# Patient Record
Sex: FEMALE | Race: WHITE | NOT HISPANIC OR LATINO | Employment: OTHER | URBAN - METROPOLITAN AREA
[De-identification: names, ages, dates, MRNs, and addresses within clinical notes are randomized per-mention and may not be internally consistent; named-entity substitution may affect disease eponyms.]

---

## 2017-08-05 ENCOUNTER — HOSPITAL ENCOUNTER (EMERGENCY)
Facility: HOSPITAL | Age: 57
Discharge: HOME | End: 2017-08-06
Attending: EMERGENCY MEDICINE | Admitting: EMERGENCY MEDICINE

## 2017-08-05 VITALS
HEART RATE: 90 BPM | HEIGHT: 64 IN | BODY MASS INDEX: 37.56 KG/M2 | WEIGHT: 220 LBS | SYSTOLIC BLOOD PRESSURE: 128 MMHG | RESPIRATION RATE: 18 BRPM | DIASTOLIC BLOOD PRESSURE: 73 MMHG | OXYGEN SATURATION: 96 % | TEMPERATURE: 97.3 F
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
## 2017-08-05 DIAGNOSIS — M25.561 RIGHT KNEE PAIN: Primary | ICD-10-CM

## 2017-08-05 LAB — ETHANOL EXG-MCNC: 0.12 MG/DL

## 2017-08-05 PROCEDURE — 82075 ASSAY OF BREATH ETHANOL: CPT | Performed by: EMERGENCY MEDICINE

## 2017-08-06 PROCEDURE — 99284 EMERGENCY DEPT VISIT MOD MDM: CPT

## 2018-06-19 LAB
ABSOL LYMPHOCYTES (HISTORICAL): 1.9 K/UL (ref 0.5–4)
BASOPHILS # BLD AUTO: 0.1 K/UL (ref 0–0.1)
BASOPHILS # BLD AUTO: 1 % (ref 0–1)
DEPRECATED RDW RBC AUTO: 15.8 %
EOSINOPHIL # BLD AUTO: 0.2 K/UL (ref 0–0.4)
EOSINOPHIL NFR BLD AUTO: 3 % (ref 0–6)
FOLATE SERPL-MCNC: >20 NG/ML
HCT VFR BLD AUTO: 31.2 % (ref 36–46)
HGB BLD-MCNC: 10 G/DL (ref 12–16)
IRON SERPL-MCNC: 36 UG/DL (ref 37–170)
LYMPHOCYTES NFR BLD AUTO: 25 % (ref 25–45)
MCH RBC QN AUTO: 25.5 PG (ref 26–34)
MCHC RBC AUTO-ENTMCNC: 32 % (ref 31–36)
MCV RBC AUTO: 80 FL (ref 80–100)
MONOCYTES # BLD AUTO: 0.7 K/UL (ref 0.2–0.9)
MONOCYTES NFR BLD AUTO: 10 % (ref 1–10)
NEUTROPHILS ABS COUNT (HISTORICAL): 4.8 K/UL (ref 1.8–7.8)
NEUTS SEG NFR BLD AUTO: 61 % (ref 45–65)
PERCENT SATURATION (HISTORICAL): 8 % (ref 11–46)
PLATELET # BLD AUTO: 342 K/MCL (ref 150–450)
RBC # BLD AUTO: 3.92 M/MCL (ref 4–5.2)
TIBC SERPL-MCNC: 473 UG/DL (ref 265–497)
VIT B12 SERPL-MCNC: 566 PG/ML (ref 239–931)
WBC # BLD AUTO: 7.7 K/MCL (ref 4.5–11)

## 2018-07-12 ENCOUNTER — TELEPHONE (OUTPATIENT)
Dept: FAMILY MEDICINE CLINIC | Facility: CLINIC | Age: 58
End: 2018-07-12

## 2018-07-12 ENCOUNTER — CLINICAL SUPPORT (OUTPATIENT)
Dept: FAMILY MEDICINE CLINIC | Facility: CLINIC | Age: 58
End: 2018-07-12
Payer: MEDICARE

## 2018-07-12 DIAGNOSIS — E53.8 VITAMIN B12 DEFICIENCY: Primary | ICD-10-CM

## 2018-07-12 RX ORDER — CYANOCOBALAMIN 1000 UG/ML
1000 INJECTION INTRAMUSCULAR; SUBCUTANEOUS
Status: SHIPPED | OUTPATIENT
Start: 2018-07-12 | End: 2019-07-07

## 2018-07-12 RX ORDER — LEVOCETIRIZINE DIHYDROCHLORIDE 5 MG/1
TABLET, FILM COATED ORAL
COMMUNITY
Start: 2017-11-16 | End: 2018-09-20 | Stop reason: SDUPTHER

## 2018-07-12 RX ADMIN — CYANOCOBALAMIN 1000 MCG: 1000 INJECTION INTRAMUSCULAR; SUBCUTANEOUS at 14:06

## 2018-09-20 ENCOUNTER — OFFICE VISIT (OUTPATIENT)
Dept: FAMILY MEDICINE CLINIC | Facility: CLINIC | Age: 58
End: 2018-09-20
Payer: MEDICARE

## 2018-09-20 VITALS
HEIGHT: 64 IN | OXYGEN SATURATION: 96 % | WEIGHT: 220 LBS | SYSTOLIC BLOOD PRESSURE: 130 MMHG | TEMPERATURE: 97.1 F | DIASTOLIC BLOOD PRESSURE: 88 MMHG | HEART RATE: 84 BPM | BODY MASS INDEX: 37.56 KG/M2 | RESPIRATION RATE: 16 BRPM

## 2018-09-20 DIAGNOSIS — Z23 NEED FOR INFLUENZA VACCINATION: ICD-10-CM

## 2018-09-20 DIAGNOSIS — D50.8 OTHER IRON DEFICIENCY ANEMIA: Primary | ICD-10-CM

## 2018-09-20 DIAGNOSIS — B35.4 TINEA CORPORIS: ICD-10-CM

## 2018-09-20 DIAGNOSIS — Z88.9 H/O SEASONAL ALLERGIES: ICD-10-CM

## 2018-09-20 DIAGNOSIS — K21.9 GERD WITHOUT ESOPHAGITIS: ICD-10-CM

## 2018-09-20 PROCEDURE — 99214 OFFICE O/P EST MOD 30 MIN: CPT

## 2018-09-20 PROCEDURE — 90686 IIV4 VACC NO PRSV 0.5 ML IM: CPT

## 2018-09-20 PROCEDURE — G0008 ADMIN INFLUENZA VIRUS VAC: HCPCS

## 2018-09-20 PROCEDURE — 96372 THER/PROPH/DIAG INJ SC/IM: CPT

## 2018-09-20 RX ORDER — LEVOCETIRIZINE DIHYDROCHLORIDE 5 MG/1
5 TABLET, FILM COATED ORAL EVERY EVENING
Qty: 30 TABLET | Refills: 3 | Status: SHIPPED | OUTPATIENT
Start: 2018-09-20 | End: 2018-12-28 | Stop reason: SDUPTHER

## 2018-09-20 RX ORDER — NYSTATIN 100000 [USP'U]/G
POWDER TOPICAL 2 TIMES DAILY
Qty: 45 G | Refills: 1 | Status: SHIPPED | OUTPATIENT
Start: 2018-09-20 | End: 2018-12-06 | Stop reason: SDUPTHER

## 2018-09-20 RX ORDER — OMEPRAZOLE 20 MG/1
20 TABLET, DELAYED RELEASE ORAL DAILY
Qty: 30 TABLET | Refills: 3 | Status: SHIPPED | OUTPATIENT
Start: 2018-09-20 | End: 2018-10-02 | Stop reason: SDUPTHER

## 2018-09-20 RX ORDER — CYANOCOBALAMIN 1000 UG/ML
1000 INJECTION INTRAMUSCULAR; SUBCUTANEOUS
Status: DISCONTINUED | OUTPATIENT
Start: 2018-09-20 | End: 2021-04-15

## 2018-09-20 RX ADMIN — CYANOCOBALAMIN 1000 MCG: 1000 INJECTION INTRAMUSCULAR; SUBCUTANEOUS at 15:27

## 2018-09-20 NOTE — PROGRESS NOTES
Assessment/Plan:    No problem-specific Assessment & Plan notes found for this encounter  Diagnoses and all orders for this visit:    Other iron deficiency anemia  Comments:  She stopped taking Fe tabs due to interaction with other meds  Ordered CBC with diff, and TIBC  Orders:  -     CBC and differential; Future  -     TIBC; Future    Tinea corporis  Comments:  Rash noted below the right breast and under the panus  Ordered nystatin powder  Orders:  -     nystatin (MYCOSTATIN) powder; Apply topically 2 (two) times a day    H/O seasonal allergies  Comments:  Refilled Xyzal  Orders:  -     levocetirizine (XYZAL) 5 MG tablet; Take 1 tablet (5 mg total) by mouth every evening    GERD without esophagitis  Comments:  Controlled, Refilled omeprazole  Orders:  -     omeprazole (PRILOSEC OTC) 20 MG tablet; Take 1 tablet (20 mg total) by mouth daily          Subjective:      Patient ID: Steve Etienne is a 61 y o  female  HPI  Nacho Rivera is a 62 yo female coming in for a check up presenting with an intertrigous rash x 1 month  She says that the rash is itchy and feels prickly  It is below her breasts and panus  She reports applying her deodorant on it which made the rash under her left breast disappear  The rash under the panus has not improved  She also reports that she has stopped taking the iron pills because when she took it with some of her psych meds, she felt very loopy and sluggish  Her speech became slurred  The following portions of the patient's history were reviewed and updated as appropriate:   She  has a past medical history of Bipolar affective disorder, depressed ([address] ); Heart murmur; Osteoarthritis; Psychiatric disorder; and Vitamin B12 deficiency  She There are no active problems to display for this patient      Current Outpatient Prescriptions   Medication Sig Dispense Refill    acetaminophen (TYLENOL) 650 mg CR tablet 1 as directed for pain      Azelastine-Fluticasone 137-50 MCG/ACT SUSP Every 12 hours      Buprenorphine (BUTRANS) 5 MCG/HR PTWK apply 1 patch by transdermal route  every 7 days      buPROPion (WELLBUTRIN XL) 300 mg 24 hr tablet every 24 hours      DULoxetine (CYMBALTA) 60 mg delayed release capsule every 12 (twelve) hours      DULoxetine HCl (CYMBALTA PO) Take by mouth      fluocinolone acetonide (DERMOTIC) 0 01 % otic oil apply by topical route thin layer to damp scalp massage well and cover  Leave on for 4 hours or overnight then wash off      GABAPENTIN PO Take by mouth      ibuprofen (MOTRIN) 200 mg tablet Reported on 7/10/2017       levocetirizine (XYZAL) 5 MG tablet Take 1 tablet (5 mg total) by mouth every evening 30 tablet 3    omeprazole (PRILOSEC OTC) 20 MG tablet Take 1 tablet (20 mg total) by mouth daily 30 tablet 3    QUEtiapine Fumarate (SEROQUEL PO) Take by mouth      nystatin (MYCOSTATIN) powder Apply topically 2 (two) times a day 45 g 1     Current Facility-Administered Medications   Medication Dose Route Frequency Provider Last Rate Last Dose    cyanocobalamin injection 1,000 mcg  1,000 mcg Intramuscular Q30 Days Ren Espinosa MD   1,000 mcg at 07/12/18 1406     Current Outpatient Prescriptions on File Prior to Visit   Medication Sig    acetaminophen (TYLENOL) 650 mg CR tablet 1 as directed for pain    Azelastine-Fluticasone 137-50 MCG/ACT SUSP Every 12 hours    Buprenorphine (BUTRANS) 5 MCG/HR PTWK apply 1 patch by transdermal route  every 7 days    buPROPion (WELLBUTRIN XL) 300 mg 24 hr tablet every 24 hours    DULoxetine (CYMBALTA) 60 mg delayed release capsule every 12 (twelve) hours    DULoxetine HCl (CYMBALTA PO) Take by mouth    fluocinolone acetonide (DERMOTIC) 0 01 % otic oil apply by topical route thin layer to damp scalp massage well and cover   Leave on for 4 hours or overnight then wash off    GABAPENTIN PO Take by mouth    ibuprofen (MOTRIN) 200 mg tablet Reported on 7/10/2017     QUEtiapine Fumarate (SEROQUEL PO) Take by mouth  [DISCONTINUED] clonazePAM (KlonoPIN) 0 5 mg tablet 3 (three) times a day    [DISCONTINUED] levocetirizine (XYZAL) 5 MG tablet take 1 tablet (5MG)  by oral route  every day in the evening    [DISCONTINUED] omeprazole (PRILOSEC OTC) 20 MG tablet Take 20 mg by mouth    [DISCONTINUED] ferrous sulfate 325 (65 Fe) mg tablet Every 12 hours     Current Facility-Administered Medications on File Prior to Visit   Medication    cyanocobalamin injection 1,000 mcg     She is allergic to erythromycin base; prochlorperazine; and rofecoxib  Lynita Ped Review of Systems   Constitutional: Negative  HENT: Negative  Eyes: Negative  Respiratory: Negative  Cardiovascular: Negative  Gastrointestinal: Negative  Genitourinary: Negative  Psychiatric/Behavioral: Negative  Objective:      /88 (BP Location: Left arm, Patient Position: Sitting, Cuff Size: Large)   Pulse 84   Temp (!) 97 1 °F (36 2 °C) (Tympanic)   Resp 16   Ht 5' 4" (1 626 m)   Wt 99 8 kg (220 lb)   SpO2 96%   BMI 37 76 kg/m²          Physical Exam   Constitutional: She appears well-developed and well-nourished  Cardiovascular: Normal rate, regular rhythm, normal heart sounds and intact distal pulses  Pulmonary/Chest: Effort normal and breath sounds normal    Neurological: She is alert  Skin: Skin is warm and dry  Rash noted  Rash is maculopapular

## 2018-10-02 ENCOUNTER — TRANSCRIBE ORDERS (OUTPATIENT)
Dept: ADMINISTRATIVE | Facility: HOSPITAL | Age: 58
End: 2018-10-02

## 2018-10-02 ENCOUNTER — LAB (OUTPATIENT)
Dept: LAB | Facility: IMAGING CENTER | Age: 58
End: 2018-10-02
Payer: MEDICARE

## 2018-10-02 DIAGNOSIS — D50.8 OTHER IRON DEFICIENCY ANEMIA: ICD-10-CM

## 2018-10-02 DIAGNOSIS — K21.9 GERD WITHOUT ESOPHAGITIS: ICD-10-CM

## 2018-10-02 LAB
BASOPHILS # BLD AUTO: 0.07 THOUSANDS/ΜL (ref 0–0.1)
BASOPHILS NFR BLD AUTO: 1 % (ref 0–1)
EOSINOPHIL # BLD AUTO: 0.06 THOUSAND/ΜL (ref 0–0.61)
EOSINOPHIL NFR BLD AUTO: 1 % (ref 0–6)
ERYTHROCYTE [DISTWIDTH] IN BLOOD BY AUTOMATED COUNT: 18.6 % (ref 11.6–15.1)
HCT VFR BLD AUTO: 37.8 % (ref 34.8–46.1)
HGB BLD-MCNC: 12 G/DL (ref 11.5–15.4)
IMM GRANULOCYTES # BLD AUTO: 0.04 THOUSAND/UL (ref 0–0.2)
IMM GRANULOCYTES NFR BLD AUTO: 1 % (ref 0–2)
LYMPHOCYTES # BLD AUTO: 2.75 THOUSANDS/ΜL (ref 0.6–4.47)
LYMPHOCYTES NFR BLD AUTO: 42 % (ref 14–44)
MCH RBC QN AUTO: 27.2 PG (ref 26.8–34.3)
MCHC RBC AUTO-ENTMCNC: 31.7 G/DL (ref 31.4–37.4)
MCV RBC AUTO: 86 FL (ref 82–98)
MONOCYTES # BLD AUTO: 0.6 THOUSAND/ΜL (ref 0.17–1.22)
MONOCYTES NFR BLD AUTO: 9 % (ref 4–12)
NEUTROPHILS # BLD AUTO: 3.01 THOUSANDS/ΜL (ref 1.85–7.62)
NEUTS SEG NFR BLD AUTO: 46 % (ref 43–75)
NRBC BLD AUTO-RTO: 0 /100 WBCS
PLATELET # BLD AUTO: 345 THOUSANDS/UL (ref 149–390)
PMV BLD AUTO: 9.9 FL (ref 8.9–12.7)
RBC # BLD AUTO: 4.41 MILLION/UL (ref 3.81–5.12)
TIBC SERPL-MCNC: 464 UG/DL (ref 250–450)
WBC # BLD AUTO: 6.53 THOUSAND/UL (ref 4.31–10.16)

## 2018-10-02 PROCEDURE — 85025 COMPLETE CBC W/AUTO DIFF WBC: CPT

## 2018-10-02 PROCEDURE — 83550 IRON BINDING TEST: CPT

## 2018-10-02 PROCEDURE — 36415 COLL VENOUS BLD VENIPUNCTURE: CPT

## 2018-10-02 RX ORDER — OMEPRAZOLE 20 MG/1
20 TABLET, DELAYED RELEASE ORAL 2 TIMES DAILY
Qty: 60 TABLET | Refills: 1 | Status: SHIPPED | OUTPATIENT
Start: 2018-10-02 | End: 2018-12-06 | Stop reason: SDUPTHER

## 2018-10-02 NOTE — TELEPHONE ENCOUNTER
PATIENT WALKED IN, SHE JUST PICKED UP HER SCRIPT FOR (PRILOSEC OTC 20 MG) AND IT SAYS ONCE DAILY, PT WOULD LIKE TO TAKE IT TWICE A DAY LIKE ON HER LAST SCRIPT SHE HAD BEFORE THIS ONE  [facility]

## 2018-10-03 ENCOUNTER — TELEPHONE (OUTPATIENT)
Dept: FAMILY MEDICINE CLINIC | Facility: CLINIC | Age: 58
End: 2018-10-03

## 2018-10-03 NOTE — TELEPHONE ENCOUNTER
----- Message from Rosie Hinds MD sent at 10/3/2018  4:57 PM EDT -----  Blood work is fine, Hb is much better

## 2018-11-05 ENCOUNTER — OFFICE VISIT (OUTPATIENT)
Dept: FAMILY MEDICINE CLINIC | Facility: CLINIC | Age: 58
End: 2018-11-05
Payer: MEDICARE

## 2018-11-05 VITALS
BODY MASS INDEX: 40.12 KG/M2 | HEART RATE: 98 BPM | WEIGHT: 235 LBS | RESPIRATION RATE: 16 BRPM | DIASTOLIC BLOOD PRESSURE: 80 MMHG | SYSTOLIC BLOOD PRESSURE: 122 MMHG | HEIGHT: 64 IN | TEMPERATURE: 97 F | OXYGEN SATURATION: 95 %

## 2018-11-05 DIAGNOSIS — M25.561 CHRONIC PAIN OF BOTH KNEES: ICD-10-CM

## 2018-11-05 DIAGNOSIS — G89.29 CHRONIC PAIN OF BOTH KNEES: ICD-10-CM

## 2018-11-05 DIAGNOSIS — E53.9 VITAMIN B DEFICIENCY: ICD-10-CM

## 2018-11-05 DIAGNOSIS — R68.89 FORGETFULNESS: ICD-10-CM

## 2018-11-05 DIAGNOSIS — M25.562 CHRONIC PAIN OF BOTH KNEES: ICD-10-CM
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
## 2018-11-05 PROCEDURE — 99214 OFFICE O/P EST MOD 30 MIN: CPT | Performed by: FAMILY MEDICINE

## 2018-11-06 RX ORDER — CYANOCOBALAMIN 1000 UG/ML
1000 INJECTION INTRAMUSCULAR; SUBCUTANEOUS
Status: DISCONTINUED | OUTPATIENT
Start: 2018-11-06 | End: 2021-04-15

## 2018-11-06 RX ADMIN — CYANOCOBALAMIN 1000 MCG: 1000 INJECTION INTRAMUSCULAR; SUBCUTANEOUS at 08:53

## 2018-11-06 NOTE — PROGRESS NOTES
Assessment/Plan:    No problem-specific Assessment & Plan notes found for this encounter  Diagnoses and all orders for this visit:    Fall, subsequent encounter  Comments:  Advise to see the ortho for both chronic arthrtis  Orders:  -     Ambulatory referral to Neurology; Future    Chronic pain of both knees  Comments:  see ortho  Vitamin B deficiency  Comments:  B12 inj  Orders:  -     cyanocobalamin injection 1,000 mcg; Inject 1 mL (1,000 mcg total) into a muscle every 30 (thirty) days     Forgetfulness  Comments:  Patient wants to see the Neurologist           Subjective:      Patient ID: Pascale Thurman is a 61 y o  female  HPI     Patient is here for a follow up and wants to discussed her recent fall, she thinks that lately she is feeling very fatigue, tired, forgetting and falling  She has almost 2 falls in the last week  Patient has a previous history of bariatric surgery and since than gain weight , both knee has chronic arthritis, she is not following with the ortho  I think that patient should follow with the ortho but she thinks that she has sign of MS, Her exam is fine but she eager to see the Neurologist for frequent fall and forgetfulness  The following portions of the patient's history were reviewed and updated as appropriate:   She  has a past medical history of Bipolar affective disorder, depressed ([address] ); Heart murmur; Osteoarthritis; Psychiatric disorder; and Vitamin B12 deficiency    Current Outpatient Prescriptions   Medication Sig Dispense Refill    acetaminophen (TYLENOL) 650 mg CR tablet 1 as directed for pain      Azelastine-Fluticasone 137-50 MCG/ACT SUSP Every 12 hours      Buprenorphine (BUTRANS) 5 MCG/HR PTWK apply 1 patch by transdermal route  every 7 days      buPROPion (WELLBUTRIN XL) 300 mg 24 hr tablet every 24 hours      DULoxetine (CYMBALTA) 60 mg delayed release capsule every 12 (twelve) hours      DULoxetine HCl (CYMBALTA PO) Take by mouth      fluocinolone acetonide (DERMOTIC) 0 01 % otic oil apply by topical route thin layer to damp scalp massage well and cover  Leave on for 4 hours or overnight then wash off      GABAPENTIN PO Take by mouth      ibuprofen (MOTRIN) 200 mg tablet Reported on 7/10/2017       levocetirizine (XYZAL) 5 MG tablet Take 1 tablet (5 mg total) by mouth every evening 30 tablet 3    nystatin (MYCOSTATIN) powder Apply topically 2 (two) times a day 45 g 1    omeprazole (PRILOSEC OTC) 20 MG tablet Take 1 tablet (20 mg total) by mouth 2 (two) times a day 60 tablet 1    QUEtiapine Fumarate (SEROQUEL PO) Take by mouth       Current Facility-Administered Medications   Medication Dose Route Frequency Provider Last Rate Last Dose    cyanocobalamin injection 1,000 mcg  1,000 mcg Intramuscular Q30 Days Jose Martinez MD   1,000 mcg at 07/12/18 1406    cyanocobalamin injection 1,000 mcg  1,000 mcg Intramuscular Q30 Days Yusef Cohen MD   1,000 mcg at 09/20/18 1527     She is allergic to erythromycin base; prochlorperazine; and rofecoxib      Review of Systems      Objective:      /80 (BP Location: Left arm, Patient Position: Sitting, Cuff Size: Large)   Pulse 98   Temp (!) 97 °F (36 1 °C) (Tympanic)   Resp 16   Ht 5' 4" (1 626 m)   Wt 107 kg (235 lb)   SpO2 95%   BMI 40 34 kg/m²          Physical Exam

## 2018-12-06 ENCOUNTER — OFFICE VISIT (OUTPATIENT)
Dept: FAMILY MEDICINE CLINIC | Facility: CLINIC | Age: 58
End: 2018-12-06
Payer: MEDICARE

## 2018-12-06 VITALS
SYSTOLIC BLOOD PRESSURE: 138 MMHG | BODY MASS INDEX: 39.09 KG/M2 | OXYGEN SATURATION: 95 % | WEIGHT: 229 LBS | HEIGHT: 64 IN | RESPIRATION RATE: 16 BRPM | DIASTOLIC BLOOD PRESSURE: 88 MMHG | HEART RATE: 90 BPM

## 2018-12-06 DIAGNOSIS — B35.4 TINEA CORPORIS: ICD-10-CM

## 2018-12-06 DIAGNOSIS — E53.8 VITAMIN B12 DEFICIENCY: ICD-10-CM

## 2018-12-06 DIAGNOSIS — T78.40XS ALLERGIC STATE, SEQUELA: Primary | ICD-10-CM

## 2018-12-06 DIAGNOSIS — K21.9 GERD WITHOUT ESOPHAGITIS: ICD-10-CM

## 2018-12-06 PROCEDURE — 99214 OFFICE O/P EST MOD 30 MIN: CPT | Performed by: FAMILY MEDICINE

## 2018-12-06 RX ORDER — CYANOCOBALAMIN 1000 UG/ML
1000 INJECTION INTRAMUSCULAR; SUBCUTANEOUS
Status: DISCONTINUED | OUTPATIENT
Start: 2018-12-06 | End: 2021-04-15

## 2018-12-06 RX ORDER — CETIRIZINE HYDROCHLORIDE 10 MG/1
10 TABLET, CHEWABLE ORAL DAILY
Qty: 90 TABLET | Refills: 1 | Status: SHIPPED | OUTPATIENT
Start: 2018-12-06 | End: 2018-12-28 | Stop reason: ALTCHOICE

## 2018-12-06 RX ORDER — OMEPRAZOLE 20 MG/1
20 TABLET, DELAYED RELEASE ORAL 2 TIMES DAILY
Qty: 60 TABLET | Refills: 0 | Status: SHIPPED | OUTPATIENT
Start: 2018-12-06 | End: 2018-12-28 | Stop reason: SDUPTHER

## 2018-12-06 RX ORDER — NYSTATIN 100000 [USP'U]/G
POWDER TOPICAL 2 TIMES DAILY
Qty: 45 G | Refills: 2 | Status: SHIPPED | OUTPATIENT
Start: 2018-12-06 | End: 2019-08-20

## 2018-12-06 RX ADMIN — CYANOCOBALAMIN 1000 MCG: 1000 INJECTION INTRAMUSCULAR; SUBCUTANEOUS at 16:31

## 2018-12-06 NOTE — PROGRESS NOTES
Assessment/Plan:    No problem-specific Assessment & Plan notes found for this encounter  Diagnoses and all orders for this visit:    Allergic state, sequela  Comments:  refill meds  Orders:  -     cetirizine (ZyrTEC) 10 MG chewable tablet; Chew 1 tablet (10 mg total) daily    GERD without esophagitis  Comments:  Controlled, Refilled omeprazole  Orders:  -     omeprazole (PRILOSEC OTC) 20 MG tablet; Take 1 tablet (20 mg total) by mouth 2 (two) times a day    Tinea corporis  Comments:  Rash noted below the right breast and under the panus  Ordered nystatin powder  Orders:  -     nystatin (MYCOSTATIN) powder; Apply topically 2 (two) times a day    Vitamin B12 deficiency  Comments:  given  Orders:  -     cyanocobalamin injection 1,000 mcg; Inject 1 mL (1,000 mcg total) into a muscle every 30 (thirty) days           Subjective:      Patient ID: Alfred Fournier is a 61 y o  female  HPI     Patient is here for the follow up , , complaining of repeated fall and thinking she need a neurologist appt , as she might have MS, seems that she has falling issues because of the knee arthritis  The following portions of the patient's history were reviewed and updated as appropriate:   She  has a past medical history of Bipolar affective disorder, depressed ([address] ); Heart murmur; Osteoarthritis; Psychiatric disorder; and Vitamin B12 deficiency    Current Outpatient Prescriptions   Medication Sig Dispense Refill    acetaminophen (TYLENOL) 650 mg CR tablet 1 as directed for pain      Azelastine-Fluticasone 137-50 MCG/ACT SUSP Every 12 hours      Buprenorphine (BUTRANS) 5 MCG/HR PTWK apply 1 patch by transdermal route  every 7 days      buPROPion (WELLBUTRIN XL) 300 mg 24 hr tablet every 24 hours      DULoxetine (CYMBALTA) 60 mg delayed release capsule every 12 (twelve) hours      DULoxetine HCl (CYMBALTA PO) Take by mouth      fluocinolone acetonide (DERMOTIC) 0 01 % otic oil apply by topical route thin layer to damp scalp massage well and cover  Leave on for 4 hours or overnight then wash off      GABAPENTIN PO Take by mouth      ibuprofen (MOTRIN) 200 mg tablet Reported on 7/10/2017       levocetirizine (XYZAL) 5 MG tablet Take 1 tablet (5 mg total) by mouth every evening 30 tablet 3    nystatin (MYCOSTATIN) powder Apply topically 2 (two) times a day 45 g 2    omeprazole (PRILOSEC OTC) 20 MG tablet Take 1 tablet (20 mg total) by mouth 2 (two) times a day 60 tablet 0    QUEtiapine Fumarate (SEROQUEL PO) Take by mouth      cetirizine (ZyrTEC) 10 MG chewable tablet Chew 1 tablet (10 mg total) daily 90 tablet 1     Current Facility-Administered Medications   Medication Dose Route Frequency Provider Last Rate Last Dose    cyanocobalamin injection 1,000 mcg  1,000 mcg Intramuscular Q30 Days Clinton Wallis MD   1,000 mcg at 07/12/18 1406    cyanocobalamin injection 1,000 mcg  1,000 mcg Intramuscular Q30 Days Ivonne Daniel MD   1,000 mcg at 09/20/18 1527    cyanocobalamin injection 1,000 mcg  1,000 mcg Intramuscular Q30 Days Ivonne Daniel MD   1,000 mcg at 11/06/18 0853    cyanocobalamin injection 1,000 mcg  1,000 mcg Intramuscular Q30 Days Ivonne Daniel MD   1,000 mcg at 12/06/18 1631     She is allergic to erythromycin base; prochlorperazine; and rofecoxib  Review of Systems   Constitutional: Negative  HENT: Negative  Eyes: Negative  Respiratory: Negative  Cardiovascular: Negative  Gastrointestinal: Negative  Genitourinary: Negative  Psychiatric/Behavioral: Negative  Objective:      /88 (BP Location: Left arm, Patient Position: Sitting, Cuff Size: Large)   Pulse 90   Resp 16   Ht 5' 4" (1 626 m)   Wt 104 kg (229 lb)   SpO2 95%   BMI 39 31 kg/m²          Physical Exam   Constitutional: She is oriented to person, place, and time  She appears well-developed  HENT:   Head: Normocephalic  Mouth/Throat: Oropharynx is clear and moist    Neck: Normal range of motion  Cardiovascular: Normal rate and regular rhythm  Pulmonary/Chest: Effort normal and breath sounds normal    Abdominal: Soft  Bowel sounds are normal    Neurological: She is alert and oriented to person, place, and time  Skin: Skin is warm  Psychiatric: She has a normal mood and affect

## 2018-12-28 ENCOUNTER — OFFICE VISIT (OUTPATIENT)
Dept: FAMILY MEDICINE CLINIC | Facility: CLINIC | Age: 58
End: 2018-12-28
Payer: MEDICARE

## 2018-12-28 VITALS
OXYGEN SATURATION: 98 % | HEIGHT: 64 IN | DIASTOLIC BLOOD PRESSURE: 84 MMHG | TEMPERATURE: 97.1 F | SYSTOLIC BLOOD PRESSURE: 120 MMHG | HEART RATE: 96 BPM | WEIGHT: 228 LBS | RESPIRATION RATE: 16 BRPM | BODY MASS INDEX: 38.93 KG/M2

## 2018-12-28 DIAGNOSIS — Z01.818 PRE-OP EXAMINATION: ICD-10-CM

## 2018-12-28 DIAGNOSIS — M48.02 CERVICAL STENOSIS OF SPINAL CANAL: Primary | ICD-10-CM

## 2018-12-28 DIAGNOSIS — Z88.9 H/O SEASONAL ALLERGIES: ICD-10-CM

## 2018-12-28 DIAGNOSIS — L30.9 DERMATITIS: ICD-10-CM

## 2018-12-28 DIAGNOSIS — K21.9 GERD WITHOUT ESOPHAGITIS: ICD-10-CM

## 2018-12-28 PROBLEM — I60.9 SAH (SUBARACHNOID HEMORRHAGE) (HCC): Status: ACTIVE | Noted: 2017-06-25

## 2018-12-28 PROBLEM — E66.9 OBESITY: Status: ACTIVE | Noted: 2018-12-28

## 2018-12-28 PROCEDURE — 99214 OFFICE O/P EST MOD 30 MIN: CPT | Performed by: FAMILY MEDICINE

## 2018-12-28 RX ORDER — LEVOCETIRIZINE DIHYDROCHLORIDE 5 MG/1
5 TABLET, FILM COATED ORAL EVERY EVENING
Qty: 30 TABLET | Refills: 3 | Status: SHIPPED | OUTPATIENT
Start: 2018-12-28 | End: 2019-05-15 | Stop reason: SDUPTHER

## 2018-12-28 RX ORDER — OMEPRAZOLE 20 MG/1
20 TABLET, DELAYED RELEASE ORAL 2 TIMES DAILY
Qty: 60 TABLET | Refills: 3 | Status: SHIPPED | OUTPATIENT
Start: 2018-12-28 | End: 2019-08-28 | Stop reason: SDUPTHER

## 2018-12-28 RX ORDER — CLOTRIMAZOLE AND BETAMETHASONE DIPROPIONATE 10; .64 MG/G; MG/G
CREAM TOPICAL 2 TIMES DAILY
Qty: 30 G | Refills: 2 | Status: SHIPPED | OUTPATIENT
Start: 2018-12-28 | End: 2019-08-20

## 2018-12-28 NOTE — PROGRESS NOTES
Assessment/Plan:     Diagnoses and all orders for this visit:    Cervical stenosis of spinal canal  Comments:  She is getting her surgery done on January 9, 2019  Pre-op examination  Comments:  She is an acceptable risk for the proposed procedure  Form completed and signed  Dermatitis  Comments:  Was given a prescription for Lotrisone cream   Orders:  -     clotrimazole-betamethasone (LOTRISONE) 1-0 05 % cream; Apply topically 2 (two) times a day    GERD without esophagitis  Comments:  Controlled, Refilled omeprazole  Orders:  -     omeprazole (PRILOSEC OTC) 20 MG tablet; Take 1 tablet (20 mg total) by mouth 2 (two) times a day    H/O seasonal allergies  Comments:  Refilled Xyzal  Orders:  -     levocetirizine (XYZAL) 5 MG tablet; Take 1 tablet (5 mg total) by mouth every evening          There are no Patient Instructions on file for this visit  Return if symptoms worsen or fail to improve  Subjective:      Patient ID: Naseem Hemphill is a 60 y o  female  Chief Complaint   Patient presents with    Pre-op Exam     c4-7 screws and rods [facility] Dr Ruth Ann Menon       She is here today for preop clearance for neck surgery  She had her preop testing done and they all came back within normal limits  She denies any shortness of breath or chest pain  The following portions of the patient's history were reviewed and updated as appropriate: allergies, current medications, past family history, past medical history, past social history, past surgical history and problem list     Review of Systems   Constitutional: Negative for chills and fever  HENT: Negative for trouble swallowing  Eyes: Negative for visual disturbance  Respiratory: Negative for cough and shortness of breath  Cardiovascular: Negative for chest pain, palpitations and leg swelling  Gastrointestinal: Negative for abdominal pain, constipation and diarrhea     Endocrine: Negative for cold intolerance and heat intolerance  Genitourinary: Negative for difficulty urinating and dysuria  Musculoskeletal: Negative for gait problem  Skin: Negative for rash  Neurological: Negative for dizziness, tremors, seizures and headaches  Hematological: Negative for adenopathy  Psychiatric/Behavioral: Negative for behavioral problems           Current Outpatient Prescriptions   Medication Sig Dispense Refill    acetaminophen (TYLENOL) 650 mg CR tablet 1 as directed for pain      Buprenorphine (BUTRANS) 5 MCG/HR PTWK apply 1 patch by transdermal route  every 7 days      BUPROPION HCL ER, XL, PO every 24 hours        DULoxetine (CYMBALTA) 60 mg delayed release capsule every 12 (twelve) hours      GABAPENTIN PO Take by mouth      ibuprofen (MOTRIN) 200 mg tablet Reported on 7/10/2017       levocetirizine (XYZAL) 5 MG tablet Take 1 tablet (5 mg total) by mouth every evening 30 tablet 3    nystatin (MYCOSTATIN) powder Apply topically 2 (two) times a day 45 g 2    omeprazole (PRILOSEC OTC) 20 MG tablet Take 1 tablet (20 mg total) by mouth 2 (two) times a day 60 tablet 3    QUEtiapine Fumarate (SEROQUEL PO) Take by mouth      Azelastine-Fluticasone 137-50 MCG/ACT SUSP Every 12 hours      clotrimazole-betamethasone (LOTRISONE) 1-0 05 % cream Apply topically 2 (two) times a day 30 g 2     Current Facility-Administered Medications   Medication Dose Route Frequency Provider Last Rate Last Dose    cyanocobalamin injection 1,000 mcg  1,000 mcg Intramuscular Q30 Days Clinton Wallis MD   1,000 mcg at 07/12/18 1406    cyanocobalamin injection 1,000 mcg  1,000 mcg Intramuscular Q30 Days Stephen Leon MD   1,000 mcg at 09/20/18 1527    cyanocobalamin injection 1,000 mcg  1,000 mcg Intramuscular Q30 Days Stephen Leon MD   1,000 mcg at 11/06/18 0853    cyanocobalamin injection 1,000 mcg  1,000 mcg Intramuscular Q30 Days Stephen Leon MD   1,000 mcg at 12/06/18 1631       Objective:    /84 (BP Location: Left arm, Patient Position: Sitting, Cuff Size: Large)   Pulse 96   Temp (!) 97 1 °F (36 2 °C) (Tympanic)   Resp 16   Ht 5' 4" (1 626 m)   Wt 103 kg (228 lb)   SpO2 98%   BMI 39 14 kg/m²        Physical Exam   Constitutional: She is oriented to person, place, and time  She appears well-developed and well-nourished  HENT:   Head: Normocephalic and atraumatic  Eyes: Pupils are equal, round, and reactive to light  EOM are normal    Neck: Normal range of motion  Neck supple  Cardiovascular: Normal rate, regular rhythm and normal heart sounds  Pulmonary/Chest: Effort normal and breath sounds normal    Abdominal: Soft  Bowel sounds are normal    Musculoskeletal: Normal range of motion  She exhibits no edema  Lymphadenopathy:     She has no cervical adenopathy  Neurological: She is alert and oriented to person, place, and time  No cranial nerve deficit  Skin: Skin is warm  Psychiatric: She has a normal mood and affect  Nursing note and vitals reviewed               Layla Gardiner MD

## 2019-01-07 ENCOUNTER — CLINICAL SUPPORT (OUTPATIENT)
Dept: FAMILY MEDICINE CLINIC | Facility: CLINIC | Age: 59
End: 2019-01-07
Payer: MEDICARE

## 2019-01-07 DIAGNOSIS — E53.8 VITAMIN B12 DEFICIENCY: Primary | ICD-10-CM

## 2019-01-07 PROCEDURE — 99211 OFF/OP EST MAY X REQ PHY/QHP: CPT

## 2019-01-07 RX ORDER — CYANOCOBALAMIN 1000 UG/ML
1000 INJECTION INTRAMUSCULAR; SUBCUTANEOUS
Status: DISCONTINUED | OUTPATIENT
Start: 2019-01-07 | End: 2021-04-15

## 2019-01-07 RX ADMIN — CYANOCOBALAMIN 1000 MCG: 1000 INJECTION INTRAMUSCULAR; SUBCUTANEOUS at 14:43

## 2019-02-04 ENCOUNTER — OFFICE VISIT (OUTPATIENT)
Dept: FAMILY MEDICINE CLINIC | Facility: CLINIC | Age: 59
End: 2019-02-04
Payer: MEDICARE

## 2019-02-04 DIAGNOSIS — E53.8 VITAMIN B12 DEFICIENCY: Primary | ICD-10-CM

## 2019-02-04 PROCEDURE — 99211 OFF/OP EST MAY X REQ PHY/QHP: CPT

## 2019-02-04 RX ORDER — CYANOCOBALAMIN 1000 UG/ML
1000 INJECTION INTRAMUSCULAR; SUBCUTANEOUS
Status: DISCONTINUED | OUTPATIENT
Start: 2019-02-04 | End: 2021-04-15

## 2019-02-04 RX ADMIN — CYANOCOBALAMIN 1000 MCG: 1000 INJECTION INTRAMUSCULAR; SUBCUTANEOUS at 13:24

## 2019-03-08 ENCOUNTER — CLINICAL SUPPORT (OUTPATIENT)
Dept: FAMILY MEDICINE CLINIC | Facility: CLINIC | Age: 59
End: 2019-03-08
Payer: MEDICARE

## 2019-03-08 DIAGNOSIS — E53.8 VITAMIN B 12 DEFICIENCY: Primary | ICD-10-CM

## 2019-03-08 RX ORDER — CYANOCOBALAMIN 1000 UG/ML
1000 INJECTION INTRAMUSCULAR; SUBCUTANEOUS
Status: DISCONTINUED | OUTPATIENT
Start: 2019-03-08 | End: 2021-04-15

## 2019-03-08 RX ADMIN — CYANOCOBALAMIN 1000 MCG: 1000 INJECTION INTRAMUSCULAR; SUBCUTANEOUS at 15:01

## 2019-03-11 RX ADMIN — CYANOCOBALAMIN 1000 MCG: 1000 INJECTION INTRAMUSCULAR; SUBCUTANEOUS at 16:44

## 2019-04-08 ENCOUNTER — CLINICAL SUPPORT (OUTPATIENT)
Dept: FAMILY MEDICINE CLINIC | Facility: CLINIC | Age: 59
End: 2019-04-08
Payer: MEDICARE

## 2019-04-08 DIAGNOSIS — E53.9 VITAMIN B-COMPLEX DEFICIENCY: Primary | ICD-10-CM

## 2019-04-08 RX ORDER — CYANOCOBALAMIN 1000 UG/ML
1000 INJECTION INTRAMUSCULAR; SUBCUTANEOUS
Status: DISCONTINUED | OUTPATIENT
Start: 2019-04-08 | End: 2021-04-15

## 2019-04-08 RX ADMIN — CYANOCOBALAMIN 1000 MCG: 1000 INJECTION INTRAMUSCULAR; SUBCUTANEOUS at 13:17

## 2019-05-06 ENCOUNTER — TELEPHONE (OUTPATIENT)
Dept: FAMILY MEDICINE CLINIC | Facility: CLINIC | Age: 59
End: 2019-05-06

## 2019-05-06 ENCOUNTER — CONSULT (OUTPATIENT)
Dept: FAMILY MEDICINE CLINIC | Facility: CLINIC | Age: 59
End: 2019-05-06
Payer: MEDICARE

## 2019-05-06 VITALS
HEIGHT: 64 IN | BODY MASS INDEX: 40.77 KG/M2 | DIASTOLIC BLOOD PRESSURE: 80 MMHG | HEART RATE: 88 BPM | OXYGEN SATURATION: 95 % | WEIGHT: 238.8 LBS | TEMPERATURE: 97.8 F | SYSTOLIC BLOOD PRESSURE: 120 MMHG | RESPIRATION RATE: 16 BRPM

## 2019-05-06 DIAGNOSIS — D50.8 IRON DEFICIENCY ANEMIA SECONDARY TO INADEQUATE DIETARY IRON INTAKE: ICD-10-CM

## 2019-05-06 DIAGNOSIS — Z11.59 ENCOUNTER FOR HEPATITIS C VIRUS SCREENING TEST FOR HIGH RISK PATIENT: ICD-10-CM

## 2019-05-06 DIAGNOSIS — G89.29 CHRONIC PAIN OF LEFT KNEE: Primary | ICD-10-CM

## 2019-05-06 DIAGNOSIS — Z91.89 ENCOUNTER FOR HEPATITIS C VIRUS SCREENING TEST FOR HIGH RISK PATIENT: ICD-10-CM

## 2019-05-06 DIAGNOSIS — Z12.31 ENCOUNTER FOR SCREENING MAMMOGRAM FOR MALIGNANT NEOPLASM OF BREAST: ICD-10-CM

## 2019-05-06 DIAGNOSIS — L30.9 DERMATITIS: ICD-10-CM

## 2019-05-06 DIAGNOSIS — Z01.818 PRE-OP EXAMINATION: ICD-10-CM

## 2019-05-06 DIAGNOSIS — Z12.11 ENCOUNTER FOR SCREENING FOR MALIGNANT NEOPLASM OF COLON: ICD-10-CM

## 2019-05-06 DIAGNOSIS — M25.562 CHRONIC PAIN OF LEFT KNEE: Primary | ICD-10-CM

## 2019-05-06 DIAGNOSIS — E66.01 MORBID OBESITY WITH BMI OF 40.0-44.9, ADULT (HCC): ICD-10-CM

## 2019-05-06 PROCEDURE — 99214 OFFICE O/P EST MOD 30 MIN: CPT | Performed by: FAMILY MEDICINE

## 2019-05-06 RX ORDER — TIZANIDINE 4 MG/1
4 TABLET ORAL 3 TIMES DAILY
Refills: 0 | COMMUNITY
Start: 2019-04-19

## 2019-05-06 RX ORDER — CLOTRIMAZOLE AND BETAMETHASONE DIPROPIONATE 10; .64 MG/G; MG/G
CREAM TOPICAL 2 TIMES DAILY
Qty: 30 G | Refills: 0 | Status: SHIPPED | OUTPATIENT
Start: 2019-05-06 | End: 2020-03-17

## 2019-05-15 DIAGNOSIS — Z88.9 H/O SEASONAL ALLERGIES: ICD-10-CM

## 2019-05-15 RX ORDER — LEVOCETIRIZINE DIHYDROCHLORIDE 5 MG/1
5 TABLET, FILM COATED ORAL EVERY EVENING
Qty: 30 TABLET | Refills: 3 | Status: SHIPPED | OUTPATIENT
Start: 2019-05-15 | End: 2019-09-09 | Stop reason: SDUPTHER

## 2019-05-17 ENCOUNTER — CONSULT (OUTPATIENT)
Dept: HEMATOLOGY ONCOLOGY | Facility: CLINIC | Age: 59
End: 2019-05-17
Payer: MEDICARE

## 2019-05-17 VITALS
WEIGHT: 239 LBS | TEMPERATURE: 98.6 F | RESPIRATION RATE: 16 BRPM | BODY MASS INDEX: 40.8 KG/M2 | SYSTOLIC BLOOD PRESSURE: 128 MMHG | HEIGHT: 64 IN | DIASTOLIC BLOOD PRESSURE: 82 MMHG | HEART RATE: 97 BPM

## 2019-05-17 DIAGNOSIS — D50.8 IRON DEFICIENCY ANEMIA SECONDARY TO INADEQUATE DIETARY IRON INTAKE: ICD-10-CM

## 2019-05-17 DIAGNOSIS — Z01.818 PRE-OP EXAMINATION: ICD-10-CM

## 2019-05-17 DIAGNOSIS — K91.2 POSTGASTRECTOMY MALABSORPTION: Primary | ICD-10-CM

## 2019-05-17 DIAGNOSIS — Z90.3 POSTGASTRECTOMY MALABSORPTION: Primary | ICD-10-CM

## 2019-05-17 PROCEDURE — 99204 OFFICE O/P NEW MOD 45 MIN: CPT | Performed by: INTERNAL MEDICINE

## 2019-05-17 RX ORDER — SODIUM CHLORIDE 9 MG/ML
20 INJECTION, SOLUTION INTRAVENOUS ONCE
Status: CANCELLED | OUTPATIENT
Start: 2019-05-28

## 2019-05-17 RX ORDER — BUPRENORPHINE 10 UG/H
PATCH TRANSDERMAL
Refills: 0 | COMMUNITY
Start: 2019-05-11 | End: 2020-01-23

## 2019-05-22 DIAGNOSIS — K91.2 POSTGASTRECTOMY MALABSORPTION: ICD-10-CM

## 2019-05-22 DIAGNOSIS — D50.8 IRON DEFICIENCY ANEMIA SECONDARY TO INADEQUATE DIETARY IRON INTAKE: ICD-10-CM

## 2019-05-22 DIAGNOSIS — Z90.3 POSTGASTRECTOMY MALABSORPTION: ICD-10-CM

## 2019-05-23 ENCOUNTER — HOSPITAL ENCOUNTER (OUTPATIENT)
Dept: INFUSION CENTER | Facility: CLINIC | Age: 59
Discharge: HOME | End: 2019-05-23
Payer: MEDICARE

## 2019-05-23 VITALS
SYSTOLIC BLOOD PRESSURE: 116 MMHG | TEMPERATURE: 98.3 F | DIASTOLIC BLOOD PRESSURE: 83 MMHG | HEART RATE: 97 BPM | RESPIRATION RATE: 18 BRPM

## 2019-05-23 DIAGNOSIS — D50.8 IRON DEFICIENCY ANEMIA SECONDARY TO INADEQUATE DIETARY IRON INTAKE: ICD-10-CM

## 2019-05-23 DIAGNOSIS — K91.2 POSTGASTRECTOMY MALABSORPTION: Primary | ICD-10-CM

## 2019-05-23 DIAGNOSIS — Z90.3 POSTGASTRECTOMY MALABSORPTION: Primary | ICD-10-CM

## 2019-05-23 PROCEDURE — 96365 THER/PROPH/DIAG IV INF INIT: CPT

## 2019-05-23 RX ORDER — SODIUM CHLORIDE 9 MG/ML
20 INJECTION, SOLUTION INTRAVENOUS ONCE
Status: COMPLETED | OUTPATIENT
Start: 2019-05-23 | End: 2019-05-23

## 2019-05-23 RX ORDER — SODIUM CHLORIDE 9 MG/ML
20 INJECTION, SOLUTION INTRAVENOUS ONCE
Status: CANCELLED | OUTPATIENT
Start: 2019-05-23

## 2019-05-23 RX ADMIN — IRON SUCROSE 200 MG: 20 INJECTION, SOLUTION INTRAVENOUS at 14:00

## 2019-05-23 RX ADMIN — SODIUM CHLORIDE 20 ML/HR: 0.9 INJECTION, SOLUTION INTRAVENOUS at 13:46

## 2019-05-23 NOTE — PLAN OF CARE
Problem: Potential for Falls  Goal: Patient will remain free of falls  Description  INTERVENTIONS:  - Assess patient frequently for physical needs  -  Identify cognitive and physical deficits and behaviors that affect risk of falls    -  [place] fall precautions as indicated by assessment   - Educate patient/family on patient safety including physical limitations  - Instruct patient to call for assistance with activity based on assessment  - Modify environment to reduce risk of injury  - Consider OT/PT consult to assist with strengthening/mobility  Outcome: Progressing

## 2019-05-28 ENCOUNTER — HOSPITAL ENCOUNTER (OUTPATIENT)
Dept: INFUSION CENTER | Facility: CLINIC | Age: 59
Discharge: HOME | End: 2019-05-28

## 2019-05-28 NOTE — PROGRESS NOTES
Pt called to change her appt for today  Requested tomorrow and cancel Thursday  Will resume next week then

## 2019-05-29 ENCOUNTER — HOSPITAL ENCOUNTER (OUTPATIENT)
Dept: INFUSION CENTER | Facility: CLINIC | Age: 59
Discharge: HOME | End: 2019-05-29
Payer: MEDICARE

## 2019-05-29 VITALS
HEART RATE: 90 BPM | TEMPERATURE: 98.6 F | RESPIRATION RATE: 16 BRPM | SYSTOLIC BLOOD PRESSURE: 111 MMHG | DIASTOLIC BLOOD PRESSURE: 77 MMHG

## 2019-05-29 DIAGNOSIS — D50.8 IRON DEFICIENCY ANEMIA SECONDARY TO INADEQUATE DIETARY IRON INTAKE: ICD-10-CM

## 2019-05-29 DIAGNOSIS — K91.2 POSTGASTRECTOMY MALABSORPTION: Primary | ICD-10-CM

## 2019-05-29 DIAGNOSIS — Z90.3 POSTGASTRECTOMY MALABSORPTION: Primary | ICD-10-CM

## 2019-05-29 LAB
BASOPHILS # BLD AUTO: 0.04 THOUSANDS/ΜL (ref 0–0.1)
BASOPHILS NFR BLD AUTO: 1 % (ref 0–1)
EOSINOPHIL # BLD AUTO: 0.06 THOUSAND/ΜL (ref 0–0.61)
EOSINOPHIL NFR BLD AUTO: 1 % (ref 0–6)
ERYTHROCYTE [DISTWIDTH] IN BLOOD BY AUTOMATED COUNT: 16.9 % (ref 11.6–15.1)
FERRITIN SERPL-MCNC: 51 NG/ML (ref 8–388)
HCT VFR BLD AUTO: 29.8 % (ref 34.8–46.1)
HGB BLD-MCNC: 9.9 G/DL (ref 11.5–15.4)
LYMPHOCYTES # BLD AUTO: 2.17 THOUSANDS/ΜL (ref 0.6–4.47)
LYMPHOCYTES NFR BLD AUTO: 35 % (ref 14–44)
MCH RBC QN AUTO: 24.8 PG (ref 26.8–34.3)
MCHC RBC AUTO-ENTMCNC: 33.2 G/DL (ref 31.4–37.4)
MCV RBC AUTO: 75 FL (ref 82–98)
MONOCYTES # BLD AUTO: 0.67 THOUSAND/ΜL (ref 0.17–1.22)
MONOCYTES NFR BLD AUTO: 11 % (ref 4–12)
NEUTROPHILS # BLD AUTO: 3.24 THOUSANDS/ΜL (ref 1.85–7.62)
NEUTS SEG NFR BLD AUTO: 52 % (ref 43–75)
PLATELET # BLD AUTO: 350 THOUSANDS/UL (ref 149–390)
PMV BLD AUTO: 8.9 FL (ref 8.9–12.7)
RBC # BLD AUTO: 3.99 MILLION/UL (ref 3.81–5.12)
RETICS # AUTO: ABNORMAL 10*3/UL (ref 14097–95744)
RETICS # CALC: 1.98 % (ref 0.37–1.87)
WBC # BLD AUTO: 6.18 THOUSAND/UL (ref 4.31–10.16)

## 2019-05-29 PROCEDURE — 85025 COMPLETE CBC W/AUTO DIFF WBC: CPT

## 2019-05-29 PROCEDURE — 96365 THER/PROPH/DIAG IV INF INIT: CPT

## 2019-05-29 PROCEDURE — 85045 AUTOMATED RETICULOCYTE COUNT: CPT

## 2019-05-29 PROCEDURE — 82728 ASSAY OF FERRITIN: CPT

## 2019-05-29 RX ORDER — SODIUM CHLORIDE 9 MG/ML
20 INJECTION, SOLUTION INTRAVENOUS ONCE
Status: CANCELLED | OUTPATIENT
Start: 2019-06-01

## 2019-05-29 RX ORDER — SODIUM CHLORIDE 9 MG/ML
20 INJECTION, SOLUTION INTRAVENOUS ONCE
Status: COMPLETED | OUTPATIENT
Start: 2019-05-29 | End: 2019-05-29

## 2019-05-29 RX ADMIN — SODIUM CHLORIDE 20 ML/HR: 0.9 INJECTION, SOLUTION INTRAVENOUS at 14:48

## 2019-05-29 RX ADMIN — IRON SUCROSE 200 MG: 20 INJECTION, SOLUTION INTRAVENOUS at 15:05

## 2019-05-29 NOTE — PROGRESS NOTES
Bedside and Verbal shift change report given to edu desai rn (oncoming nurse) by haydee swanson rn (offgoing nurse). Report included the following information SBAR, Kardex, Procedure Summary, Intake/Output, MAR, Accordion and Recent Results. Pt  Denies new symptoms or concerns at this time  Baseline labs obtained today as pt  Did not have them drawn prior to first Venofer

## 2019-05-29 NOTE — PLAN OF CARE
Problem: PAIN - ADULT  Goal: Verbalizes/displays adequate comfort level or baseline comfort level  Description  Interventions:  - Encourage patient to monitor pain and request assistance  - Assess pain using appropriate pain scale  - Administer analgesics based on type and severity of pain and evaluate response  - Implement non-pharmacological measures as appropriate and evaluate response  - Consider cultural and social influences on pain and pain management  - Notify physician/advanced practitioner if interventions unsuccessful or patient reports new pain  Outcome: Progressing     Problem: INFECTION - ADULT  Goal: Absence or prevention of progression during hospitalization  Description  INTERVENTIONS:  - Assess and monitor for signs and symptoms of infection  - Monitor lab/diagnostic results  - Monitor all insertion sites, i e  indwelling lines, tubes, and drains  - Monitor endotracheal (as able) and nasal secretions for changes in amount and color  - [place] appropriate cooling/warming therapies per order  - Administer medications as ordered  - Instruct and encourage patient and family to use good hand hygiene technique  - Identify and instruct in appropriate isolation precautions for identified infection/condition  Outcome: Progressing  Goal: Absence of fever/infection during neutropenic period  Description  INTERVENTIONS:  - Monitor WBC  - Implement neutropenic guidelines  Outcome: Progressing     Problem: Knowledge Deficit  Goal: Patient/family/caregiver demonstrates understanding of disease process, treatment plan, medications, and discharge instructions  Description  Complete learning assessment and assess knowledge base    Interventions:  - Provide teaching at level of understanding  - Provide teaching via preferred learning methods  Outcome: Progressing

## 2019-05-30 ENCOUNTER — HOSPITAL ENCOUNTER (OUTPATIENT)
Dept: INFUSION CENTER | Facility: CLINIC | Age: 59
End: 2019-05-30

## 2019-06-04 ENCOUNTER — HOSPITAL ENCOUNTER (OUTPATIENT)
Dept: INFUSION CENTER | Facility: CLINIC | Age: 59
Discharge: HOME | End: 2019-06-04

## 2019-06-05 ENCOUNTER — HOSPITAL ENCOUNTER (OUTPATIENT)
Dept: INFUSION CENTER | Facility: CLINIC | Age: 59
Discharge: HOME | End: 2019-06-05
Payer: MEDICARE

## 2019-06-05 VITALS
DIASTOLIC BLOOD PRESSURE: 87 MMHG | RESPIRATION RATE: 18 BRPM | HEART RATE: 83 BPM | TEMPERATURE: 98.7 F | SYSTOLIC BLOOD PRESSURE: 138 MMHG

## 2019-06-05 DIAGNOSIS — D50.8 IRON DEFICIENCY ANEMIA SECONDARY TO INADEQUATE DIETARY IRON INTAKE: ICD-10-CM

## 2019-06-05 DIAGNOSIS — K91.2 POSTGASTRECTOMY MALABSORPTION: ICD-10-CM

## 2019-06-05 DIAGNOSIS — D50.8 IRON DEFICIENCY ANEMIA SECONDARY TO INADEQUATE DIETARY IRON INTAKE: Primary | ICD-10-CM

## 2019-06-05 DIAGNOSIS — Z90.3 POSTGASTRECTOMY MALABSORPTION: ICD-10-CM

## 2019-06-05 PROCEDURE — 96365 THER/PROPH/DIAG IV INF INIT: CPT

## 2019-06-05 RX ORDER — SODIUM CHLORIDE 9 MG/ML
20 INJECTION, SOLUTION INTRAVENOUS ONCE
Status: COMPLETED | OUTPATIENT
Start: 2019-06-05 | End: 2019-06-05

## 2019-06-05 RX ORDER — SODIUM CHLORIDE 9 MG/ML
20 INJECTION, SOLUTION INTRAVENOUS ONCE
Status: CANCELLED | OUTPATIENT
Start: 2019-06-08

## 2019-06-05 RX ADMIN — IRON SUCROSE 200 MG: 20 INJECTION, SOLUTION INTRAVENOUS at 14:00

## 2019-06-05 RX ADMIN — SODIUM CHLORIDE 20 ML/HR: 0.9 INJECTION, SOLUTION INTRAVENOUS at 13:46

## 2019-06-05 NOTE — PLAN OF CARE
Problem: Potential for Falls  Goal: Patient will remain free of falls  Description  INTERVENTIONS:  - Assess patient frequently for physical needs  -  Identify cognitive and physical deficits and behaviors that affect risk of falls    -  [place] fall precautions as indicated by assessment   - Educate patient/family on patient safety including physical limitations  - Instruct patient to call for assistance with activity based on assessment  - Modify environment to reduce risk of injury  - Consider OT/PT consult to assist with strengthening/mobility  Outcome: Progressing

## 2019-06-07 ENCOUNTER — HOSPITAL ENCOUNTER (OUTPATIENT)
Dept: INFUSION CENTER | Facility: CLINIC | Age: 59
Discharge: HOME | End: 2019-06-07
Payer: MEDICARE

## 2019-06-07 VITALS
SYSTOLIC BLOOD PRESSURE: 125 MMHG | TEMPERATURE: 97.8 F | DIASTOLIC BLOOD PRESSURE: 82 MMHG | RESPIRATION RATE: 18 BRPM | HEART RATE: 80 BPM

## 2019-06-07 DIAGNOSIS — K91.2 POSTGASTRECTOMY MALABSORPTION: ICD-10-CM

## 2019-06-07 DIAGNOSIS — Z90.3 POSTGASTRECTOMY MALABSORPTION: ICD-10-CM

## 2019-06-07 DIAGNOSIS — D50.8 IRON DEFICIENCY ANEMIA SECONDARY TO INADEQUATE DIETARY IRON INTAKE: Primary | ICD-10-CM

## 2019-06-07 PROCEDURE — 96365 THER/PROPH/DIAG IV INF INIT: CPT

## 2019-06-07 RX ORDER — SODIUM CHLORIDE 9 MG/ML
20 INJECTION, SOLUTION INTRAVENOUS ONCE
Status: COMPLETED | OUTPATIENT
Start: 2019-06-07 | End: 2019-06-07

## 2019-06-07 RX ORDER — SODIUM CHLORIDE 9 MG/ML
20 INJECTION, SOLUTION INTRAVENOUS ONCE
Status: CANCELLED | OUTPATIENT
Start: 2019-06-11

## 2019-06-07 RX ORDER — SODIUM CHLORIDE 9 MG/ML
20 INJECTION, SOLUTION INTRAVENOUS ONCE
Status: CANCELLED | OUTPATIENT
Start: 2019-06-07

## 2019-06-07 RX ADMIN — IRON SUCROSE 200 MG: 20 INJECTION, SOLUTION INTRAVENOUS at 13:44

## 2019-06-07 RX ADMIN — SODIUM CHLORIDE 20 ML/HR: 0.9 INJECTION, SOLUTION INTRAVENOUS at 13:44

## 2019-06-07 NOTE — PROGRESS NOTES
Patient tolerated Venofer infusion well  Offers no complaints  Pt is aware of all future appointments   Refused AVS

## 2019-06-11 ENCOUNTER — HOSPITAL ENCOUNTER (OUTPATIENT)
Dept: INFUSION CENTER | Facility: CLINIC | Age: 59
Discharge: HOME | End: 2019-06-11
Payer: MEDICARE

## 2019-06-11 VITALS
RESPIRATION RATE: 18 BRPM | TEMPERATURE: 97.2 F | SYSTOLIC BLOOD PRESSURE: 113 MMHG | DIASTOLIC BLOOD PRESSURE: 80 MMHG | HEART RATE: 92 BPM

## 2019-06-11 DIAGNOSIS — D50.8 IRON DEFICIENCY ANEMIA SECONDARY TO INADEQUATE DIETARY IRON INTAKE: ICD-10-CM

## 2019-06-11 DIAGNOSIS — Z90.3 POSTGASTRECTOMY MALABSORPTION: Primary | ICD-10-CM

## 2019-06-11 DIAGNOSIS — K91.2 POSTGASTRECTOMY MALABSORPTION: Primary | ICD-10-CM

## 2019-06-11 PROCEDURE — 96365 THER/PROPH/DIAG IV INF INIT: CPT

## 2019-06-11 RX ORDER — SODIUM CHLORIDE 9 MG/ML
20 INJECTION, SOLUTION INTRAVENOUS ONCE
Status: CANCELLED | OUTPATIENT
Start: 2019-06-13

## 2019-06-11 RX ORDER — SODIUM CHLORIDE 9 MG/ML
20 INJECTION, SOLUTION INTRAVENOUS ONCE
Status: COMPLETED | OUTPATIENT
Start: 2019-06-11 | End: 2019-06-11

## 2019-06-11 RX ADMIN — SODIUM CHLORIDE 20 ML/HR: 9 INJECTION, SOLUTION INTRAVENOUS at 13:50

## 2019-06-11 RX ADMIN — IRON SUCROSE 200 MG: 20 INJECTION, SOLUTION INTRAVENOUS at 13:50

## 2019-06-11 NOTE — PLAN OF CARE
Problem: Potential for Falls  Goal: Patient will remain free of falls  Description  INTERVENTIONS:  - Assess patient frequently for physical needs  -  Identify cognitive and physical deficits and behaviors that affect risk of falls    -  [place] fall precautions as indicated by assessment   - Educate patient/family on patient safety including physical limitations  - Instruct patient to call for assistance with activity based on assessment  - Modify environment to reduce risk of injury  - Consider OT/PT consult to assist with strengthening/mobility  Outcome: Progressing

## 2019-06-13 ENCOUNTER — HOSPITAL ENCOUNTER (OUTPATIENT)
Dept: INFUSION CENTER | Facility: CLINIC | Age: 59
Discharge: HOME | End: 2019-06-13
Payer: MEDICARE

## 2019-06-13 VITALS
SYSTOLIC BLOOD PRESSURE: 122 MMHG | TEMPERATURE: 96 F | DIASTOLIC BLOOD PRESSURE: 82 MMHG | RESPIRATION RATE: 18 BRPM | HEART RATE: 86 BPM

## 2019-06-13 DIAGNOSIS — K91.2 POSTGASTRECTOMY MALABSORPTION: Primary | ICD-10-CM

## 2019-06-13 DIAGNOSIS — Z90.3 POSTGASTRECTOMY MALABSORPTION: Primary | ICD-10-CM

## 2019-06-13 DIAGNOSIS — D50.8 IRON DEFICIENCY ANEMIA SECONDARY TO INADEQUATE DIETARY IRON INTAKE: ICD-10-CM

## 2019-06-13 PROCEDURE — 96365 THER/PROPH/DIAG IV INF INIT: CPT

## 2019-06-13 RX ORDER — SODIUM CHLORIDE 9 MG/ML
20 INJECTION, SOLUTION INTRAVENOUS ONCE
Status: COMPLETED | OUTPATIENT
Start: 2019-06-13 | End: 2019-06-13

## 2019-06-13 RX ORDER — SODIUM CHLORIDE 9 MG/ML
20 INJECTION, SOLUTION INTRAVENOUS ONCE
Status: CANCELLED | OUTPATIENT
Start: 2019-06-14

## 2019-06-13 RX ADMIN — IRON SUCROSE 200 MG: 20 INJECTION, SOLUTION INTRAVENOUS at 13:57

## 2019-06-13 RX ADMIN — SODIUM CHLORIDE 20 ML/HR: 0.9 INJECTION, SOLUTION INTRAVENOUS at 13:57

## 2019-06-13 NOTE — PROGRESS NOTES
Pt tolerated Venofer infusion well  No further Venofer infusions ordered or scheduled  Pt left unit in stable condition  AVS provided

## 2019-06-27 ENCOUNTER — TELEPHONE (OUTPATIENT)
Dept: FAMILY MEDICINE CLINIC | Facility: CLINIC | Age: 59
End: 2019-06-27

## 2019-06-27 NOTE — TELEPHONE ENCOUNTER
Just kept saying HELLO and when I would ask for her, she would just say hello again  Called back and said all she heard was static  She did do it but never brought it to a lab so needs a new one  Will get when she comes in office for her b12 shot

## 2019-07-01 ENCOUNTER — CLINICAL SUPPORT (OUTPATIENT)
Dept: FAMILY MEDICINE CLINIC | Facility: CLINIC | Age: 59
End: 2019-07-01
Payer: MEDICARE

## 2019-07-01 DIAGNOSIS — E53.8 VITAMIN B 12 DEFICIENCY: Primary | ICD-10-CM

## 2019-07-01 PROCEDURE — 96372 THER/PROPH/DIAG INJ SC/IM: CPT

## 2019-07-01 RX ORDER — CYANOCOBALAMIN 1000 UG/ML
1000 INJECTION INTRAMUSCULAR; SUBCUTANEOUS
Status: DISCONTINUED | OUTPATIENT
Start: 2019-07-01 | End: 2021-04-15

## 2019-07-01 RX ADMIN — CYANOCOBALAMIN 1000 MCG: 1000 INJECTION INTRAMUSCULAR; SUBCUTANEOUS at 15:29

## 2019-07-16 ENCOUNTER — APPOINTMENT (OUTPATIENT)
Dept: LAB | Facility: CLINIC | Age: 59
End: 2019-07-16
Payer: MEDICARE

## 2019-07-16 ENCOUNTER — TRANSCRIBE ORDERS (OUTPATIENT)
Dept: LAB | Facility: CLINIC | Age: 59
End: 2019-07-16

## 2019-07-16 DIAGNOSIS — D50.8 IRON DEFICIENCY ANEMIA SECONDARY TO INADEQUATE DIETARY IRON INTAKE: ICD-10-CM

## 2019-07-16 DIAGNOSIS — Z90.3 POSTGASTRECTOMY MALABSORPTION: ICD-10-CM

## 2019-07-16 DIAGNOSIS — K91.2 POSTGASTRECTOMY MALABSORPTION: ICD-10-CM

## 2019-07-16 DIAGNOSIS — D50.8 IRON DEFICIENCY ANEMIA SECONDARY TO INADEQUATE DIETARY IRON INTAKE: Primary | ICD-10-CM

## 2019-07-16 DIAGNOSIS — Z11.59 ENCOUNTER FOR HEPATITIS C VIRUS SCREENING TEST FOR HIGH RISK PATIENT: ICD-10-CM

## 2019-07-16 DIAGNOSIS — Z91.89 ENCOUNTER FOR HEPATITIS C VIRUS SCREENING TEST FOR HIGH RISK PATIENT: ICD-10-CM

## 2019-07-16 LAB
BASOPHILS # BLD AUTO: 0.04 THOUSANDS/ΜL (ref 0–0.1)
BASOPHILS NFR BLD AUTO: 1 % (ref 0–1)
EOSINOPHIL # BLD AUTO: 0.06 THOUSAND/ΜL (ref 0–0.61)
EOSINOPHIL NFR BLD AUTO: 1 % (ref 0–6)
ERYTHROCYTE [DISTWIDTH] IN BLOOD BY AUTOMATED COUNT: 21.4 % (ref 11.6–15.1)
FERRITIN SERPL-MCNC: 154 NG/ML (ref 8–388)
HCT VFR BLD AUTO: 36.1 % (ref 34.8–46.1)
HGB BLD-MCNC: 11.5 G/DL (ref 11.5–15.4)
IMM GRANULOCYTES # BLD AUTO: 0.05 THOUSAND/UL (ref 0–0.2)
IMM GRANULOCYTES NFR BLD AUTO: 1 % (ref 0–2)
IRON SATN MFR SERPL: 22 %
IRON SERPL-MCNC: 75 UG/DL (ref 50–170)
LYMPHOCYTES # BLD AUTO: 2.06 THOUSANDS/ΜL (ref 0.6–4.47)
LYMPHOCYTES NFR BLD AUTO: 32 % (ref 14–44)
MCH RBC QN AUTO: 27.1 PG (ref 26.8–34.3)
MCHC RBC AUTO-ENTMCNC: 31.9 G/DL (ref 31.4–37.4)
MCV RBC AUTO: 85 FL (ref 82–98)
MONOCYTES # BLD AUTO: 0.53 THOUSAND/ΜL (ref 0.17–1.22)
MONOCYTES NFR BLD AUTO: 8 % (ref 4–12)
NEUTROPHILS # BLD AUTO: 3.68 THOUSANDS/ΜL (ref 1.85–7.62)
NEUTS SEG NFR BLD AUTO: 57 % (ref 43–75)
NRBC BLD AUTO-RTO: 0 /100 WBCS
PLATELET # BLD AUTO: 279 THOUSANDS/UL (ref 149–390)
PMV BLD AUTO: 10 FL (ref 8.9–12.7)
RBC # BLD AUTO: 4.25 MILLION/UL (ref 3.81–5.12)
TIBC SERPL-MCNC: 348 UG/DL (ref 250–450)
WBC # BLD AUTO: 6.42 THOUSAND/UL (ref 4.31–10.16)

## 2019-07-16 PROCEDURE — 86803 HEPATITIS C AB TEST: CPT

## 2019-07-16 PROCEDURE — 85025 COMPLETE CBC W/AUTO DIFF WBC: CPT

## 2019-07-16 PROCEDURE — 82728 ASSAY OF FERRITIN: CPT

## 2019-07-16 PROCEDURE — 83550 IRON BINDING TEST: CPT

## 2019-07-16 PROCEDURE — 36415 COLL VENOUS BLD VENIPUNCTURE: CPT

## 2019-07-16 PROCEDURE — 83540 ASSAY OF IRON: CPT

## 2019-07-17 ENCOUNTER — OFFICE VISIT (OUTPATIENT)
Dept: HEMATOLOGY ONCOLOGY | Facility: CLINIC | Age: 59
End: 2019-07-17
Payer: MEDICARE

## 2019-07-17 VITALS
RESPIRATION RATE: 16 BRPM | BODY MASS INDEX: 42.17 KG/M2 | HEART RATE: 85 BPM | TEMPERATURE: 97.2 F | WEIGHT: 247 LBS | DIASTOLIC BLOOD PRESSURE: 70 MMHG | OXYGEN SATURATION: 98 % | HEIGHT: 64 IN | SYSTOLIC BLOOD PRESSURE: 120 MMHG

## 2019-07-17 DIAGNOSIS — K91.2 POSTGASTRECTOMY MALABSORPTION: ICD-10-CM

## 2019-07-17 DIAGNOSIS — Z90.3 POSTGASTRECTOMY MALABSORPTION: ICD-10-CM

## 2019-07-17 DIAGNOSIS — D50.8 IRON DEFICIENCY ANEMIA SECONDARY TO INADEQUATE DIETARY IRON INTAKE: Primary | ICD-10-CM

## 2019-07-17 LAB — HCV AB SER QL: NORMAL

## 2019-07-17 PROCEDURE — 99213 OFFICE O/P EST LOW 20 MIN: CPT | Performed by: PHYSICIAN ASSISTANT

## 2019-07-17 RX ORDER — SODIUM CHLORIDE 9 MG/ML
20 INJECTION, SOLUTION INTRAVENOUS ONCE
Status: CANCELLED | OUTPATIENT
Start: 2019-12-09

## 2019-07-17 NOTE — PROGRESS NOTES
Hematology/Oncology Outpatient Follow-up  Shimon Herman 60 y o  female 1960 [id number]    Date:  7/17/2019      Assessment and Plan:    1  Iron deficiency anemia secondary to inadequate dietary iron intake, 2  Postgastrectomy malabsorption  58 year from from several presenting in deficiency anemia  This is secondary to malabsorption secondary to previous gastric bypass surgery  She required IV iron therapy previously approximately 7 years ago  Hemoglobin has now improved to 11 5, previously 9 9 prior to infusions  Ferritin also improved from 51 to 154  Reviewed with patient that maintenance IV iron therapy is recommended  She will received Venofer 200 mg every 6 months  She did not complete stool testing as requested  Another was provided  She states that she will schedule her mammogram on her own  She states that she will schedule appointment with GI following the surgery  Follow-up in 9 months     - Occult Blood, Fecal Immunochemical  - CBC and differential  - Iron Panel; Future      HPI:  19-year-old female presents for follow-up  She was seen in consultation in May 2019  She was referred due to decreased hemoglobin prior to surgery  Her hemoglobin was 9 9 MCV was 75  She stated that her endoscopy and colonoscopy more than 5 years ago  She had gastric bypass surgery approximately 15-20 years ago  She has been getting B12 injections  She had required IV iron therapy in the past as well approximately 7 years ago  She received Venofer 200 mg weekly x6  Interval history:    ROS: Review of Systems   Constitutional: Positive for fatigue and unexpected weight change (weight gain )  Negative for appetite change, chills and fever  HENT: Negative for mouth sores and nosebleeds  Respiratory: Positive for shortness of breath (with exertion )  Negative for cough  Cardiovascular: Negative for chest pain, palpitations and leg swelling     Gastrointestinal: Negative for abdominal pain, blood in stool, constipation, diarrhea, nausea and vomiting  Genitourinary: Negative for difficulty urinating, dysuria and hematuria  Musculoskeletal: Positive for arthralgias (bilateral knees, back )  Skin: Negative  Neurological: Positive for dizziness (chronic, unchanged) and headaches (chronic, unchanged )  Negative for weakness, light-headedness and numbness  Hematological: Negative  Psychiatric/Behavioral: Negative  Past Medical History:   Diagnosis Date    Bipolar affective disorder, depressed ([facility] [address] )     Heart murmur     Osteoarthritis     Psychiatric disorder     Vitamin B12 deficiency        Past Surgical History:   Procedure Laterality Date    BREAST LUMPECTOMY Right     CERVICAL SPINE SURGERY  2002    CHOLECYSTECTOMY  05/1985    FOOT FRACTURE SURGERY Right 2012    outcome - good    FOOT SURGERY Left     GASTRIC BYPASS  2004    x2- 2nd bypass 10/12    HYSTERECTOMY      KNEE SURGERY Right        Social History     Socioeconomic History    Marital status:      Spouse name: Not on file    Number of children: Not on file    Years of education: Not on file    Highest education level: Not on file   Occupational History    Not on file   Social Needs    Financial resource strain: Not on file    Food insecurity:     Worry: Not on file     Inability: Not on file    Transportation needs:     Medical: Not on file     Non-medical: Not on file   Tobacco Use    Smoking status: Never Smoker    Smokeless tobacco: Never Used    Tobacco comment: passive smoke exposure   Substance and Sexual Activity    Alcohol use:  Yes    Drug use: No    Sexual activity: Not on file   Lifestyle    Physical activity:     Days per week: Not on file     Minutes per session: Not on file    Stress: Not on file   Relationships    Social connections:     Talks on phone: Not on file     Gets together: Not on file     Attends [employer] service: Not on file     Active member of club or organization: Not on file     Attends meetings of clubs or organizations: Not on file     Relationship status: Not on file    Intimate partner violence:     Fear of current or ex partner: Not on file     Emotionally abused: Not on file     Physically abused: Not on file     Forced sexual activity: Not on file   Other Topics Concern    Not on file   Social History Narrative    Not on file       Family History   Problem Relation Age of Onset    Diabetes Father     Heart disease Father     Coronary artery disease Family     Heart attack Family        Allergies   Allergen Reactions    Prochlorperazine Other (See Comments)     seizure,swollen tongue    Rofecoxib Other (See Comments)     internal bleeding    Erythromycin Base Other (See Comments)     seizure         Current Outpatient Medications:     acetaminophen (TYLENOL) 650 mg CR tablet, 1 as directed for pain, Disp: , Rfl:     Azelastine-Fluticasone 137-50 MCG/ACT SUSP, Every 12 hours, Disp: , Rfl:     Buprenorphine (BUTRANS) 5 MCG/HR PTWK, apply 1 patch by transdermal route  every 7 days, Disp: , Rfl:     Buprenorphine 10 MCG/HR PTWK, APPLY 1 PATCH ON THE SKIN WEEKLY, Disp: , Rfl: 0    BUPROPION HCL ER, XL, PO, every 24 hours  , Disp: , Rfl:     clotrimazole-betamethasone (LOTRISONE) 1-0 05 % cream, Apply topically 2 (two) times a day, Disp: 30 g, Rfl: 2    clotrimazole-betamethasone (LOTRISONE) 1-0 05 % cream, Apply topically 2 (two) times a day, Disp: 30 g, Rfl: 0    DULoxetine (CYMBALTA) 60 mg delayed release capsule, every 12 (twelve) hours, Disp: , Rfl:     GABAPENTIN PO, Take by mouth, Disp: , Rfl:     ibuprofen (MOTRIN) 200 mg tablet, Reported on 7/10/2017 , Disp: , Rfl:     levocetirizine (XYZAL) 5 MG tablet, Take 1 tablet (5 mg total) by mouth every evening, Disp: 30 tablet, Rfl: 3    nystatin (MYCOSTATIN) powder, Apply topically 2 (two) times a day, Disp: 45 g, Rfl: 2    omeprazole (PRILOSEC OTC) 20 MG tablet, Take 1 tablet (20 mg total) by mouth 2 (two) times a day, Disp: 60 tablet, Rfl: 3    QUEtiapine Fumarate (SEROQUEL PO), Take by mouth, Disp: , Rfl:     tiZANidine (ZANAFLEX) 4 mg tablet, Take 4 mg by mouth 3 (three) times a day, Disp: , Rfl: 0    Current Facility-Administered Medications:     cyanocobalamin injection 1,000 mcg, 1,000 mcg, Intramuscular, Q30 Days, Waqar Gorman MD, 1,000 mcg at 09/20/18 1527    cyanocobalamin injection 1,000 mcg, 1,000 mcg, Intramuscular, Q30 Days, Waqar Gorman MD, 1,000 mcg at 11/06/18 0853    cyanocobalamin injection 1,000 mcg, 1,000 mcg, Intramuscular, Q30 Days, Waqar Gorman MD, 1,000 mcg at 12/06/18 1631    cyanocobalamin injection 1,000 mcg, 1,000 mcg, Intramuscular, Q30 Days, Jac Shaw MD, 1,000 mcg at 01/07/19 1443    cyanocobalamin injection 1,000 mcg, 1,000 mcg, Intramuscular, Q30 Days, Jac Shaw MD, 1,000 mcg at 03/08/19 1501    cyanocobalamin injection 1,000 mcg, 1,000 mcg, Intramuscular, Q30 Days, WHITNEY Hwang, 1,000 mcg at 03/11/19 1644    cyanocobalamin injection 1,000 mcg, 1,000 mcg, Intramuscular, Q30 Days, Jac Shaw MD, 1,000 mcg at 04/08/19 1317    cyanocobalamin injection 1,000 mcg, 1,000 mcg, Intramuscular, Q30 Days, Jac Shaw MD, 1,000 mcg at 07/01/19 1529      Physical Exam:  /70   Pulse 85   Temp (!) 97 2 °F (36 2 °C) (Tympanic)   Resp 16   Ht 5' 3 5" (1 613 m)   Wt 112 kg (247 lb)   SpO2 98%   BMI 43 07 kg/m²     Physical Exam   Constitutional: She is oriented to person, place, and time  She appears well-developed and well-nourished  No distress  HENT:   Head: Normocephalic and atraumatic  Eyes: Conjunctivae are normal  No scleral icterus  Neck: Normal range of motion  Neck supple  Cardiovascular: Normal rate, regular rhythm and normal heart sounds  No murmur heard  Pulmonary/Chest: Effort normal and breath sounds normal  No respiratory distress  Abdominal: Soft  There is no tenderness     Musculoskeletal: Normal range of motion  She exhibits no edema or tenderness  Lymphadenopathy:     She has no cervical adenopathy  Neurological: She is alert and oriented to person, place, and time  No cranial nerve deficit  Skin: Skin is warm and dry  Psychiatric: She has a normal mood and affect  Vitals reviewed  Labs:  Lab Results   Component Value Date    WBC 6 42 07/16/2019    HGB 11 5 07/16/2019    HCT 36 1 07/16/2019    MCV 85 07/16/2019     07/16/2019         Patient voiced understanding and agreement in the above discussion  Aware to contact our office with questions/symptoms in the interim

## 2019-07-17 NOTE — LETTER
July 17, 2019     Haja Irene MD  [address] [facility] [address]    Patient: Maude Gil   YOB: 1960   Date of Visit: 7/17/2019       Dear Dr Mandy Freed: Thank you for referring Garfield Espinoza to me for evaluation  Below are my notes for this consultation  If you have questions, please do not hesitate to call me  I look forward to following your patient along with you  Sincerely,        Gustavo Morse PA-C        CC: No Recipients  Gustavo Morse PA-C  7/17/2019  2:00 PM  Sign at close encounter  Hematology/Oncology Outpatient Follow-up  Maude Gil 60 y o  female 1960 [id number]    Date:  7/17/2019      Assessment and Plan:    1  Iron deficiency anemia secondary to inadequate dietary iron intake, 2  Postgastrectomy malabsorption  58 year from from several presenting in deficiency anemia  This is secondary to malabsorption secondary to previous gastric bypass surgery  She required IV iron therapy previously approximately 7 years ago  Hemoglobin has now improved to 11 5, previously 9 9 prior to infusions  Ferritin also improved from 51 to 154  Reviewed with patient that maintenance IV iron therapy is recommended  She will received Venofer 200 mg every 6 months  She did not complete stool testing as requested  Another was provided  She states that she will schedule her mammogram on her own  She states that she will schedule appointment with GI following the surgery  Follow-up in 9 months     - Occult Blood, Fecal Immunochemical  - CBC and differential  - Iron Panel; Future      HPI:  62-year-old female presents for follow-up  She was seen in consultation in May 2019  She was referred due to decreased hemoglobin prior to surgery  Her hemoglobin was 9 9 MCV was 75  She stated that her endoscopy and colonoscopy more than 5 years ago  She had gastric bypass surgery approximately 15-20 years ago    She has been getting B12 injections  She had required IV iron therapy in the past as well approximately 7 years ago  She received Venofer 200 mg weekly x6  Interval history:    ROS: Review of Systems   Constitutional: Positive for fatigue and unexpected weight change (weight gain )  Negative for appetite change, chills and fever  HENT: Negative for mouth sores and nosebleeds  Respiratory: Positive for shortness of breath (with exertion )  Negative for cough  Cardiovascular: Negative for chest pain, palpitations and leg swelling  Gastrointestinal: Negative for abdominal pain, blood in stool, constipation, diarrhea, nausea and vomiting  Genitourinary: Negative for difficulty urinating, dysuria and hematuria  Musculoskeletal: Positive for arthralgias (bilateral knees, back )  Skin: Negative  Neurological: Positive for dizziness (chronic, unchanged) and headaches (chronic, unchanged )  Negative for weakness, light-headedness and numbness  Hematological: Negative  Psychiatric/Behavioral: Negative          Past Medical History:   Diagnosis Date    Bipolar affective disorder, depressed ([facility] [address] )     Heart murmur     Osteoarthritis     Psychiatric disorder     Vitamin B12 deficiency        Past Surgical History:   Procedure Laterality Date    BREAST LUMPECTOMY Right     CERVICAL SPINE SURGERY  2002    CHOLECYSTECTOMY  05/1985    FOOT FRACTURE SURGERY Right 2012    outcome - good    FOOT SURGERY Left     GASTRIC BYPASS  2004    x2- 2nd bypass 10/12    HYSTERECTOMY      KNEE SURGERY Right        Social History     Socioeconomic History    Marital status:      Spouse name: Not on file    Number of children: Not on file    Years of education: Not on file    Highest education level: Not on file   Occupational History    Not on file   Social Needs    Financial resource strain: Not on file    Food insecurity:     Worry: Not on file     Inability: Not on file    Transportation needs:     Medical: Not on file     Non-medical: Not on file   Tobacco Use    Smoking status: Never Smoker    Smokeless tobacco: Never Used    Tobacco comment: passive smoke exposure   Substance and Sexual Activity    Alcohol use:  Yes    Drug use: No    Sexual activity: Not on file   Lifestyle    Physical activity:     Days per week: Not on file     Minutes per session: Not on file    Stress: Not on file   Relationships    Social connections:     Talks on phone: Not on file     Gets together: Not on file     Attends Tenriism service: Not on file     Active member of club or organization: Not on file     Attends meetings of clubs or organizations: Not on file     Relationship status: Not on file    Intimate partner violence:     Fear of current or ex partner: Not on file     Emotionally abused: Not on file     Physically abused: Not on file     Forced sexual activity: Not on file   Other Topics Concern    Not on file   Social History Narrative    Not on file       Family History   Problem Relation Age of Onset    Diabetes Father     Heart disease Father     Coronary artery disease Family     Heart attack Family        Allergies   Allergen Reactions    Prochlorperazine Other (See Comments)     seizure,swollen tongue    Rofecoxib Other (See Comments)     internal bleeding    Erythromycin Base Other (See Comments)     seizure         Current Outpatient Medications:     acetaminophen (TYLENOL) 650 mg CR tablet, 1 as directed for pain, Disp: , Rfl:     Azelastine-Fluticasone 137-50 MCG/ACT SUSP, Every 12 hours, Disp: , Rfl:     Buprenorphine (BUTRANS) 5 MCG/HR PTWK, apply 1 patch by transdermal route  every 7 days, Disp: , Rfl:     Buprenorphine 10 MCG/HR PTWK, APPLY 1 PATCH ON THE SKIN WEEKLY, Disp: , Rfl: 0    BUPROPION HCL ER, XL, PO, every 24 hours  , Disp: , Rfl:     clotrimazole-betamethasone (LOTRISONE) 1-0 05 % cream, Apply topically 2 (two) times a day, Disp: 30 g, Rfl: 2    clotrimazole-betamethasone (LOTRISONE) 1-0 05 % cream, Apply topically 2 (two) times a day, Disp: 30 g, Rfl: 0    DULoxetine (CYMBALTA) 60 mg delayed release capsule, every 12 (twelve) hours, Disp: , Rfl:     GABAPENTIN PO, Take by mouth, Disp: , Rfl:     ibuprofen (MOTRIN) 200 mg tablet, Reported on 7/10/2017 , Disp: , Rfl:     levocetirizine (XYZAL) 5 MG tablet, Take 1 tablet (5 mg total) by mouth every evening, Disp: 30 tablet, Rfl: 3    nystatin (MYCOSTATIN) powder, Apply topically 2 (two) times a day, Disp: 45 g, Rfl: 2    omeprazole (PRILOSEC OTC) 20 MG tablet, Take 1 tablet (20 mg total) by mouth 2 (two) times a day, Disp: 60 tablet, Rfl: 3    QUEtiapine Fumarate (SEROQUEL PO), Take by mouth, Disp: , Rfl:     tiZANidine (ZANAFLEX) 4 mg tablet, Take 4 mg by mouth 3 (three) times a day, Disp: , Rfl: 0    Current Facility-Administered Medications:     cyanocobalamin injection 1,000 mcg, 1,000 mcg, Intramuscular, Q30 Days, Radha Conner MD, 1,000 mcg at 09/20/18 1527    cyanocobalamin injection 1,000 mcg, 1,000 mcg, Intramuscular, Q30 Days, Radha Conner MD, 1,000 mcg at 11/06/18 0853    cyanocobalamin injection 1,000 mcg, 1,000 mcg, Intramuscular, Q30 Days, Radha Conner MD, 1,000 mcg at 12/06/18 1631    cyanocobalamin injection 1,000 mcg, 1,000 mcg, Intramuscular, Q30 Days, Clinton Wallis MD, 1,000 mcg at 01/07/19 1443    cyanocobalamin injection 1,000 mcg, 1,000 mcg, Intramuscular, Q30 Days, Clinton Wallis MD, 1,000 mcg at 03/08/19 1501    cyanocobalamin injection 1,000 mcg, 1,000 mcg, Intramuscular, Q30 Days, WHITNEY Hwang, 1,000 mcg at 03/11/19 1644    cyanocobalamin injection 1,000 mcg, 1,000 mcg, Intramuscular, Q30 Days, Clinton Wallis MD, 1,000 mcg at 04/08/19 1317    cyanocobalamin injection 1,000 mcg, 1,000 mcg, Intramuscular, Q30 Days, Clinton Wallis MD, 1,000 mcg at 07/01/19 1529      Physical Exam:  /70   Pulse 85   Temp (!) 97 2 °F (36 2 °C) (Tympanic)   Resp 16   Ht 5' 3 5" (1 613 m)   [place] 112 kg (247 lb)   SpO2 98%   BMI 43 07 kg/m²      Physical Exam   Constitutional: She is oriented to person, place, and time  She appears well-developed and well-nourished  No distress  HENT:   Head: Normocephalic and atraumatic  Eyes: Conjunctivae are normal  No scleral icterus  Neck: Normal range of motion  Neck supple  Cardiovascular: Normal rate, regular rhythm and normal heart sounds  No murmur heard  Pulmonary/Chest: Effort normal and breath sounds normal  No respiratory distress  Abdominal: Soft  There is no tenderness  Musculoskeletal: Normal range of motion  She exhibits no edema or tenderness  Lymphadenopathy:     She has no cervical adenopathy  Neurological: She is alert and oriented to person, place, and time  No cranial nerve deficit  Skin: Skin is warm and dry  Psychiatric: She has a normal mood and affect  Vitals reviewed  Labs:  Lab Results   Component Value Date    WBC 6 42 07/16/2019    HGB 11 5 07/16/2019    HCT 36 1 07/16/2019    MCV 85 07/16/2019     07/16/2019         Patient voiced understanding and agreement in the above discussion  Aware to contact our office with questions/symptoms in the interim

## 2019-08-02 ENCOUNTER — CLINICAL SUPPORT (OUTPATIENT)
Dept: FAMILY MEDICINE CLINIC | Facility: CLINIC | Age: 59
End: 2019-08-02
Payer: MEDICARE

## 2019-08-02 DIAGNOSIS — E53.8 VITAMIN B 12 DEFICIENCY: Primary | ICD-10-CM

## 2019-08-02 PROCEDURE — 96372 THER/PROPH/DIAG INJ SC/IM: CPT

## 2019-08-02 RX ORDER — CYANOCOBALAMIN 1000 UG/ML
1000 INJECTION INTRAMUSCULAR; SUBCUTANEOUS
Status: DISCONTINUED | OUTPATIENT
Start: 2019-08-02 | End: 2021-04-15

## 2019-08-02 RX ADMIN — CYANOCOBALAMIN 1000 MCG: 1000 INJECTION INTRAMUSCULAR; SUBCUTANEOUS at 13:11

## 2019-08-20 ENCOUNTER — OFFICE VISIT (OUTPATIENT)
Dept: FAMILY MEDICINE CLINIC | Facility: CLINIC | Age: 59
End: 2019-08-20
Payer: MEDICARE

## 2019-08-20 VITALS
WEIGHT: 250 LBS | HEIGHT: 64 IN | OXYGEN SATURATION: 96 % | RESPIRATION RATE: 16 BRPM | SYSTOLIC BLOOD PRESSURE: 140 MMHG | HEART RATE: 93 BPM | DIASTOLIC BLOOD PRESSURE: 100 MMHG | BODY MASS INDEX: 42.68 KG/M2 | TEMPERATURE: 98.3 F

## 2019-08-20 DIAGNOSIS — Z12.11 SCREENING FOR COLON CANCER: Primary | ICD-10-CM

## 2019-08-20 DIAGNOSIS — Z12.39 SCREENING FOR BREAST CANCER: ICD-10-CM

## 2019-08-20 DIAGNOSIS — D50.8 IRON DEFICIENCY ANEMIA SECONDARY TO INADEQUATE DIETARY IRON INTAKE: ICD-10-CM

## 2019-08-20 DIAGNOSIS — Z13.9 ENCOUNTER FOR HEALTH-RELATED SCREENING: ICD-10-CM

## 2019-08-20 DIAGNOSIS — B37.2 YEAST INFECTION OF THE SKIN: ICD-10-CM

## 2019-08-20 PROCEDURE — 99214 OFFICE O/P EST MOD 30 MIN: CPT | Performed by: NURSE PRACTITIONER

## 2019-08-20 RX ORDER — NYSTATIN 100000 U/G
CREAM TOPICAL 2 TIMES DAILY
Qty: 30 G | Refills: 0 | Status: SHIPPED | OUTPATIENT
Start: 2019-08-20 | End: 2020-01-23 | Stop reason: SDUPTHER

## 2019-08-20 NOTE — PROGRESS NOTES
Assessment/Plan:     Anemia  Has history of iron deficient anemia  Will do testing to assess status and treat as needed    Encounter for health-related screening  Will order additional testing including TSH, CMP, lipid panel and see back at completion  Yeast infection of the skin  Yeast has noted under both breasts with the right 1 being worse than the left and return if rash does not resolve, will treat with nystatin powder  She is instructed to keep the area clean and dry  Screening for colon cancer  Will order stool for occult blood for screening  Diagnoses and all orders for this visit:    Screening for colon cancer  -     Occult Blood, Fecal Immunochemical; Future    Screening for breast cancer  -     Mammo screening bilateral w cad; Future    Iron deficiency anemia secondary to inadequate dietary iron intake  -     Comprehensive metabolic panel; Future  -     Vitamin B12; Future  -     TIBC; Future  -     Ferritin; Future    Yeast infection of the skin  -     nystatin (MYCOSTATIN) cream; Apply topically 2 (two) times a day    Encounter for health-related screening  -     TSH, 3rd generation with Free T4 reflex; Future  -     Comprehensive metabolic panel; Future  -     Lipid Panel with Direct LDL reflex; Future          Subjective:     Patient ID: Leoncio Robledo is a 60 y o  female  HPI  presents today with a rash under her breasts  It started yesterday with burning and red  She put gauze to help with the moisture but it would stay  The rash worsened since yesterday  Review of Systems   Constitutional: Positive for fatigue  Respiratory: Positive for shortness of breath (exertional)  Cardiovascular: Negative  Genitourinary: Negative  Musculoskeletal: Negative  Skin: Positive for rash  Allergic/Immunologic: Negative  Neurological: Negative  Hematological: Bruises/bleeds easily  Psychiatric/Behavioral: Negative            Objective:     Physical Exam Constitutional: She is oriented to person, place, and time  She appears well-developed and well-nourished  HENT:   Head: Normocephalic and atraumatic  Right Ear: External ear normal    Left Ear: External ear normal    Nose: Nose normal    Eyes: Conjunctivae and EOM are normal    Neck: Normal range of motion  Neck supple  Cardiovascular: Normal rate, regular rhythm and normal heart sounds  Pulmonary/Chest: Effort normal and breath sounds normal    Abdominal: Soft  Bowel sounds are normal    Musculoskeletal: Normal range of motion  Neurological: She is alert and oriented to person, place, and time  She has normal reflexes  Skin: Skin is warm and dry  Rash noted  Psychiatric: She has a normal mood and affect   Her behavior is normal  Judgment and thought content normal

## 2019-08-23 PROBLEM — B37.2 YEAST INFECTION OF THE SKIN: Status: ACTIVE | Noted: 2019-08-23

## 2019-08-23 PROBLEM — Z12.39 SCREENING FOR BREAST CANCER: Status: ACTIVE | Noted: 2019-08-23

## 2019-08-23 PROBLEM — Z12.11 SCREENING FOR COLON CANCER: Status: ACTIVE | Noted: 2019-08-23

## 2019-08-23 NOTE — ASSESSMENT & PLAN NOTE
Yeast has noted under both breasts with the right 1 being worse than the left and return if rash does not resolve, will treat with nystatin powder  She is instructed to keep the area clean and dry

## 2019-08-28 ENCOUNTER — TELEPHONE (OUTPATIENT)
Dept: FAMILY MEDICINE CLINIC | Facility: CLINIC | Age: 59
End: 2019-08-28

## 2019-08-28 DIAGNOSIS — K21.9 GERD WITHOUT ESOPHAGITIS: ICD-10-CM

## 2019-08-28 RX ORDER — OMEPRAZOLE 20 MG/1
20 TABLET, DELAYED RELEASE ORAL 2 TIMES DAILY
Qty: 180 TABLET | Refills: 2 | Status: SHIPPED | OUTPATIENT
Start: 2019-08-28 | End: 2020-01-23

## 2019-08-28 NOTE — TELEPHONE ENCOUNTER
PC from patient, refill Omeprazole 20mg 1 tab twice daily #180 (pt states she requested this at her last visit & she has not had this med for 3 wks) Please call to [employer] [facility]

## 2019-09-03 ENCOUNTER — CLINICAL SUPPORT (OUTPATIENT)
Dept: FAMILY MEDICINE CLINIC | Facility: CLINIC | Age: 59
End: 2019-09-03
Payer: MEDICARE

## 2019-09-03 ENCOUNTER — HOSPITAL ENCOUNTER (OUTPATIENT)
Dept: RADIOLOGY | Facility: IMAGING CENTER | Age: 59
Discharge: HOME | End: 2019-09-03
Payer: MEDICARE

## 2019-09-03 VITALS — BODY MASS INDEX: 40.97 KG/M2 | WEIGHT: 240 LBS | HEIGHT: 64 IN

## 2019-09-03 DIAGNOSIS — Z12.39 SCREENING FOR BREAST CANCER: ICD-10-CM

## 2019-09-03 DIAGNOSIS — E53.9 VITAMIN B-COMPLEX DEFICIENCY: Primary | ICD-10-CM

## 2019-09-03 PROCEDURE — 77067 SCR MAMMO BI INCL CAD: CPT

## 2019-09-03 RX ORDER — OMEPRAZOLE 20 MG/1
20 TABLET, DELAYED RELEASE ORAL 2 TIMES DAILY
Qty: 60 TABLET | Refills: 3 | Status: SHIPPED | OUTPATIENT
Start: 2019-09-03 | End: 2020-03-17 | Stop reason: SDUPTHER

## 2019-09-03 RX ORDER — CYANOCOBALAMIN 1000 UG/ML
1000 INJECTION INTRAMUSCULAR; SUBCUTANEOUS
Status: DISCONTINUED | OUTPATIENT
Start: 2019-09-03 | End: 2021-04-15

## 2019-09-03 RX ADMIN — CYANOCOBALAMIN 1000 MCG: 1000 INJECTION INTRAMUSCULAR; SUBCUTANEOUS at 13:13

## 2019-09-09 DIAGNOSIS — Z88.9 H/O SEASONAL ALLERGIES: ICD-10-CM

## 2019-09-09 RX ORDER — LEVOCETIRIZINE DIHYDROCHLORIDE 5 MG/1
5 TABLET, FILM COATED ORAL EVERY EVENING
Qty: 30 TABLET | Refills: 3 | Status: SHIPPED | OUTPATIENT
Start: 2019-09-09 | End: 2019-09-10 | Stop reason: SDUPTHER

## 2019-09-09 NOTE — TELEPHONE ENCOUNTER
Pt requesting refill on Levocetrizine  sent to provider to approve  Pt also requesting mammogram results  Please advise  Thank you

## 2019-09-10 ENCOUNTER — OFFICE VISIT (OUTPATIENT)
Dept: FAMILY MEDICINE CLINIC | Facility: CLINIC | Age: 59
End: 2019-09-10
Payer: MEDICARE

## 2019-09-10 VITALS
RESPIRATION RATE: 16 BRPM | WEIGHT: 249 LBS | OXYGEN SATURATION: 95 % | TEMPERATURE: 98.3 F | HEART RATE: 76 BPM | DIASTOLIC BLOOD PRESSURE: 80 MMHG | SYSTOLIC BLOOD PRESSURE: 128 MMHG | BODY MASS INDEX: 42.51 KG/M2 | HEIGHT: 64 IN

## 2019-09-10 DIAGNOSIS — F31.61 BIPOLAR DISORDER, CURRENT EPISODE MIXED, MILD (HCC): ICD-10-CM

## 2019-09-10 DIAGNOSIS — N39.3 STRESS INCONTINENCE: ICD-10-CM

## 2019-09-10 DIAGNOSIS — Z88.9 H/O SEASONAL ALLERGIES: ICD-10-CM

## 2019-09-10 DIAGNOSIS — J06.9 ACUTE UPPER RESPIRATORY INFECTION: Primary | ICD-10-CM

## 2019-09-10 PROCEDURE — 99214 OFFICE O/P EST MOD 30 MIN: CPT | Performed by: PHYSICIAN ASSISTANT

## 2019-09-10 RX ORDER — LEVOCETIRIZINE DIHYDROCHLORIDE 5 MG/1
5 TABLET, FILM COATED ORAL EVERY EVENING
Qty: 30 TABLET | Refills: 3 | Status: SHIPPED | OUTPATIENT
Start: 2019-09-10 | End: 2020-01-06 | Stop reason: SDUPTHER

## 2019-09-10 RX ORDER — GABAPENTIN 800 MG/1
1600 TABLET ORAL
Refills: 0 | COMMUNITY
Start: 2019-08-28

## 2019-09-10 NOTE — PROGRESS NOTES
Assessment/Plan:    I did advise her that the dose of the Xyzal will not be increased at this time  I did recommend she purchase over-the-counter generic Sudafed which is behind the pharmacy counter and utilized at the package directs  Nasal decongestant spray as needed as package directs maximum 3-5 days  Increase clear liquids continued  Follow-up if there is no improvement over the next week or any symptoms increase    Phone number given for urogynecology for stress incontinence  Advised to proceed with a urine sample in the lab since she was not able to give a sample at this time  She states she will have this completed when she goes for her blood work that was also ordered by Kodi Godfrey  Continue follow-up with Psychiatry as scheduled         Diagnoses and all orders for this visit:    Acute upper respiratory infection    Stress incontinence  -     Ambulatory referral to Urogynecology; Future  -     UA w Reflex to Microscopic w Reflex to Culture -Lab Collect    Bipolar disorder, current episode mixed, mild (HCC)    H/O seasonal allergies  Comments:  Refilled Xyzal  Orders:  -     levocetirizine (XYZAL) 5 MG tablet; Take 1 tablet (5 mg total) by mouth every evening    Other orders  -     gabapentin (NEURONTIN) 800 mg tablet          Subjective:      Patient ID: Jet Solis is a 60 y o  female  Patient presents today for evaluation of upper respiratory symptoms she has had over the past week  She denies any fever, chills, ear pain, throat pain  She does notice a postnasal drip  Her voice has been hoarse  She just got a generic type of Sudafed over-the-counter that only cost 1 dollar but unsure the name and ingredients  Noticed minimal relief with this medication  She denies any pulmonary problems  She has had an intermittent cough  Intermittent nasal congestion  She also states that she has had stress incontinence for quite some time    She states recently she had an episode where she lost control of her urine because she could not get to the bathroom quick enough  She denies any dysuria  She does have increased frequency which is not unusual for her because she drinks a lot a water throughout the day  She states that she had a bladder procedure done years ago but she can't remember why  She would also like a refill of her Xyzal for her allergies  She was wondering if the dose could be increased  She does follow with her psychiatrist for bipolar disorder  She states that she has been doing really well on her current recommended medications by the specialist   She sees the psychiatrist every 6 months  She does have an upcoming appointment next week  The following portions of the patient's history were reviewed and updated as appropriate:   She  has a past medical history of Bipolar affective disorder, depressed ([facility] [address] ), Heart murmur, Osteoarthritis, Psychiatric disorder, and Vitamin B12 deficiency    She   Patient Active Problem List    Diagnosis Date Noted    Acute upper respiratory infection 09/10/2019    Stress incontinence 09/10/2019    Screening for breast cancer 08/23/2019    Screening for colon cancer 08/23/2019    Yeast infection of the skin 08/23/2019    Encounter for health-related screening 08/20/2019    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    Iron deficiency anemia secondary to inadequate dietary iron intake 05/17/2019    Dermatitis 05/06/2019    Pre-op examination 05/06/2019    Chronic pain of left knee 05/06/2019    Obesity 12/28/2018    Cervical stenosis of spinal canal 12/13/2018    SAH (subarachnoid hemorrhage) ([facility] ) 06/25/2017    Bipolar 2 disorder ([facility] ) 12/02/2015    Anxiety state 04/07/2015    Anemia 01/14/2013    Allergy 10/03/2012    Arthropathy 10/03/2012    Neck pain 10/03/2012    Depression 10/03/2012    Gastroesophageal reflux disease 10/03/2012    Insomnia 10/03/2012    Vitamin B-complex deficiency 10/03/2012     She  has a past surgical history that includes Knee surgery (Right); Cervical spine surgery (2002); Gastric bypass (2004); Foot fracture surgery (Right, 2012); Foot surgery (Left); Cholecystectomy (05/1985); Hysterectomy; and Breast lumpectomy (Right)  Her family history includes Breast cancer in her maternal grandfather, maternal grandmother, paternal aunt, paternal aunt, paternal grandfather, and paternal grandmother; Breast cancer (age of onset: 77) in her half-sister; Coronary artery disease in her family; Diabetes in her father; Heart attack in her family; Heart disease in her father  She  reports that she has never smoked  She has never used smokeless tobacco  She reports that she drinks alcohol  She reports that she does not use drugs    Current Outpatient Medications   Medication Sig Dispense Refill    acetaminophen (TYLENOL) 650 mg CR tablet 1 as directed for pain      Azelastine-Fluticasone 137-50 MCG/ACT SUSP Every 12 hours      Buprenorphine (BUTRANS) 5 MCG/HR PTWK apply 1 patch by transdermal route  every 7 days      Buprenorphine 10 MCG/HR PTWK APPLY 1 PATCH ON THE SKIN WEEKLY  0    BUPROPION HCL ER, XL, PO every 24 hours        clotrimazole-betamethasone (LOTRISONE) 1-0 05 % cream Apply topically 2 (two) times a day 30 g 0    DULoxetine (CYMBALTA) 60 mg delayed release capsule every 12 (twelve) hours      gabapentin (NEURONTIN) 800 mg tablet   0    GABAPENTIN PO Take by mouth      ibuprofen (MOTRIN) 200 mg tablet Reported on 7/10/2017       levocetirizine (XYZAL) 5 MG tablet Take 1 tablet (5 mg total) by mouth every evening 30 tablet 3    nystatin (MYCOSTATIN) cream Apply topically 2 (two) times a day 30 g 0    omeprazole (PRILOSEC OTC) 20 MG tablet Take 1 tablet (20 mg total) by mouth 2 (two) times a day 60 tablet 3    omeprazole (PRILOSEC OTC) 20 MG tablet Take 1 tablet (20 mg total) by mouth 2 (two) times a day 180 tablet 2    QUEtiapine Fumarate (SEROQUEL PO) Take by mouth      tiZANidine (ZANAFLEX) 4 mg tablet Take 4 mg by mouth 3 (three) times a day  0     Current Facility-Administered Medications   Medication Dose Route Frequency Provider Last Rate Last Dose    cyanocobalamin injection 1,000 mcg  1,000 mcg Intramuscular Q30 Days Xiomara Becker MD   1,000 mcg at 09/20/18 1527    cyanocobalamin injection 1,000 mcg  1,000 mcg Intramuscular Q30 Days Xiomara Becker MD   1,000 mcg at 11/06/18 0853    cyanocobalamin injection 1,000 mcg  1,000 mcg Intramuscular Q30 Days Xiomara Becker MD   1,000 mcg at 12/06/18 1631    cyanocobalamin injection 1,000 mcg  1,000 mcg Intramuscular Q30 Days Ami Schwarz MD   1,000 mcg at 01/07/19 1443    cyanocobalamin injection 1,000 mcg  1,000 mcg Intramuscular Q30 Days Ami Schwarz MD   1,000 mcg at 03/08/19 1501    cyanocobalamin injection 1,000 mcg  1,000 mcg Intramuscular Q30 Days WHITNEY Hwang   1,000 mcg at 03/11/19 1644    cyanocobalamin injection 1,000 mcg  1,000 mcg Intramuscular Q30 Days Ami Schwarz MD   1,000 mcg at 04/08/19 1317    cyanocobalamin injection 1,000 mcg  1,000 mcg Intramuscular Q30 Days Ami Schwarz MD   1,000 mcg at 07/01/19 1529    cyanocobalamin injection 1,000 mcg  1,000 mcg Intramuscular Q30 Days Ami Schwarz MD   1,000 mcg at 08/02/19 1311    cyanocobalamin injection 1,000 mcg  1,000 mcg Intramuscular Q30 Days Ami Schwarz MD   1,000 mcg at 09/03/19 1313     Current Outpatient Medications on File Prior to Visit   Medication Sig    acetaminophen (TYLENOL) 650 mg CR tablet 1 as directed for pain    Azelastine-Fluticasone 137-50 MCG/ACT SUSP Every 12 hours    Buprenorphine (BUTRANS) 5 MCG/HR PTWK apply 1 patch by transdermal route  every 7 days    Buprenorphine 10 MCG/HR PTWK APPLY 1 PATCH ON THE SKIN WEEKLY    BUPROPION HCL ER, XL, PO every 24 hours      clotrimazole-betamethasone (LOTRISONE) 1-0 05 % cream Apply topically 2 (two) times a day    DULoxetine (CYMBALTA) 60 mg delayed release capsule every 12 (twelve) hours    gabapentin (NEURONTIN) 800 mg tablet     GABAPENTIN PO Take by mouth    ibuprofen (MOTRIN) 200 mg tablet Reported on 7/10/2017     nystatin (MYCOSTATIN) cream Apply topically 2 (two) times a day    omeprazole (PRILOSEC OTC) 20 MG tablet Take 1 tablet (20 mg total) by mouth 2 (two) times a day    omeprazole (PRILOSEC OTC) 20 MG tablet Take 1 tablet (20 mg total) by mouth 2 (two) times a day    QUEtiapine Fumarate (SEROQUEL PO) Take by mouth    tiZANidine (ZANAFLEX) 4 mg tablet Take 4 mg by mouth 3 (three) times a day    [DISCONTINUED] levocetirizine (XYZAL) 5 MG tablet Take 1 tablet (5 mg total) by mouth every evening     Current Facility-Administered Medications on File Prior to Visit   Medication    cyanocobalamin injection 1,000 mcg    cyanocobalamin injection 1,000 mcg    cyanocobalamin injection 1,000 mcg    cyanocobalamin injection 1,000 mcg    cyanocobalamin injection 1,000 mcg    cyanocobalamin injection 1,000 mcg    cyanocobalamin injection 1,000 mcg    cyanocobalamin injection 1,000 mcg    cyanocobalamin injection 1,000 mcg    cyanocobalamin injection 1,000 mcg     She is allergic to prochlorperazine; rofecoxib; and erythromycin base       Review of Systems   Constitutional: Negative for chills and fever  HENT: Positive for congestion, postnasal drip, rhinorrhea and sinus pressure  Negative for ear pain and sore throat  Respiratory: Positive for cough  Genitourinary:        As stated in HPI   Psychiatric/Behavioral:        As stated in HPI         Objective:      /80 (BP Location: Left arm, Patient Position: Sitting, Cuff Size: Large)   Pulse 76   Temp 98 3 °F (36 8 °C)   Resp 16   Ht 5' 4" (1 626 m)   Wt 113 kg (249 lb)   SpO2 95%   BMI 42 74 kg/m²          Physical Exam   Constitutional: She appears well-developed and well-nourished  No distress  HENT:   Head: Normocephalic and atraumatic     Right Ear: External ear normal    Left Ear: External ear normal    Mouth/Throat: Oropharynx is clear and moist  No oropharyngeal exudate  Neck: Normal range of motion  Neck supple  No thyromegaly present  Cardiovascular: Normal rate, regular rhythm and normal heart sounds  Exam reveals no gallop and no friction rub  No murmur heard  Pulmonary/Chest: Effort normal and breath sounds normal  No respiratory distress  She has no wheezes  She has no rales  Musculoskeletal: She exhibits no deformity  Lymphadenopathy:     She has no cervical adenopathy  Neurological: She is alert  Skin: Skin is warm  Psychiatric: She has a normal mood and affect

## 2019-10-07 ENCOUNTER — TRANSCRIBE ORDERS (OUTPATIENT)
Dept: ADMINISTRATIVE | Facility: HOSPITAL | Age: 59
End: 2019-10-07

## 2019-10-07 ENCOUNTER — CLINICAL SUPPORT (OUTPATIENT)
Dept: FAMILY MEDICINE CLINIC | Facility: CLINIC | Age: 59
End: 2019-10-07
Payer: MEDICARE

## 2019-10-07 DIAGNOSIS — E53.9 VITAMIN B-COMPLEX DEFICIENCY: Primary | ICD-10-CM

## 2019-10-07 PROCEDURE — 99214 OFFICE O/P EST MOD 30 MIN: CPT

## 2019-10-07 RX ORDER — CYANOCOBALAMIN 1000 UG/ML
1000 INJECTION INTRAMUSCULAR; SUBCUTANEOUS
Status: DISCONTINUED | OUTPATIENT
Start: 2019-10-07 | End: 2021-04-15

## 2019-10-07 RX ADMIN — CYANOCOBALAMIN 1000 MCG: 1000 INJECTION INTRAMUSCULAR; SUBCUTANEOUS at 14:14

## 2019-11-21 ENCOUNTER — CLINICAL SUPPORT (OUTPATIENT)
Dept: FAMILY MEDICINE CLINIC | Facility: CLINIC | Age: 59
End: 2019-11-21
Payer: MEDICARE

## 2019-11-21 DIAGNOSIS — E53.9 VITAMIN B-COMPLEX DEFICIENCY: Primary | ICD-10-CM

## 2019-11-21 DIAGNOSIS — Z23 IMMUNIZATION DUE: ICD-10-CM

## 2019-11-21 PROCEDURE — G0008 ADMIN INFLUENZA VIRUS VAC: HCPCS

## 2019-11-21 PROCEDURE — 90682 RIV4 VACC RECOMBINANT DNA IM: CPT

## 2019-11-21 RX ORDER — CYANOCOBALAMIN 1000 UG/ML
1000 INJECTION INTRAMUSCULAR; SUBCUTANEOUS
Status: DISCONTINUED | OUTPATIENT
Start: 2019-11-21 | End: 2021-04-15

## 2019-11-21 RX ADMIN — CYANOCOBALAMIN 1000 MCG: 1000 INJECTION INTRAMUSCULAR; SUBCUTANEOUS at 12:32

## 2019-11-21 NOTE — PROGRESS NOTES
Pt arrived in office for flu vaccine and cyanocobalamin injection  Pt requested both injections to be given left deltoid  Pt given immunization  And B12 injection without issue

## 2019-12-09 ENCOUNTER — HOSPITAL ENCOUNTER (OUTPATIENT)
Dept: INFUSION CENTER | Facility: CLINIC | Age: 59
Discharge: HOME | End: 2019-12-09
Payer: MEDICARE

## 2019-12-09 VITALS
SYSTOLIC BLOOD PRESSURE: 124 MMHG | DIASTOLIC BLOOD PRESSURE: 87 MMHG | TEMPERATURE: 97.9 F | RESPIRATION RATE: 18 BRPM | HEART RATE: 91 BPM

## 2019-12-09 DIAGNOSIS — D50.8 IRON DEFICIENCY ANEMIA SECONDARY TO INADEQUATE DIETARY IRON INTAKE: Primary | ICD-10-CM

## 2019-12-09 DIAGNOSIS — Z90.3 POSTGASTRECTOMY MALABSORPTION: ICD-10-CM

## 2019-12-09 DIAGNOSIS — K91.2 POSTGASTRECTOMY MALABSORPTION: ICD-10-CM

## 2019-12-09 PROCEDURE — 96365 THER/PROPH/DIAG IV INF INIT: CPT

## 2019-12-09 RX ORDER — SODIUM CHLORIDE 9 MG/ML
20 INJECTION, SOLUTION INTRAVENOUS ONCE
Status: COMPLETED | OUTPATIENT
Start: 2019-12-09 | End: 2019-12-09

## 2019-12-09 RX ORDER — SODIUM CHLORIDE 9 MG/ML
20 INJECTION, SOLUTION INTRAVENOUS ONCE
Status: CANCELLED | OUTPATIENT
Start: 2020-06-09

## 2019-12-09 RX ADMIN — SODIUM CHLORIDE 20 ML/HR: 0.9 INJECTION, SOLUTION INTRAVENOUS at 13:28

## 2019-12-09 RX ADMIN — IRON SUCROSE 200 MG: 20 INJECTION, SOLUTION INTRAVENOUS at 13:26

## 2019-12-09 NOTE — PLAN OF CARE

## 2019-12-23 ENCOUNTER — CLINICAL SUPPORT (OUTPATIENT)
Dept: FAMILY MEDICINE CLINIC | Facility: CLINIC | Age: 59
End: 2019-12-23
Payer: MEDICARE

## 2019-12-23 DIAGNOSIS — E53.8 VITAMIN B 12 DEFICIENCY: Primary | ICD-10-CM

## 2019-12-23 PROCEDURE — 96372 THER/PROPH/DIAG INJ SC/IM: CPT

## 2019-12-23 RX ORDER — CYANOCOBALAMIN 1000 UG/ML
1000 INJECTION INTRAMUSCULAR; SUBCUTANEOUS
Status: DISCONTINUED | OUTPATIENT
Start: 2019-12-23 | End: 2021-04-15

## 2019-12-23 RX ADMIN — CYANOCOBALAMIN 1000 MCG: 1000 INJECTION INTRAMUSCULAR; SUBCUTANEOUS at 12:14

## 2020-01-03 ENCOUNTER — TELEPHONE (OUTPATIENT)
Dept: HEMATOLOGY ONCOLOGY | Facility: CLINIC | Age: 60
End: 2020-01-03

## 2020-01-03 DIAGNOSIS — D50.8 IRON DEFICIENCY ANEMIA SECONDARY TO INADEQUATE DIETARY IRON INTAKE: Primary | ICD-10-CM

## 2020-01-03 NOTE — TELEPHONE ENCOUNTER
Reyna Suh called to make sure Betsey Gottron is cleared for left knee replacement surgery and if she possibly needs to be seen before   Please call her back at [phone]

## 2020-01-03 NOTE — TELEPHONE ENCOUNTER
[facility] for [facility] informing patient that she needs a CBC w/ diff completed along with a ferritin panel  Orders are in system  I will mail out script orders to her house as well  I called Feliberto Lesches back at [place] [address]  to inform her of this information, and stated that per Dr Nahun Melgar he states that preopertively patient should have PT PTT and BMP testing completed  I relayed this information to Feliberto Lesches and she asked if I could fax this information in a letter so they have it  She gave me her fax [phone], I will complete letter and fax over as requested

## 2020-01-06 ENCOUNTER — TELEPHONE (OUTPATIENT)
Dept: HEMATOLOGY ONCOLOGY | Facility: CLINIC | Age: 60
End: 2020-01-06

## 2020-01-06 DIAGNOSIS — Z88.9 H/O SEASONAL ALLERGIES: ICD-10-CM

## 2020-01-06 RX ORDER — LEVOCETIRIZINE DIHYDROCHLORIDE 5 MG/1
5 TABLET, FILM COATED ORAL EVERY EVENING
Qty: 30 TABLET | Refills: 3 | Status: SHIPPED | OUTPATIENT
Start: 2020-01-06 | End: 2020-03-17 | Stop reason: SDUPTHER

## 2020-01-06 NOTE — TELEPHONE ENCOUNTER
Patient called in and stated have a knee surgery scheduled for January 30 and she needs a clearance  from Dr Ld Fonseca  Also patient asked if it would be necessary to make an appointment  Please call and advise

## 2020-01-06 NOTE — TELEPHONE ENCOUNTER
Please let her know that I did refill the medication in September when I saw her last with 3 refills which would of covered her until now    A new prescription has been sent to the pharmacy

## 2020-01-06 NOTE — TELEPHONE ENCOUNTER
Spoke to Dyana Sarkar stating that per Dr Milagros Hansen she will need a cbc w/ diff and a ferriton completed in order to [facility]  Hematology clearance for her knee replacement  Orders are in system  Patient voiced understanding

## 2020-01-15 ENCOUNTER — APPOINTMENT (OUTPATIENT)
Dept: LAB | Facility: CLINIC | Age: 60
End: 2020-01-15
Payer: MEDICARE

## 2020-01-15 DIAGNOSIS — D50.8 IRON DEFICIENCY ANEMIA SECONDARY TO INADEQUATE DIETARY IRON INTAKE: ICD-10-CM

## 2020-01-15 LAB
BASOPHILS # BLD AUTO: 0.03 THOUSANDS/ΜL (ref 0–0.1)
BASOPHILS NFR BLD AUTO: 1 % (ref 0–1)
EOSINOPHIL # BLD AUTO: 0.08 THOUSAND/ΜL (ref 0–0.61)
EOSINOPHIL NFR BLD AUTO: 1 % (ref 0–6)
ERYTHROCYTE [DISTWIDTH] IN BLOOD BY AUTOMATED COUNT: 13.7 % (ref 11.6–15.1)
FERRITIN SERPL-MCNC: 131 NG/ML (ref 8–388)
HCT VFR BLD AUTO: 39.6 % (ref 34.8–46.1)
HGB BLD-MCNC: 12.7 G/DL (ref 11.5–15.4)
IMM GRANULOCYTES # BLD AUTO: 0.01 THOUSAND/UL (ref 0–0.2)
IMM GRANULOCYTES NFR BLD AUTO: 0 % (ref 0–2)
LYMPHOCYTES # BLD AUTO: 3.04 THOUSANDS/ΜL (ref 0.6–4.47)
LYMPHOCYTES NFR BLD AUTO: 55 % (ref 14–44)
MCH RBC QN AUTO: 29.3 PG (ref 26.8–34.3)
MCHC RBC AUTO-ENTMCNC: 32.1 G/DL (ref 31.4–37.4)
MCV RBC AUTO: 92 FL (ref 82–98)
MONOCYTES # BLD AUTO: 0.59 THOUSAND/ΜL (ref 0.17–1.22)
MONOCYTES NFR BLD AUTO: 11 % (ref 4–12)
NEUTROPHILS # BLD AUTO: 1.77 THOUSANDS/ΜL (ref 1.85–7.62)
NEUTS SEG NFR BLD AUTO: 32 % (ref 43–75)
NRBC BLD AUTO-RTO: 0 /100 WBCS
PLATELET # BLD AUTO: 310 THOUSANDS/UL (ref 149–390)
PMV BLD AUTO: 9.9 FL (ref 8.9–12.7)
RBC # BLD AUTO: 4.33 MILLION/UL (ref 3.81–5.12)
WBC # BLD AUTO: 5.52 THOUSAND/UL (ref 4.31–10.16)

## 2020-01-15 PROCEDURE — 82728 ASSAY OF FERRITIN: CPT

## 2020-01-15 PROCEDURE — 36415 COLL VENOUS BLD VENIPUNCTURE: CPT

## 2020-01-15 PROCEDURE — 85025 COMPLETE CBC W/AUTO DIFF WBC: CPT

## 2020-01-16 ENCOUNTER — TELEPHONE (OUTPATIENT)
Dept: HEMATOLOGY ONCOLOGY | Facility: CLINIC | Age: 60
End: 2020-01-16

## 2020-01-16 NOTE — TELEPHONE ENCOUNTER
Spoke to Marija as per Dr Pratibha Bajwa she will have a hematologic clearance from our office pending the results of her PT & PTT testing come back normal  A note was sent over to her Dr Reggie John stating that will need to be completed pre-operatively  Marija provided me with fax # [phone] to fax clearance note  I stated I will type note and send it either today or first thing tomorrow morning  Patient voiced understanding

## 2020-01-16 NOTE — TELEPHONE ENCOUNTER
Patient went for labs last Wednesday before her knew surgery and she was told by Ernesto Prieto to call for results   Please call her back at [phone]

## 2020-01-17 NOTE — TELEPHONE ENCOUNTER
Faxed clearance letter over to Dr Peggy Jane office at [place] [address]  with fax # provided by patient   [phone]

## 2020-01-22 ENCOUNTER — APPOINTMENT (OUTPATIENT)
Dept: LAB | Facility: CLINIC | Age: 60
End: 2020-01-22
Payer: MEDICARE

## 2020-01-22 DIAGNOSIS — Z13.9 ENCOUNTER FOR HEALTH-RELATED SCREENING: ICD-10-CM

## 2020-01-22 DIAGNOSIS — Z12.11 SCREENING FOR COLON CANCER: ICD-10-CM

## 2020-01-22 DIAGNOSIS — Z90.3 POSTGASTRECTOMY MALABSORPTION: ICD-10-CM

## 2020-01-22 DIAGNOSIS — D50.8 IRON DEFICIENCY ANEMIA SECONDARY TO INADEQUATE DIETARY IRON INTAKE: ICD-10-CM

## 2020-01-22 DIAGNOSIS — K91.2 POSTGASTRECTOMY MALABSORPTION: ICD-10-CM

## 2020-01-22 PROBLEM — Z86.79 HISTORY OF SUBARACHNOID HEMORRHAGE: Status: ACTIVE | Noted: 2020-01-22

## 2020-01-22 PROBLEM — M17.12 LOCALIZED OSTEOARTHRITIS OF LEFT KNEE: Status: ACTIVE | Noted: 2020-01-22

## 2020-01-22 PROBLEM — M47.812 CERVICAL SPONDYLOSIS: Status: ACTIVE | Noted: 2019-01-09

## 2020-01-22 PROBLEM — J06.9 ACUTE UPPER RESPIRATORY INFECTION: Status: RESOLVED | Noted: 2019-09-10 | Resolved: 2020-01-22

## 2020-01-22 PROBLEM — I60.9 SAH (SUBARACHNOID HEMORRHAGE) (HCC): Status: RESOLVED | Noted: 2017-06-25 | Resolved: 2020-01-22

## 2020-01-22 LAB
ALBUMIN SERPL BCP-MCNC: 3.4 G/DL (ref 3.5–5)
ALP SERPL-CCNC: 135 U/L (ref 46–116)
ALT SERPL W P-5'-P-CCNC: 20 U/L (ref 12–78)
ANION GAP SERPL CALCULATED.3IONS-SCNC: 4 MMOL/L (ref 4–13)
AST SERPL W P-5'-P-CCNC: 21 U/L (ref 5–45)
BASOPHILS # BLD AUTO: 0.05 THOUSANDS/ΜL (ref 0–0.1)
BASOPHILS NFR BLD AUTO: 1 % (ref 0–1)
BILIRUB SERPL-MCNC: 0.5 MG/DL (ref 0.2–1)
BILIRUB UR QL STRIP: NEGATIVE
BUN SERPL-MCNC: 4 MG/DL (ref 5–25)
CALCIUM SERPL-MCNC: 8.8 MG/DL (ref 8.3–10.1)
CHLORIDE SERPL-SCNC: 99 MMOL/L (ref 100–108)
CHOLEST SERPL-MCNC: 205 MG/DL (ref 50–200)
CLARITY UR: CLEAR
CO2 SERPL-SCNC: 26 MMOL/L (ref 21–32)
COLOR UR: YELLOW
CREAT SERPL-MCNC: 0.84 MG/DL (ref 0.6–1.3)
EOSINOPHIL # BLD AUTO: 0.07 THOUSAND/ΜL (ref 0–0.61)
EOSINOPHIL NFR BLD AUTO: 1 % (ref 0–6)
ERYTHROCYTE [DISTWIDTH] IN BLOOD BY AUTOMATED COUNT: 13.6 % (ref 11.6–15.1)
FERRITIN SERPL-MCNC: 123 NG/ML (ref 8–388)
GFR SERPL CREATININE-BSD FRML MDRD: 76 ML/MIN/1.73SQ M
GLUCOSE P FAST SERPL-MCNC: 91 MG/DL (ref 65–99)
GLUCOSE UR STRIP-MCNC: NEGATIVE MG/DL
HCT VFR BLD AUTO: 38.1 % (ref 34.8–46.1)
HDLC SERPL-MCNC: 61 MG/DL
HGB BLD-MCNC: 12.2 G/DL (ref 11.5–15.4)
HGB UR QL STRIP.AUTO: NEGATIVE
IMM GRANULOCYTES # BLD AUTO: 0.01 THOUSAND/UL (ref 0–0.2)
IMM GRANULOCYTES NFR BLD AUTO: 0 % (ref 0–2)
IRON SATN MFR SERPL: 29 %
IRON SERPL-MCNC: 92 UG/DL (ref 50–170)
KETONES UR STRIP-MCNC: NEGATIVE MG/DL
LDLC SERPL CALC-MCNC: 121 MG/DL (ref 0–100)
LEUKOCYTE ESTERASE UR QL STRIP: NEGATIVE
LYMPHOCYTES # BLD AUTO: 2.51 THOUSANDS/ΜL (ref 0.6–4.47)
LYMPHOCYTES NFR BLD AUTO: 51 % (ref 14–44)
MCH RBC QN AUTO: 29 PG (ref 26.8–34.3)
MCHC RBC AUTO-ENTMCNC: 32 G/DL (ref 31.4–37.4)
MCV RBC AUTO: 91 FL (ref 82–98)
MONOCYTES # BLD AUTO: 0.47 THOUSAND/ΜL (ref 0.17–1.22)
MONOCYTES NFR BLD AUTO: 9 % (ref 4–12)
NEUTROPHILS # BLD AUTO: 1.9 THOUSANDS/ΜL (ref 1.85–7.62)
NEUTS SEG NFR BLD AUTO: 38 % (ref 43–75)
NITRITE UR QL STRIP: NEGATIVE
NRBC BLD AUTO-RTO: 0 /100 WBCS
PH UR STRIP.AUTO: 6.5 [PH]
PLATELET # BLD AUTO: 275 THOUSANDS/UL (ref 149–390)
PMV BLD AUTO: 9.4 FL (ref 8.9–12.7)
POTASSIUM SERPL-SCNC: 4 MMOL/L (ref 3.5–5.3)
PROT SERPL-MCNC: 6.6 G/DL (ref 6.4–8.2)
PROT UR STRIP-MCNC: NEGATIVE MG/DL
RBC # BLD AUTO: 4.2 MILLION/UL (ref 3.81–5.12)
SODIUM SERPL-SCNC: 129 MMOL/L (ref 136–145)
SP GR UR STRIP.AUTO: 1 (ref 1–1.03)
TIBC SERPL-MCNC: 316 UG/DL (ref 250–450)
TRIGL SERPL-MCNC: 114 MG/DL
TSH SERPL DL<=0.05 MIU/L-ACNC: 1.49 UIU/ML (ref 0.36–3.74)
UROBILINOGEN UR QL STRIP.AUTO: 0.2 E.U./DL
VIT B12 SERPL-MCNC: 381 PG/ML (ref 100–900)
WBC # BLD AUTO: 5.01 THOUSAND/UL (ref 4.31–10.16)

## 2020-01-22 PROCEDURE — 36415 COLL VENOUS BLD VENIPUNCTURE: CPT | Performed by: PHYSICIAN ASSISTANT

## 2020-01-22 PROCEDURE — 83550 IRON BINDING TEST: CPT

## 2020-01-22 PROCEDURE — 83540 ASSAY OF IRON: CPT

## 2020-01-22 PROCEDURE — 85025 COMPLETE CBC W/AUTO DIFF WBC: CPT | Performed by: PHYSICIAN ASSISTANT

## 2020-01-22 PROCEDURE — 80061 LIPID PANEL: CPT

## 2020-01-22 PROCEDURE — 82607 VITAMIN B-12: CPT

## 2020-01-22 PROCEDURE — 80053 COMPREHEN METABOLIC PANEL: CPT

## 2020-01-22 PROCEDURE — 81003 URINALYSIS AUTO W/O SCOPE: CPT | Performed by: PHYSICIAN ASSISTANT

## 2020-01-22 PROCEDURE — 82728 ASSAY OF FERRITIN: CPT

## 2020-01-22 PROCEDURE — 84443 ASSAY THYROID STIM HORMONE: CPT

## 2020-01-22 RX ORDER — HYDROXYZINE 50 MG/1
50 TABLET, FILM COATED ORAL
Refills: 0 | COMMUNITY
Start: 2019-12-18

## 2020-01-22 RX ORDER — QUETIAPINE FUMARATE 400 MG/1
800 TABLET, FILM COATED ORAL
Refills: 0 | COMMUNITY
Start: 2019-12-06

## 2020-01-22 RX ORDER — BUPRENORPHINE 15 UG/H
PATCH TRANSDERMAL
COMMUNITY
Start: 2020-01-14 | End: 2020-03-17

## 2020-01-23 ENCOUNTER — CONSULT (OUTPATIENT)
Dept: FAMILY MEDICINE CLINIC | Facility: CLINIC | Age: 60
End: 2020-01-23
Payer: MEDICARE

## 2020-01-23 VITALS
BODY MASS INDEX: 42.94 KG/M2 | SYSTOLIC BLOOD PRESSURE: 126 MMHG | HEIGHT: 64 IN | HEART RATE: 98 BPM | WEIGHT: 251.5 LBS | OXYGEN SATURATION: 92 % | TEMPERATURE: 98 F | DIASTOLIC BLOOD PRESSURE: 82 MMHG | RESPIRATION RATE: 16 BRPM

## 2020-01-23 DIAGNOSIS — B37.2 YEAST INFECTION OF THE SKIN: ICD-10-CM

## 2020-01-23 DIAGNOSIS — D50.8 IRON DEFICIENCY ANEMIA SECONDARY TO INADEQUATE DIETARY IRON INTAKE: ICD-10-CM

## 2020-01-23 DIAGNOSIS — M17.12 LOCALIZED OSTEOARTHRITIS OF LEFT KNEE: ICD-10-CM

## 2020-01-23 DIAGNOSIS — F31.61 BIPOLAR DISORDER, CURRENT EPISODE MIXED, MILD (HCC): ICD-10-CM

## 2020-01-23 DIAGNOSIS — E53.8 VITAMIN B 12 DEFICIENCY: ICD-10-CM

## 2020-01-23 DIAGNOSIS — K21.9 GASTROESOPHAGEAL REFLUX DISEASE, ESOPHAGITIS PRESENCE NOT SPECIFIED: ICD-10-CM

## 2020-01-23 DIAGNOSIS — Z01.818 PRE-OP EXAMINATION: Primary | ICD-10-CM

## 2020-01-23 PROBLEM — E66.9 OBESITY: Status: RESOLVED | Noted: 2018-12-28 | Resolved: 2020-01-23

## 2020-01-23 PROBLEM — J30.1 SEASONAL ALLERGIC RHINITIS DUE TO POLLEN: Status: ACTIVE | Noted: 2020-01-23

## 2020-01-23 PROCEDURE — 96372 THER/PROPH/DIAG INJ SC/IM: CPT | Performed by: PHYSICIAN ASSISTANT

## 2020-01-23 PROCEDURE — 99214 OFFICE O/P EST MOD 30 MIN: CPT | Performed by: PHYSICIAN ASSISTANT

## 2020-01-23 RX ORDER — NYSTATIN 100000 U/G
CREAM TOPICAL 2 TIMES DAILY
Qty: 30 G | Refills: 2 | Status: SHIPPED | OUTPATIENT
Start: 2020-01-23 | End: 2020-03-17 | Stop reason: SDUPTHER

## 2020-01-23 RX ORDER — CYANOCOBALAMIN 1000 UG/ML
1000 INJECTION INTRAMUSCULAR; SUBCUTANEOUS
Status: DISCONTINUED | OUTPATIENT
Start: 2020-01-23 | End: 2021-04-15

## 2020-01-23 RX ADMIN — CYANOCOBALAMIN 1000 MCG: 1000 INJECTION INTRAMUSCULAR; SUBCUTANEOUS at 13:53

## 2020-01-23 NOTE — PROGRESS NOTES
Assessment/Plan:    Patient is an acceptable risk for the proposed procedure  Gilda has the paperwork    Patient has been cleared by Dr Mcgee/hematology for her anemia  She is aware to discontinue her ibuprofen 1 week prior to the procedure  EKG and lab results have been reviewed from [facility] in are good  Patient has been advised to follow up here several weeks after the surgery  She will have her medical wellness at that time too    BMI Counseling: Body mass index is 43 17 kg/m²  The BMI is above normal  Nutrition recommendations include reducing portion sizes and decreasing overall calorie intake  Exercise recommendations include exercising 3-5 times per week  Diagnoses and all orders for this visit:    Pre-op examination    Localized osteoarthritis of left knee    Yeast infection of the skin  -     nystatin (MYCOSTATIN) cream; Apply topically 2 (two) times a day    Iron deficiency anemia secondary to inadequate dietary iron intake    Gastroesophageal reflux disease, esophagitis presence not specified    Bipolar disorder, current episode mixed, mild (HCC)    Other orders  -     Buprenorphine 15 MCG/HR PTWK  -     hydrOXYzine HCL (ATARAX) 50 mg tablet; Take 50 mg by mouth daily at bedtime  -     QUEtiapine (SEROquel) 400 MG tablet; Take 800 mg by mouth daily at bedtime          Subjective:      Patient ID: Grant Oconnor is a 60 y o  female  Pre-Op Visit (Brief): The patient is being seen for a preoperative visit  The procedure is left total knee replacement with Dr Jean Velázquez at [facility] on 01/30/2020  The indication for surgery is left knee pain      Surgical Risk Assessment:   Prior Anesthesia: Yes   Prior adverse reaction to general anesthesia:Yes vomitting post anesthesia      Pertinent Past Medical History  Neck osteoarthrosis: Yes   Seizure disorder:No  Asthma:No  Angina:No  Arrhythmia:No  CAD:No  CAD without prior MI:No  CAD without recent PCI:No  CHF:No  Chronic liver disease:No  Acute hepatitis::no  Coagulation delay:No  Primary hypercoagulable state:No  Secondary hypercoagulable state:No  pulmonary embolism:No  DVT:No  Use anticoagulants:No  Diabetes:No  Insulin use:No  Thyroid disease:No  TMJ osteoarthrosis:No  Wear dentures:No  CVA:No  COPD:No  MARITZA:No  Renal disease:No  Low serum albumin:No  Obesity:Yes     Exercise Capacity  Are you able to walk two flights of stairs::No feels SOB  Are you able to walk four blocks without symptoms:Yes     Lifestyle Factors  Alcohol use:no  Tobacco use:no  Illegal drug use:no     Symptoms  Easy bleeding:no  Easy bruising no   Frequent nosebleeds:no  Chest pain:no  Cough:no  Dyspnea:No  Edema:No  Palpitations:No  Wheezing:No    Pertinent Family History:  Any family members with the following problems:  Rx to anesthesia:No  Aneurysm:No  Bleeding problems:No  Sudden early deaths:No  Ischemic heart disease:Yes   Stroke:Yes       The following portions of the patient's history were reviewed and updated as appropriate:   She  has a past medical history of Bipolar affective disorder, depressed ([address] ), Heart murmur, Osteoarthritis, Psychiatric disorder, and Vitamin B12 deficiency    She   Patient Active Problem List    Diagnosis Date Noted    Seasonal allergic rhinitis due to pollen 01/23/2020    History of subarachnoid hemorrhage 01/22/2020    Localized osteoarthritis of left knee 01/22/2020    Stress incontinence 09/10/2019    Bipolar disorder, current episode mixed, mild ([address] ) 09/10/2019    Screening for breast cancer 08/23/2019    Screening for colon cancer 08/23/2019    Yeast infection of the skin 08/23/2019    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    Iron deficiency anemia secondary to inadequate dietary iron intake 05/17/2019    Dermatitis 05/06/2019    Pre-op examination 05/06/2019    Chronic pain of left knee 05/06/2019    Cervical spondylosis 01/09/2019    Cervical stenosis of spinal canal 12/13/2018    Anxiety state 04/07/2015    Anemia 01/14/2013    Arthropathy 10/03/2012    Neck pain 10/03/2012    Depression 10/03/2012    Gastroesophageal reflux disease 10/03/2012    Insomnia 10/03/2012    Vitamin B-complex deficiency 10/03/2012     She  has a past surgical history that includes Knee surgery (Right); Cervical spine surgery (2002); Gastric bypass (2004); Foot fracture surgery (Right, 2012); Foot surgery (Left); Cholecystectomy (05/1985); Hysterectomy; and Breast lumpectomy (Right)  Her family history includes Breast cancer in her maternal grandfather, maternal grandmother, paternal aunt, paternal aunt, paternal grandfather, and paternal grandmother; Breast cancer (age of onset: 77) in her half-sister; Coronary artery disease in her family; Diabetes in her father; Heart attack in her family; Heart disease in her father  She  reports that she has never smoked  She has never used smokeless tobacco  She reports that she drinks alcohol  She reports that she does not use drugs    Current Outpatient Medications   Medication Sig Dispense Refill    acetaminophen (TYLENOL) 650 mg CR tablet 1 as directed for pain      BUPROPION HCL ER, XL, PO every 24 hours        clotrimazole-betamethasone (LOTRISONE) 1-0 05 % cream Apply topically 2 (two) times a day 30 g 0    DULoxetine (CYMBALTA) 60 mg delayed release capsule every 12 (twelve) hours      gabapentin (NEURONTIN) 800 mg tablet   0    hydrOXYzine HCL (ATARAX) 50 mg tablet Take 50 mg by mouth daily at bedtime  0    ibuprofen (MOTRIN) 200 mg tablet Reported on 7/10/2017       levocetirizine (XYZAL) 5 MG tablet Take 1 tablet (5 mg total) by mouth every evening 30 tablet 3    nystatin (MYCOSTATIN) cream Apply topically 2 (two) times a day 30 g 2    omeprazole (PRILOSEC OTC) 20 MG tablet Take 1 tablet (20 mg total) by mouth 2 (two) times a day 60 tablet 3    QUEtiapine (SEROquel) 400 MG tablet Take 800 mg by mouth daily at bedtime  0    tiZANidine (ZANAFLEX) 4 mg tablet Take 4 mg by mouth 3 (three) times a day  0    Buprenorphine 15 MCG/HR PTWK        Current Facility-Administered Medications   Medication Dose Route Frequency Provider Last Rate Last Dose    cyanocobalamin injection 1,000 mcg  1,000 mcg Intramuscular Q30 Days Shiela Noonan MD   1,000 mcg at 09/20/18 1527    cyanocobalamin injection 1,000 mcg  1,000 mcg Intramuscular Q30 Days Shiela Noonan MD   1,000 mcg at 11/06/18 0853    cyanocobalamin injection 1,000 mcg  1,000 mcg Intramuscular Q30 Days Shiela Noonan MD   1,000 mcg at 12/06/18 1631    cyanocobalamin injection 1,000 mcg  1,000 mcg Intramuscular Q30 Days Katelin Rizvi MD   1,000 mcg at 01/07/19 1443    cyanocobalamin injection 1,000 mcg  1,000 mcg Intramuscular Q30 Days Clinton Wallis MD   1,000 mcg at 03/08/19 1501    cyanocobalamin injection 1,000 mcg  1,000 mcg Intramuscular Q30 Days WHITNEY Hwang   1,000 mcg at 03/11/19 1644    cyanocobalamin injection 1,000 mcg  1,000 mcg Intramuscular Q30 Days Katelin Rizvi MD   1,000 mcg at 04/08/19 1317    cyanocobalamin injection 1,000 mcg  1,000 mcg Intramuscular Q30 Days Katelin Rizvi MD   1,000 mcg at 07/01/19 1529    cyanocobalamin injection 1,000 mcg  1,000 mcg Intramuscular Q30 Days Katelin Rizvi MD   1,000 mcg at 08/02/19 1311    cyanocobalamin injection 1,000 mcg  1,000 mcg Intramuscular Q30 Days Katelin Rizvi MD   1,000 mcg at 09/03/19 1313    cyanocobalamin injection 1,000 mcg  1,000 mcg Intramuscular Q30 Days Katelin Rizvi MD   1,000 mcg at 10/07/19 1414    cyanocobalamin injection 1,000 mcg  1,000 mcg Intramuscular Q30 Days Katelin Rizvi MD   1,000 mcg at 11/21/19 1232    cyanocobalamin injection 1,000 mcg  1,000 mcg Intramuscular Q30 Days Katelin Rizvi MD   1,000 mcg at 12/23/19 1214     Current Outpatient Medications on File Prior to Visit   Medication Sig    acetaminophen (TYLENOL) 650 mg CR tablet 1 as directed for pain    BUPROPION HCL ER, XL, PO every 24 hours  clotrimazole-betamethasone (LOTRISONE) 1-0 05 % cream Apply topically 2 (two) times a day    DULoxetine (CYMBALTA) 60 mg delayed release capsule every 12 (twelve) hours    gabapentin (NEURONTIN) 800 mg tablet     hydrOXYzine HCL (ATARAX) 50 mg tablet Take 50 mg by mouth daily at bedtime    ibuprofen (MOTRIN) 200 mg tablet Reported on 7/10/2017     levocetirizine (XYZAL) 5 MG tablet Take 1 tablet (5 mg total) by mouth every evening    omeprazole (PRILOSEC OTC) 20 MG tablet Take 1 tablet (20 mg total) by mouth 2 (two) times a day    QUEtiapine (SEROquel) 400 MG tablet Take 800 mg by mouth daily at bedtime    tiZANidine (ZANAFLEX) 4 mg tablet Take 4 mg by mouth 3 (three) times a day    [DISCONTINUED] nystatin (MYCOSTATIN) cream Apply topically 2 (two) times a day    Buprenorphine 15 MCG/HR PTWK     [DISCONTINUED] Azelastine-Fluticasone 137-50 MCG/ACT SUSP Every 12 hours    [DISCONTINUED] Buprenorphine (BUTRANS) 5 MCG/HR PTWK apply 1 patch by transdermal route  every 7 days    [DISCONTINUED] Buprenorphine 10 MCG/HR PTWK APPLY 1 PATCH ON THE SKIN WEEKLY    [DISCONTINUED] GABAPENTIN PO Take by mouth    [DISCONTINUED] omeprazole (PRILOSEC OTC) 20 MG tablet Take 1 tablet (20 mg total) by mouth 2 (two) times a day (Patient not taking: Reported on 1/23/2020)     Current Facility-Administered Medications on File Prior to Visit   Medication    cyanocobalamin injection 1,000 mcg    cyanocobalamin injection 1,000 mcg    cyanocobalamin injection 1,000 mcg    cyanocobalamin injection 1,000 mcg    cyanocobalamin injection 1,000 mcg    cyanocobalamin injection 1,000 mcg    cyanocobalamin injection 1,000 mcg    cyanocobalamin injection 1,000 mcg    cyanocobalamin injection 1,000 mcg    cyanocobalamin injection 1,000 mcg    cyanocobalamin injection 1,000 mcg    cyanocobalamin injection 1,000 mcg    cyanocobalamin injection 1,000 mcg     She is allergic to prochlorperazine; rofecoxib; and erythromycin base     Review of Systems      Objective:      /82 (BP Location: Left arm, Patient Position: Sitting, Cuff Size: Large)   Pulse 98   Temp 98 °F (36 7 °C) (Tympanic)   Resp 16   Ht 5' 4" (1 626 m)   Wt 114 kg (251 lb 8 oz)   SpO2 92% Comment: pt has nail polish on  BMI 43 17 kg/m²          Physical Exam   Constitutional: She appears well-developed and well-nourished  No distress  HENT:   Head: Normocephalic and atraumatic  Right Ear: External ear normal    Left Ear: External ear normal    Mouth/Throat: Oropharynx is clear and moist    Neck: Neck supple  No thyromegaly present  Cardiovascular: Normal rate, regular rhythm and normal heart sounds  Exam reveals no gallop and no friction rub  No murmur heard  Pulmonary/Chest: Effort normal and breath sounds normal  No respiratory distress  She has no wheezes  She has no rales  Abdominal: Soft  Bowel sounds are normal  She exhibits no mass  There is no tenderness  Musculoskeletal: She exhibits no edema  Lymphadenopathy:     She has no cervical adenopathy  Neurological: She is alert  Skin: Skin is warm  Psychiatric: She has a normal mood and affect

## 2020-01-28 NOTE — TELEPHONE ENCOUNTER
Alpa Hutchins from the office of Dr Enrique Sanchez with [address]  called stating that they have not received a clearance letter   I did advise that a letter was faxed 1/17, she stated that they received  letter but it only stated that the patient would be cleared pending the results of her PT & PTT   Fax [phone] Phone [phone]

## 2020-01-28 NOTE — TELEPHONE ENCOUNTER
Dr Viktoriya Marinelli gave hematologic clearance for surgery on 1/30/20  Pending the results of her PTT and PT tests (in normal range) that were supposed to be completed by [facility]  Dr Viktoriya Marinelli did write note on clearance letter stating this on 1/17/20

## 2020-01-28 NOTE — TELEPHONE ENCOUNTER
Attempted to call again, and got put on hold for 10 minutes  I had no choice but to hang up the phone  I LVM once again for someone to call me back

## 2020-01-29 ENCOUNTER — TELEPHONE (OUTPATIENT)
Dept: FAMILY MEDICINE CLINIC | Facility: CLINIC | Age: 60
End: 2020-01-29

## 2020-01-29 NOTE — TELEPHONE ENCOUNTER
[facility] [address] , states they never received Rosemaries pre op physical  Requested it be faxed to [phone]  Re faxed & confirmation received

## 2020-03-17 ENCOUNTER — OFFICE VISIT (OUTPATIENT)
Dept: FAMILY MEDICINE CLINIC | Facility: CLINIC | Age: 60
End: 2020-03-17
Payer: MEDICARE

## 2020-03-17 VITALS
TEMPERATURE: 97.9 F | OXYGEN SATURATION: 95 % | DIASTOLIC BLOOD PRESSURE: 84 MMHG | SYSTOLIC BLOOD PRESSURE: 120 MMHG | RESPIRATION RATE: 16 BRPM | HEART RATE: 94 BPM | BODY MASS INDEX: 42.2 KG/M2 | WEIGHT: 247.2 LBS | HEIGHT: 64 IN

## 2020-03-17 DIAGNOSIS — L30.9 DERMATITIS: ICD-10-CM

## 2020-03-17 DIAGNOSIS — J30.1 SEASONAL ALLERGIC RHINITIS DUE TO POLLEN: ICD-10-CM

## 2020-03-17 DIAGNOSIS — K21.9 GERD WITHOUT ESOPHAGITIS: ICD-10-CM

## 2020-03-17 DIAGNOSIS — E66.01 MORBID OBESITY WITH BMI OF 40.0-44.9, ADULT (HCC): ICD-10-CM

## 2020-03-17 DIAGNOSIS — F31.61 BIPOLAR DISORDER, CURRENT EPISODE MIXED, MILD (HCC): ICD-10-CM

## 2020-03-17 DIAGNOSIS — Z00.00 MEDICARE ANNUAL WELLNESS VISIT, SUBSEQUENT: ICD-10-CM

## 2020-03-17 DIAGNOSIS — E78.2 MIXED HYPERLIPIDEMIA: ICD-10-CM

## 2020-03-17 DIAGNOSIS — B37.2 YEAST INFECTION OF THE SKIN: ICD-10-CM

## 2020-03-17 DIAGNOSIS — G89.29 CHRONIC PAIN OF LEFT KNEE: ICD-10-CM

## 2020-03-17 DIAGNOSIS — M25.562 CHRONIC PAIN OF LEFT KNEE: ICD-10-CM

## 2020-03-17 DIAGNOSIS — Z12.11 SCREENING FOR COLON CANCER: ICD-10-CM

## 2020-03-17 DIAGNOSIS — E53.8 VITAMIN B 12 DEFICIENCY: Primary | ICD-10-CM

## 2020-03-17 PROCEDURE — 1036F TOBACCO NON-USER: CPT | Performed by: PHYSICIAN ASSISTANT

## 2020-03-17 PROCEDURE — 3008F BODY MASS INDEX DOCD: CPT | Performed by: PHYSICIAN ASSISTANT

## 2020-03-17 PROCEDURE — 96372 THER/PROPH/DIAG INJ SC/IM: CPT

## 2020-03-17 PROCEDURE — G0439 PPPS, SUBSEQ VISIT: HCPCS | Performed by: PHYSICIAN ASSISTANT

## 2020-03-17 PROCEDURE — 99214 OFFICE O/P EST MOD 30 MIN: CPT | Performed by: PHYSICIAN ASSISTANT

## 2020-03-17 RX ORDER — OXYCODONE HYDROCHLORIDE AND ACETAMINOPHEN 5; 325 MG/1; MG/1
1 TABLET ORAL EVERY 6 HOURS PRN
COMMUNITY
Start: 2020-02-02

## 2020-03-17 RX ORDER — OMEPRAZOLE 20 MG/1
20 TABLET, DELAYED RELEASE ORAL DAILY
Qty: 90 TABLET | Refills: 1 | Status: SHIPPED | OUTPATIENT
Start: 2020-03-17 | End: 2020-05-21

## 2020-03-17 RX ORDER — NYSTATIN 100000 U/G
CREAM TOPICAL 2 TIMES DAILY
Qty: 30 G | Refills: 2 | Status: SHIPPED | OUTPATIENT
Start: 2020-03-17

## 2020-03-17 RX ORDER — CLOTRIMAZOLE AND BETAMETHASONE DIPROPIONATE 10; .64 MG/G; MG/G
CREAM TOPICAL 2 TIMES DAILY
Qty: 30 G | Refills: 0 | Status: SHIPPED | OUTPATIENT
Start: 2020-03-17 | End: 2020-10-15 | Stop reason: SDUPTHER

## 2020-03-17 RX ORDER — TRAMADOL HYDROCHLORIDE 50 MG/1
50 TABLET ORAL EVERY 8 HOURS PRN
COMMUNITY
Start: 2020-02-15 | End: 2020-09-18 | Stop reason: SDUPTHER

## 2020-03-17 RX ORDER — BUSPIRONE HYDROCHLORIDE 15 MG/1
15 TABLET ORAL 2 TIMES DAILY
COMMUNITY
Start: 2020-02-21

## 2020-03-17 RX ORDER — BUPROPION HYDROCHLORIDE 100 MG/1
200 TABLET ORAL 2 TIMES DAILY
COMMUNITY
Start: 2020-02-24

## 2020-03-17 RX ORDER — LEVOCETIRIZINE DIHYDROCHLORIDE 5 MG/1
5 TABLET, FILM COATED ORAL EVERY EVENING
Qty: 90 TABLET | Refills: 1 | Status: SHIPPED | OUTPATIENT
Start: 2020-03-17 | End: 2020-10-02 | Stop reason: SDUPTHER

## 2020-03-17 RX ORDER — CYANOCOBALAMIN 1000 UG/ML
1000 INJECTION INTRAMUSCULAR; SUBCUTANEOUS
Status: DISCONTINUED | OUTPATIENT
Start: 2020-03-17 | End: 2021-04-15

## 2020-03-17 RX ORDER — ARIPIPRAZOLE 10 MG/1
TABLET ORAL
COMMUNITY
Start: 2020-02-11

## 2020-03-17 RX ADMIN — CYANOCOBALAMIN 1000 MCG: 1000 INJECTION INTRAMUSCULAR; SUBCUTANEOUS at 11:45

## 2020-03-17 NOTE — PROGRESS NOTES
Assessment and Plan:     Problem List Items Addressed This Visit        Musculoskeletal and Integument    Dermatitis       Other    Screening for colon cancer    Relevant Orders    Occult Blood, Fecal Immunochemical    Morbid obesity with BMI of 40 0-44 9, adult ([place] [id number] )    Medicare annual wellness visit, subsequent      Other Visit Diagnoses     Vitamin B 12 deficiency    -  Primary    Relevant Medications    cyanocobalamin injection 1,000 mcg           Preventive health issues were discussed with patient, and age appropriate screening tests were ordered as noted in patient's After Visit Summary  Personalized health advice and appropriate referrals for health education or preventive services given if needed, as noted in patient's After Visit Summary       History of Present Illness:     Patient presents for Medicare Annual Wellness visit    Patient Care Team:  Randy Neville PA-C as PCP - General (Family Medicine)     Problem List:     Patient Active Problem List   Diagnosis    Anemia    Anxiety state    Arthropathy    Neck pain    Depression    Gastroesophageal reflux disease    Insomnia    Cervical stenosis of spinal canal    Vitamin B-complex deficiency    Dermatitis    Pre-op examination    Chronic pain of left knee    Postgastrectomy malabsorption    Iron deficiency anemia secondary to inadequate dietary iron intake    Screening for breast cancer    Screening for colon cancer    Yeast infection of the skin    Stress incontinence    Bipolar disorder, current episode mixed, mild (HCC)    History of subarachnoid hemorrhage    Cervical spondylosis    Localized osteoarthritis of left knee    Seasonal allergic rhinitis due to pollen    Morbid obesity with BMI of 40 0-44 9, adult ([facility] )    Medicare annual wellness visit, subsequent      Past Medical and Surgical History:     Past Medical History:   Diagnosis Date    Bipolar affective disorder, depressed ([place] [id number] )     Heart murmur     Osteoarthritis     Psychiatric disorder     Vitamin B12 deficiency      Past Surgical History:   Procedure Laterality Date    BREAST LUMPECTOMY Right     benign    CERVICAL SPINE SURGERY  2002    CHOLECYSTECTOMY  05/1985    FOOT FRACTURE SURGERY Right 2012    outcome - good    FOOT SURGERY Left     GASTRIC BYPASS  2004    x2- 2nd bypass 10/12    HYSTERECTOMY      age 54    KNEE SURGERY Right       Family History:     Family History   Problem Relation Age of Onset    Diabetes Father     Heart disease Father     Coronary artery disease Family     Heart attack Family     Breast cancer Maternal Grandmother     Breast cancer Maternal Grandfather     Breast cancer Paternal Grandmother     Breast cancer Paternal Grandfather     Breast cancer Half-Sister 77    Breast cancer Paternal Aunt     Breast cancer Paternal Aunt       Social History:     E-Cigarette/Vaping    E-Cigarette Use Never User      E-Cigarette/Vaping Substances    Nicotine No     THC No     CBD No     Flavoring No     Other No     Unknown No      Social History     Socioeconomic History    Marital status:      Spouse name: None    Number of children: None    Years of education: None    Highest education level: None   Occupational History    None   Social Needs    Financial resource strain: None    Food insecurity:     Worry: None     Inability: None    Transportation needs:     Medical: None     Non-medical: None   Tobacco Use    Smoking status: Never Smoker    Smokeless tobacco: Never Used    Tobacco comment: passive smoke exposure   Substance and Sexual Activity    Alcohol use:  Yes    Drug use: No    Sexual activity: None   Lifestyle    Physical activity:     Days per week: None     Minutes per session: None    Stress: None   Relationships    Social connections:     Talks on phone: None     Gets together: None     Attends [employer] service: None     Active member of club or organization: None     Attends meetings of clubs or organizations: None     Relationship status: None    Intimate partner violence:     Fear of current or ex partner: None     Emotionally abused: None     Physically abused: None     Forced sexual activity: None   Other Topics Concern    None   Social History Narrative    None      Medications and Allergies:     Current Outpatient Medications   Medication Sig Dispense Refill    acetaminophen (TYLENOL) 650 mg CR tablet 1 as directed for pain      ARIPiprazole (ABILIFY) 10 mg tablet       buPROPion (WELLBUTRIN) 100 mg tablet Take 200 mg by mouth 2 (two) times a day       busPIRone (BUSPAR) 15 mg tablet Take 15 mg by mouth 2 (two) times a day      clotrimazole-betamethasone (LOTRISONE) 1-0 05 % cream Apply topically 2 (two) times a day 30 g 0    DULoxetine (CYMBALTA) 60 mg delayed release capsule Take 120 mg by mouth daily       gabapentin (NEURONTIN) 800 mg tablet Take 1,600 mg by mouth daily at bedtime   0    hydrOXYzine HCL (ATARAX) 50 mg tablet Take 50 mg by mouth daily at bedtime  0    ibuprofen (MOTRIN) 200 mg tablet Reported on 7/10/2017       levocetirizine (XYZAL) 5 MG tablet Take 1 tablet (5 mg total) by mouth every evening 30 tablet 3    nystatin (MYCOSTATIN) cream Apply topically 2 (two) times a day 30 g 2    omeprazole (PRILOSEC OTC) 20 MG tablet Take 1 tablet (20 mg total) by mouth 2 (two) times a day (Patient taking differently: Take 40 mg by mouth 2 (two) times a day ) 60 tablet 3    oxyCODONE-acetaminophen (PERCOCET) 5-325 mg per tablet Take 1 tablet by mouth every 6 (six) hours as needed      tiZANidine (ZANAFLEX) 4 mg tablet Take 4 mg by mouth 3 (three) times a day  0    QUEtiapine (SEROquel) 400 MG tablet Take 800 mg by mouth daily at bedtime  0    traMADol (ULTRAM) 50 mg tablet Take 50 mg by mouth every 8 (eight) hours as needed       Current Facility-Administered Medications   Medication Dose Route Frequency Provider Last Rate Last Dose    cyanocobalamin injection 1,000 mcg  1,000 mcg Intramuscular Q30 Days Evens Daugherty MD   1,000 mcg at 09/20/18 1527    cyanocobalamin injection 1,000 mcg  1,000 mcg Intramuscular Q30 Days Evens Daugherty MD   1,000 mcg at 11/06/18 0853    cyanocobalamin injection 1,000 mcg  1,000 mcg Intramuscular Q30 Days Evens Daugherty MD   1,000 mcg at 12/06/18 1631    cyanocobalamin injection 1,000 mcg  1,000 mcg Intramuscular Q30 Days Ayde Whitehead MD   1,000 mcg at 01/07/19 1443    cyanocobalamin injection 1,000 mcg  1,000 mcg Intramuscular Q30 Days Ayde Whitehead MD   1,000 mcg at 03/08/19 1501    cyanocobalamin injection 1,000 mcg  1,000 mcg Intramuscular Q30 Days WHITNEY Hwang   1,000 mcg at 03/11/19 1644    cyanocobalamin injection 1,000 mcg  1,000 mcg Intramuscular Q30 Days Ayde Whitehead MD   1,000 mcg at 04/08/19 1317    cyanocobalamin injection 1,000 mcg  1,000 mcg Intramuscular Q30 Days Ayde Whitehead MD   1,000 mcg at 07/01/19 1529    cyanocobalamin injection 1,000 mcg  1,000 mcg Intramuscular Q30 Days Ayde Whitehead MD   1,000 mcg at 08/02/19 1311    cyanocobalamin injection 1,000 mcg  1,000 mcg Intramuscular Q30 Days Ayde Whitehead MD   1,000 mcg at 09/03/19 1313    cyanocobalamin injection 1,000 mcg  1,000 mcg Intramuscular Q30 Days Ayde Whitehead MD   1,000 mcg at 10/07/19 1414    cyanocobalamin injection 1,000 mcg  1,000 mcg Intramuscular Q30 Days Ayde Whitehead MD   1,000 mcg at 11/21/19 1232    cyanocobalamin injection 1,000 mcg  1,000 mcg Intramuscular Q30 Days Ayde Whitehead MD   1,000 mcg at 12/23/19 1214    cyanocobalamin injection 1,000 mcg  1,000 mcg Intramuscular Q30 Days Franci Vergara PA-C   1,000 mcg at 01/23/20 1353    cyanocobalamin injection 1,000 mcg  1,000 mcg Intramuscular Q30 Days Franci Vergara PA-C   1,000 mcg at 03/17/20 1145     Allergies   Allergen Reactions    Prochlorperazine Other (See Comments)     seizure,swollen tongue    Rofecoxib Other (See Comments)     internal bleeding    Erythromycin Base Other (See Comments)     seizure      Immunizations:     Immunization History   Administered Date(s) Administered    INFLUENZA 09/20/2016, 09/20/2016, 11/16/2017, 11/16/2017    Influenza Split 09/17/2013    Influenza TIV (IM) 07/02/2012    Influenza, injectable, quadrivalent, preservative free 0 5 mL 09/20/2018    Influenza, recombinant, quadrivalent,injectable, preservative free 11/21/2019    Tdap 10/22/2003, 06/25/2017      Health Maintenance:         Topic Date Due    Cervical Cancer Screening  12/19/1980    CRC Screening: FOBTx3/FIT  12/19/2009    MAMMOGRAM  09/03/2020    Hepatitis C Screening  Completed     There are no preventive care reminders to display for this patient  Medicare Health Risk Assessment:     /84 (BP Location: Left arm, Patient Position: Sitting, Cuff Size: Large)   Pulse 94   Temp 97 9 °F (36 6 °C) (Tympanic)   Resp 16   Ht 5' 4" (1 626 m)   Wt 112 kg (247 lb 3 2 oz)   SpO2 95%   BMI 42 43 kg/m²      Sofie Powell is here for her Subsequent Wellness visit  Health Risk Assessment:   Patient rates overall health as fair  Patient feels that their physical health rating is same  Eyesight was rated as same  Hearing was rated as same  Patient feels that their emotional and mental health rating is same  Pain experienced in the last 7 days has been some  Patient's pain rating has been 6/10  Patient states that she has experienced no weight loss or gain in last 6 months  Depression Screening:   PHQ-2 Score: 0  PHQ-9 Score: 3      Fall Risk Screening: In the past year, patient has experienced: history of falling in past year    Number of falls: 2 or more  Injured during fall?: No    Feels unsteady when standing or walking?: Yes    Worried about falling?: Yes      Urinary Incontinence Screening:   Patient has not leaked urine accidently in the last six months  Home Safety:  Patient has trouble with stairs inside or outside of their home  Patient has working smoke alarms and has working carbon monoxide detector  Home safety hazards include: none  Nutrition:   Current diet is Regular  Medications:   Patient is currently taking over-the-counter supplements  OTC medications include: see medication list  Patient is able to manage medications  Activities of Daily Living (ADLs)/Instrumental Activities of Daily Living (IADLs):   Walk and transfer into and out of bed and chair?: Yes  Dress and groom yourself?: Yes    Bathe or shower yourself?: Yes    Feed yourself?  Yes  Do your laundry/housekeeping?: Yes  Manage your money, pay your bills and track your expenses?: Yes  Make your own meals?: Yes    Do your own shopping?: Yes    Previous Hospitalizations:   Any hospitalizations or ED visits within the last 12 months?: Yes    How many hospitalizations have you had in the last year?: 1-2    Hospitalization Comments: Knee replacement    Advance Care Planning:   Living will: No    Durable POA for healthcare: No    Advanced directive: No      PREVENTIVE SCREENINGS      Cardiovascular Screening:    General: Screening Current      Diabetes Screening:     General: Screening Current      Colorectal Cancer Screening:     General: Risks and Benefits Discussed    Due for: FOBT/FIT      Breast Cancer Screening:     General: Screening Current      Cervical Cancer Screening:    General: Screening Not Indicated      Abdominal Aortic Aneurysm (AAA) Screening:        General: Screening Not Indicated      Lung Cancer Screening:     General: Screening Not Indicated      Hepatitis C Screening:    General: Screening Current      Franci Vergara PA-C

## 2020-03-17 NOTE — PROGRESS NOTES
Assessment/Plan:    Medicare wellness has been done as a separate visit today    -5 wishes have been given today  Importance of returning this to our office has been discussed once it has been completed  -she has been given a no other fit test   She prefers not to proceed with a colonoscopy  -vitamin- B12 given today  -she is requesting a refill of the Lotrimin cream for the rash underneath her armpits  She states that does work effectively   -she will continue follow-up with Orthopedic surgery for her left total knee replacement does scheduled along with physical therapy  -I did discuss the side effects of chronic use of the omeprazole  I did reduce her down from 40 mg once daily down to 20 mg once daily  She states her some days that she does not even needed  I did advise her on the risks of osteoporosis, kidney problems and cardiac problems  -continue follow-up with Psychiatry for depression as advised  -patient has been educated about prevention of corona virus  -recommend follow-up in July, she has been advised to proceed with fasting blood work prior to that appointment  M*Modal software was used to dictate this note  It may contain errors with dictating incorrect words/spelling  Please contact provider directly for any questions  Diagnoses and all orders for this visit:    Vitamin B 12 deficiency  -     cyanocobalamin injection 1,000 mcg    Medicare annual wellness visit, subsequent    Morbid obesity with BMI of 40 0-44 9, adult ([facility] [address] )    Dermatitis  Comments:  She was given Lotrisone cream   Orders:  -     clotrimazole-betamethasone (LOTRISONE) 1-0 05 % cream; Apply topically 2 (two) times a day    Screening for colon cancer  -     Occult Blood, Fecal Immunochemical; Future    Yeast infection of the skin  -     nystatin (MYCOSTATIN) cream; Apply topically 2 (two) times a day    Seasonal allergic rhinitis due to pollen  -     levocetirizine (XYZAL) 5 MG tablet;  Take 1 tablet (5 mg total) by mouth every evening    GERD without esophagitis  Comments:  Controlled, Refilled omeprazole  Orders:  -     omeprazole (PriLOSEC OTC) 20 MG tablet; Take 1 tablet (20 mg total) by mouth daily  -     Comprehensive metabolic panel    Chronic pain of left knee    Bipolar disorder, current episode mixed, mild (HCC)  -     Comprehensive metabolic panel  -     Lipid panel    Mixed hyperlipidemia  -     Comprehensive metabolic panel  -     Lipid panel    Other orders  -     buPROPion (WELLBUTRIN) 100 mg tablet; Take 200 mg by mouth 2 (two) times a day   -     ARIPiprazole (ABILIFY) 10 mg tablet  -     busPIRone (BUSPAR) 15 mg tablet; Take 15 mg by mouth 2 (two) times a day  -     oxyCODONE-acetaminophen (PERCOCET) 5-325 mg per tablet; Take 1 tablet by mouth every 6 (six) hours as needed  -     traMADol (ULTRAM) 50 mg tablet; Take 50 mg by mouth every 8 (eight) hours as needed          Subjective:      Patient ID: Jennifer Morris is a 60 y o  female  Patient presents today for routine follow-up  She states that she also needs her vitamin B12 injection today  She does follow with Hematology for anemia and does get iron levels on a regular basis  Her most recent B12 level done on January 22nd was normal   She states she does feel tired although she just had a total knee replacement done on January 30th  This was done on the left side  She states that she has been doing really well since the surgery  On occasion she feels as though her left knee gives out but she has been stabilized with the walker  She denies any falls  She does not take any medications for her cholesterol  She did have a recent blood test done on January 22nd which revealed a cholesterol level of 205  She does follow with Psychiatry for depression and is stabilized on her medications  She also needs refills of her Xyzal for her allergies        The following portions of the patient's history were reviewed and updated as appropriate:   She  has a past medical history of Bipolar affective disorder, depressed ([facility] [address] ), Heart murmur, Osteoarthritis, Psychiatric disorder, and Vitamin B12 deficiency  She   Patient Active Problem List    Diagnosis Date Noted    Morbid obesity with BMI of 40 0-44 9, adult ([facility] ) 03/17/2020    Medicare annual wellness visit, subsequent 03/17/2020    Mixed hyperlipidemia 03/17/2020    Seasonal allergic rhinitis due to pollen 01/23/2020    History of subarachnoid hemorrhage 01/22/2020    Localized osteoarthritis of left knee 01/22/2020    Stress incontinence 09/10/2019    Bipolar disorder, current episode mixed, mild ([facility] ) 09/10/2019    Screening for breast cancer 08/23/2019    Screening for colon cancer 08/23/2019    Yeast infection of the skin 08/23/2019    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    Iron deficiency anemia secondary to inadequate dietary iron intake 05/17/2019    Dermatitis 05/06/2019    Pre-op examination 05/06/2019    Chronic pain of left knee 05/06/2019    Cervical spondylosis 01/09/2019    Cervical stenosis of spinal canal 12/13/2018    Arthropathy 10/03/2012    Neck pain 10/03/2012    GERD without esophagitis 10/03/2012    Insomnia 10/03/2012    Vitamin B-complex deficiency 10/03/2012     She  has a past surgical history that includes Knee surgery (Right); Cervical spine surgery (2002); Gastric bypass (2004); Foot fracture surgery (Right, 2012); Foot surgery (Left); Cholecystectomy (05/1985); Hysterectomy; and Breast lumpectomy (Right)  Her family history includes Breast cancer in her maternal grandfather, maternal grandmother, paternal aunt, paternal aunt, paternal grandfather, and paternal grandmother; Breast cancer (age of onset: 77) in her half-sister; Coronary artery disease in her family; Diabetes in her father; Heart attack in her family; Heart disease in her father  She  reports that she has never smoked  She has never used smokeless tobacco  She reports that she drinks alcohol  She reports that she does not use drugs    Current Outpatient Medications   Medication Sig Dispense Refill    acetaminophen (TYLENOL) 650 mg CR tablet 1 as directed for pain      ARIPiprazole (ABILIFY) 10 mg tablet       buPROPion (WELLBUTRIN) 100 mg tablet Take 200 mg by mouth 2 (two) times a day       busPIRone (BUSPAR) 15 mg tablet Take 15 mg by mouth 2 (two) times a day      clotrimazole-betamethasone (LOTRISONE) 1-0 05 % cream Apply topically 2 (two) times a day 30 g 0    DULoxetine (CYMBALTA) 60 mg delayed release capsule Take 120 mg by mouth daily       gabapentin (NEURONTIN) 800 mg tablet Take 1,600 mg by mouth daily at bedtime   0    hydrOXYzine HCL (ATARAX) 50 mg tablet Take 50 mg by mouth daily at bedtime  0    ibuprofen (MOTRIN) 200 mg tablet Reported on 7/10/2017       levocetirizine (XYZAL) 5 MG tablet Take 1 tablet (5 mg total) by mouth every evening 90 tablet 1    nystatin (MYCOSTATIN) cream Apply topically 2 (two) times a day 30 g 2    omeprazole (PriLOSEC OTC) 20 MG tablet Take 1 tablet (20 mg total) by mouth daily 90 tablet 1    oxyCODONE-acetaminophen (PERCOCET) 5-325 mg per tablet Take 1 tablet by mouth every 6 (six) hours as needed      tiZANidine (ZANAFLEX) 4 mg tablet Take 4 mg by mouth 3 (three) times a day  0    QUEtiapine (SEROquel) 400 MG tablet Take 800 mg by mouth daily at bedtime  0    traMADol (ULTRAM) 50 mg tablet Take 50 mg by mouth every 8 (eight) hours as needed       Current Facility-Administered Medications   Medication Dose Route Frequency Provider Last Rate Last Dose    cyanocobalamin injection 1,000 mcg  1,000 mcg Intramuscular Q30 Days Rodolfo Rodriguez MD   1,000 mcg at 09/20/18 1527    cyanocobalamin injection 1,000 mcg  1,000 mcg Intramuscular Q30 Days Rodolfo Rodriguez MD   1,000 mcg at 11/06/18 0853    cyanocobalamin injection 1,000 mcg  1,000 mcg Intramuscular Q30 Days Rodolfo Rodriguez MD   1,000 mcg at 12/06/18 1631    cyanocobalamin injection 1,000 mcg  1,000 mcg Intramuscular Q30 Days Ting Galeano MD   1,000 mcg at 01/07/19 1443    cyanocobalamin injection 1,000 mcg  1,000 mcg Intramuscular Q30 Days Ting Galeano MD   1,000 mcg at 03/08/19 1501    cyanocobalamin injection 1,000 mcg  1,000 mcg Intramuscular Q30 Days WHITNEY Hwang   1,000 mcg at 03/11/19 1644    cyanocobalamin injection 1,000 mcg  1,000 mcg Intramuscular Q30 Days Ting Galeano MD   1,000 mcg at 04/08/19 1317    cyanocobalamin injection 1,000 mcg  1,000 mcg Intramuscular Q30 Days Ting Galeano MD   1,000 mcg at 07/01/19 1529    cyanocobalamin injection 1,000 mcg  1,000 mcg Intramuscular Q30 Days Ting Galeano MD   1,000 mcg at 08/02/19 1311    cyanocobalamin injection 1,000 mcg  1,000 mcg Intramuscular Q30 Days Ting Galeano MD   1,000 mcg at 09/03/19 1313    cyanocobalamin injection 1,000 mcg  1,000 mcg Intramuscular Q30 Days Ting Galeano MD   1,000 mcg at 10/07/19 1414    cyanocobalamin injection 1,000 mcg  1,000 mcg Intramuscular Q30 Days Ting Galeano MD   1,000 mcg at 11/21/19 1232    cyanocobalamin injection 1,000 mcg  1,000 mcg Intramuscular Q30 Days Ting Galeano MD   1,000 mcg at 12/23/19 1214    cyanocobalamin injection 1,000 mcg  1,000 mcg Intramuscular Q30 Days Franci Vergara PA-C   1,000 mcg at 01/23/20 1353    cyanocobalamin injection 1,000 mcg  1,000 mcg Intramuscular Q30 Days Franci Vergara PA-C   1,000 mcg at 03/17/20 1145     Current Outpatient Medications on File Prior to Visit   Medication Sig    acetaminophen (TYLENOL) 650 mg CR tablet 1 as directed for pain    ARIPiprazole (ABILIFY) 10 mg tablet     buPROPion (WELLBUTRIN) 100 mg tablet Take 200 mg by mouth 2 (two) times a day     busPIRone (BUSPAR) 15 mg tablet Take 15 mg by mouth 2 (two) times a day    DULoxetine (CYMBALTA) 60 mg delayed release capsule Take 120 mg by mouth daily     gabapentin (NEURONTIN) 800 mg tablet Take 1,600 mg by mouth daily at bedtime     hydrOXYzine HCL (ATARAX) 50 mg tablet Take 50 mg by mouth daily at bedtime    ibuprofen (MOTRIN) 200 mg tablet Reported on 7/10/2017     oxyCODONE-acetaminophen (PERCOCET) 5-325 mg per tablet Take 1 tablet by mouth every 6 (six) hours as needed    tiZANidine (ZANAFLEX) 4 mg tablet Take 4 mg by mouth 3 (three) times a day    [DISCONTINUED] Buprenorphine 15 MCG/HR PTWK     [DISCONTINUED] clotrimazole-betamethasone (LOTRISONE) 1-0 05 % cream Apply topically 2 (two) times a day    [DISCONTINUED] levocetirizine (XYZAL) 5 MG tablet Take 1 tablet (5 mg total) by mouth every evening    [DISCONTINUED] nystatin (MYCOSTATIN) cream Apply topically 2 (two) times a day    [DISCONTINUED] omeprazole (PRILOSEC OTC) 20 MG tablet Take 1 tablet (20 mg total) by mouth 2 (two) times a day (Patient taking differently: Take 40 mg by mouth 2 (two) times a day )    QUEtiapine (SEROquel) 400 MG tablet Take 800 mg by mouth daily at bedtime    traMADol (ULTRAM) 50 mg tablet Take 50 mg by mouth every 8 (eight) hours as needed    [DISCONTINUED] BUPROPION HCL ER, XL, PO every 24 hours       Current Facility-Administered Medications on File Prior to Visit   Medication    cyanocobalamin injection 1,000 mcg    cyanocobalamin injection 1,000 mcg    cyanocobalamin injection 1,000 mcg    cyanocobalamin injection 1,000 mcg    cyanocobalamin injection 1,000 mcg    cyanocobalamin injection 1,000 mcg    cyanocobalamin injection 1,000 mcg    cyanocobalamin injection 1,000 mcg    cyanocobalamin injection 1,000 mcg    cyanocobalamin injection 1,000 mcg    cyanocobalamin injection 1,000 mcg    cyanocobalamin injection 1,000 mcg    cyanocobalamin injection 1,000 mcg    cyanocobalamin injection 1,000 mcg     She is allergic to prochlorperazine; rofecoxib; and erythromycin base       Review of Systems   Constitutional: Positive for fatigue (Patient states that she has always had fatigue even before her left total knee replacement on January 30)  Respiratory: Negative for cough and shortness of breath  Cardiovascular: Positive for leg swelling ( she states she did have some mild left leg swelling the other day which has resolved  She has been advised to elevate her leg higher above her heart by therapy)  Negative for chest pain  Gastrointestinal: Negative for abdominal pain and blood in stool  Psychiatric/Behavioral:        As stated in HPI         Objective:      /84 (BP Location: Left arm, Patient Position: Sitting, Cuff Size: Large)   Pulse 94   Temp 97 9 °F (36 6 °C) (Tympanic)   Resp 16   Ht 5' 4" (1 626 m)   Wt 112 kg (247 lb 3 2 oz)   SpO2 95%   BMI 42 43 kg/m²          Physical Exam   Constitutional: She appears well-developed and well-nourished  No distress  HENT:   Head: Normocephalic and atraumatic  Right Ear: External ear normal    Left Ear: External ear normal    Mouth/Throat: Oropharynx is clear and moist  No oropharyngeal exudate  Neck: Neck supple  No thyromegaly present  Cardiovascular: Normal rate, regular rhythm and normal heart sounds  Exam reveals no gallop and no friction rub  No murmur heard  Pulmonary/Chest: Effort normal and breath sounds normal  No respiratory distress  She has no wheezes  She has no rales  Abdominal: Soft  Bowel sounds are normal  She exhibits no mass  There is no tenderness  Musculoskeletal: She exhibits no deformity  Lymphadenopathy:     She has no cervical adenopathy  Neurological: She is alert  Skin: Skin is warm  Psychiatric: She has a normal mood and affect

## 2020-04-14 ENCOUNTER — TELEPHONE (OUTPATIENT)
Dept: HEMATOLOGY ONCOLOGY | Facility: CLINIC | Age: 60
End: 2020-04-14

## 2020-04-14 ENCOUNTER — CLINICAL SUPPORT (OUTPATIENT)
Dept: FAMILY MEDICINE CLINIC | Facility: CLINIC | Age: 60
End: 2020-04-14
Payer: MEDICARE

## 2020-04-14 DIAGNOSIS — E53.8 VITAMIN B 12 DEFICIENCY: Primary | ICD-10-CM

## 2020-04-14 PROCEDURE — 96372 THER/PROPH/DIAG INJ SC/IM: CPT

## 2020-04-14 RX ORDER — CYANOCOBALAMIN 1000 UG/ML
1000 INJECTION INTRAMUSCULAR; SUBCUTANEOUS
Status: DISCONTINUED | OUTPATIENT
Start: 2020-04-14 | End: 2021-04-15

## 2020-04-14 RX ADMIN — CYANOCOBALAMIN 1000 MCG: 1000 INJECTION INTRAMUSCULAR; SUBCUTANEOUS at 13:38

## 2020-04-15 ENCOUNTER — TELEPHONE (OUTPATIENT)
Dept: HEMATOLOGY ONCOLOGY | Facility: CLINIC | Age: 60
End: 2020-04-15

## 2020-04-15 ENCOUNTER — TELEMEDICINE (OUTPATIENT)
Dept: HEMATOLOGY ONCOLOGY | Facility: CLINIC | Age: 60
End: 2020-04-15
Payer: MEDICARE

## 2020-04-15 ENCOUNTER — TELEPHONE (OUTPATIENT)
Dept: GASTROENTEROLOGY | Facility: CLINIC | Age: 60
End: 2020-04-15

## 2020-04-15 DIAGNOSIS — D50.8 IRON DEFICIENCY ANEMIA SECONDARY TO INADEQUATE DIETARY IRON INTAKE: ICD-10-CM

## 2020-04-15 DIAGNOSIS — Z90.3 POSTGASTRECTOMY MALABSORPTION: Primary | ICD-10-CM

## 2020-04-15 DIAGNOSIS — K91.2 POSTGASTRECTOMY MALABSORPTION: Primary | ICD-10-CM

## 2020-04-15 PROCEDURE — 99213 OFFICE O/P EST LOW 20 MIN: CPT | Performed by: INTERNAL MEDICINE

## 2020-05-05 ENCOUNTER — TELEMEDICINE (OUTPATIENT)
Dept: GASTROENTEROLOGY | Facility: CLINIC | Age: 60
End: 2020-05-05
Payer: MEDICARE

## 2020-05-05 VITALS — HEIGHT: 64 IN | BODY MASS INDEX: 42.17 KG/M2 | TEMPERATURE: 97.2 F | WEIGHT: 247 LBS

## 2020-05-05 DIAGNOSIS — Z90.3 POSTGASTRECTOMY MALABSORPTION: ICD-10-CM

## 2020-05-05 DIAGNOSIS — D50.8 IRON DEFICIENCY ANEMIA SECONDARY TO INADEQUATE DIETARY IRON INTAKE: Primary | ICD-10-CM

## 2020-05-05 DIAGNOSIS — E66.01 MORBID OBESITY WITH BMI OF 40.0-44.9, ADULT (HCC): ICD-10-CM

## 2020-05-05 DIAGNOSIS — K21.9 GERD WITHOUT ESOPHAGITIS: ICD-10-CM

## 2020-05-05 DIAGNOSIS — K91.2 POSTGASTRECTOMY MALABSORPTION: ICD-10-CM

## 2020-05-05 DIAGNOSIS — Z86.010 HISTORY OF COLON POLYPS: ICD-10-CM

## 2020-05-05 PROBLEM — E87.1 HYPONATREMIA: Status: ACTIVE | Noted: 2020-05-05

## 2020-05-05 PROBLEM — Z86.0100 HISTORY OF COLON POLYPS: Status: ACTIVE | Noted: 2020-05-05

## 2020-05-05 PROCEDURE — 99204 OFFICE O/P NEW MOD 45 MIN: CPT | Performed by: INTERNAL MEDICINE

## 2020-05-05 RX ORDER — OMEPRAZOLE 20 MG/1
20 CAPSULE, DELAYED RELEASE ORAL 2 TIMES DAILY
COMMUNITY
Start: 2020-04-27 | End: 2020-09-21 | Stop reason: SDUPTHER

## 2020-05-05 RX ORDER — BUPRENORPHINE 15 UG/H
1 PATCH TRANSDERMAL WEEKLY
COMMUNITY

## 2020-05-19 ENCOUNTER — TELEPHONE (OUTPATIENT)
Dept: GASTROENTEROLOGY | Facility: MEDICAL CENTER | Age: 60
End: 2020-05-19

## 2020-05-21 ENCOUNTER — OFFICE VISIT (OUTPATIENT)
Dept: FAMILY MEDICINE CLINIC | Facility: CLINIC | Age: 60
End: 2020-05-21
Payer: MEDICARE

## 2020-05-21 ENCOUNTER — CLINICAL SUPPORT (OUTPATIENT)
Dept: FAMILY MEDICINE CLINIC | Facility: CLINIC | Age: 60
End: 2020-05-21
Payer: MEDICARE

## 2020-05-21 VITALS
DIASTOLIC BLOOD PRESSURE: 80 MMHG | SYSTOLIC BLOOD PRESSURE: 138 MMHG | TEMPERATURE: 98.3 F | HEART RATE: 94 BPM | RESPIRATION RATE: 16 BRPM | HEIGHT: 64 IN | OXYGEN SATURATION: 97 % | BODY MASS INDEX: 41.79 KG/M2 | WEIGHT: 244.8 LBS

## 2020-05-21 DIAGNOSIS — E53.8 VITAMIN B 12 DEFICIENCY: Primary | ICD-10-CM

## 2020-05-21 DIAGNOSIS — Z12.39 BREAST CANCER SCREENING: ICD-10-CM

## 2020-05-21 DIAGNOSIS — L30.9 DERMATITIS: Primary | ICD-10-CM

## 2020-05-21 PROCEDURE — 1036F TOBACCO NON-USER: CPT | Performed by: PHYSICIAN ASSISTANT

## 2020-05-21 PROCEDURE — 99213 OFFICE O/P EST LOW 20 MIN: CPT | Performed by: PHYSICIAN ASSISTANT

## 2020-05-21 PROCEDURE — 3008F BODY MASS INDEX DOCD: CPT | Performed by: PHYSICIAN ASSISTANT

## 2020-05-21 RX ORDER — CYANOCOBALAMIN 1000 UG/ML
1000 INJECTION INTRAMUSCULAR; SUBCUTANEOUS
Status: DISCONTINUED | OUTPATIENT
Start: 2020-05-21 | End: 2021-04-15

## 2020-05-21 RX ADMIN — CYANOCOBALAMIN 1000 MCG: 1000 INJECTION INTRAMUSCULAR; SUBCUTANEOUS at 13:45

## 2020-06-08 ENCOUNTER — TELEPHONE (OUTPATIENT)
Dept: HEMATOLOGY ONCOLOGY | Facility: CLINIC | Age: 60
End: 2020-06-08

## 2020-06-09 ENCOUNTER — HOSPITAL ENCOUNTER (OUTPATIENT)
Dept: INFUSION CENTER | Facility: CLINIC | Age: 60
Discharge: HOME | End: 2020-06-09
Payer: MEDICARE

## 2020-06-09 VITALS
DIASTOLIC BLOOD PRESSURE: 83 MMHG | RESPIRATION RATE: 18 BRPM | SYSTOLIC BLOOD PRESSURE: 128 MMHG | TEMPERATURE: 97.8 F | HEART RATE: 89 BPM

## 2020-06-09 DIAGNOSIS — K91.2 POSTGASTRECTOMY MALABSORPTION: ICD-10-CM

## 2020-06-09 DIAGNOSIS — D50.8 IRON DEFICIENCY ANEMIA SECONDARY TO INADEQUATE DIETARY IRON INTAKE: Primary | ICD-10-CM

## 2020-06-09 DIAGNOSIS — Z90.3 POSTGASTRECTOMY MALABSORPTION: ICD-10-CM

## 2020-06-09 PROCEDURE — 96365 THER/PROPH/DIAG IV INF INIT: CPT

## 2020-06-09 RX ORDER — SODIUM CHLORIDE 9 MG/ML
20 INJECTION, SOLUTION INTRAVENOUS ONCE
Status: CANCELLED | OUTPATIENT
Start: 2020-12-09

## 2020-06-09 RX ORDER — SODIUM CHLORIDE 9 MG/ML
20 INJECTION, SOLUTION INTRAVENOUS ONCE
Status: COMPLETED | OUTPATIENT
Start: 2020-06-09 | End: 2020-06-09

## 2020-06-09 RX ADMIN — SODIUM CHLORIDE 20 ML/HR: 0.9 INJECTION, SOLUTION INTRAVENOUS at 13:49

## 2020-06-09 RX ADMIN — SODIUM CHLORIDE 200 MG: 9 INJECTION, SOLUTION INTRAVENOUS at 13:50

## 2020-06-24 ENCOUNTER — CLINICAL SUPPORT (OUTPATIENT)
Dept: FAMILY MEDICINE CLINIC | Facility: CLINIC | Age: 60
End: 2020-06-24
Payer: MEDICARE

## 2020-06-24 DIAGNOSIS — E53.8 VITAMIN B 12 DEFICIENCY: Primary | ICD-10-CM

## 2020-06-24 PROCEDURE — 96372 THER/PROPH/DIAG INJ SC/IM: CPT

## 2020-06-24 RX ORDER — CYANOCOBALAMIN 1000 UG/ML
1000 INJECTION INTRAMUSCULAR; SUBCUTANEOUS
Status: DISCONTINUED | OUTPATIENT
Start: 2020-06-24 | End: 2021-04-15

## 2020-06-24 RX ADMIN — CYANOCOBALAMIN 1000 MCG: 1000 INJECTION INTRAMUSCULAR; SUBCUTANEOUS at 10:35

## 2020-09-10 ENCOUNTER — TELEPHONE (OUTPATIENT)
Dept: FAMILY MEDICINE CLINIC | Facility: CLINIC | Age: 60
End: 2020-09-10

## 2020-09-10 NOTE — TELEPHONE ENCOUNTER
Please call and remind her to proceed with the fasting blood work orders that are in the system from March 17th  She also has a fit test ordered for colon cancer screening  She can drop off her kit at the laboratory when she has her blood work completed    Please ask her to do this in preparation for her appointment on Friday March 18th at 12:45 p m  thank you

## 2020-09-10 NOTE — TELEPHONE ENCOUNTER
Pc to pt to remind her to go for fasting bloodwork before her appt  She was also reminded to drop fit test at the lab

## 2020-09-17 ENCOUNTER — APPOINTMENT (OUTPATIENT)
Dept: LAB | Facility: IMAGING CENTER | Age: 60
End: 2020-09-17
Payer: MEDICARE

## 2020-09-17 LAB
ALBUMIN SERPL BCP-MCNC: 3.5 G/DL (ref 3.5–5)
ALP SERPL-CCNC: 127 U/L (ref 46–116)
ALT SERPL W P-5'-P-CCNC: 26 U/L (ref 12–78)
ANION GAP SERPL CALCULATED.3IONS-SCNC: 8 MMOL/L (ref 4–13)
AST SERPL W P-5'-P-CCNC: 22 U/L (ref 5–45)
BASOPHILS # BLD AUTO: 0.06 THOUSANDS/ΜL (ref 0–0.1)
BASOPHILS NFR BLD AUTO: 1 % (ref 0–1)
BILIRUB SERPL-MCNC: 0.43 MG/DL (ref 0.2–1)
BUN SERPL-MCNC: 7 MG/DL (ref 5–25)
CALCIUM SERPL-MCNC: 8.7 MG/DL (ref 8.3–10.1)
CHLORIDE SERPL-SCNC: 105 MMOL/L (ref 100–108)
CHOLEST SERPL-MCNC: 193 MG/DL (ref 50–200)
CO2 SERPL-SCNC: 25 MMOL/L (ref 21–32)
CREAT SERPL-MCNC: 0.76 MG/DL (ref 0.6–1.3)
EOSINOPHIL # BLD AUTO: 0.07 THOUSAND/ΜL (ref 0–0.61)
EOSINOPHIL NFR BLD AUTO: 1 % (ref 0–6)
ERYTHROCYTE [DISTWIDTH] IN BLOOD BY AUTOMATED COUNT: 14.6 % (ref 11.6–15.1)
FERRITIN SERPL-MCNC: 78 NG/ML (ref 8–388)
GFR SERPL CREATININE-BSD FRML MDRD: 86 ML/MIN/1.73SQ M
GLUCOSE P FAST SERPL-MCNC: 102 MG/DL (ref 65–99)
HCT VFR BLD AUTO: 36.8 % (ref 34.8–46.1)
HDLC SERPL-MCNC: 78 MG/DL
HGB BLD-MCNC: 12.2 G/DL (ref 11.5–15.4)
IMM GRANULOCYTES # BLD AUTO: 0.02 THOUSAND/UL (ref 0–0.2)
IMM GRANULOCYTES NFR BLD AUTO: 0 % (ref 0–2)
LDLC SERPL CALC-MCNC: 101 MG/DL (ref 0–100)
LYMPHOCYTES # BLD AUTO: 2.02 THOUSANDS/ΜL (ref 0.6–4.47)
LYMPHOCYTES NFR BLD AUTO: 38 % (ref 14–44)
MCH RBC QN AUTO: 28.8 PG (ref 26.8–34.3)
MCHC RBC AUTO-ENTMCNC: 33.2 G/DL (ref 31.4–37.4)
MCV RBC AUTO: 87 FL (ref 82–98)
MONOCYTES # BLD AUTO: 0.44 THOUSAND/ΜL (ref 0.17–1.22)
MONOCYTES NFR BLD AUTO: 8 % (ref 4–12)
NEUTROPHILS # BLD AUTO: 2.69 THOUSANDS/ΜL (ref 1.85–7.62)
NEUTS SEG NFR BLD AUTO: 52 % (ref 43–75)
NONHDLC SERPL-MCNC: 115 MG/DL
NRBC BLD AUTO-RTO: 0 /100 WBCS
PLATELET # BLD AUTO: 285 THOUSANDS/UL (ref 149–390)
PMV BLD AUTO: 9.4 FL (ref 8.9–12.7)
POTASSIUM SERPL-SCNC: 4.2 MMOL/L (ref 3.5–5.3)
PROT SERPL-MCNC: 6.6 G/DL (ref 6.4–8.2)
RBC # BLD AUTO: 4.23 MILLION/UL (ref 3.81–5.12)
SODIUM SERPL-SCNC: 138 MMOL/L (ref 136–145)
TRIGL SERPL-MCNC: 69 MG/DL
WBC # BLD AUTO: 5.3 THOUSAND/UL (ref 4.31–10.16)

## 2020-09-17 PROCEDURE — 85025 COMPLETE CBC W/AUTO DIFF WBC: CPT | Performed by: INTERNAL MEDICINE

## 2020-09-17 PROCEDURE — 80061 LIPID PANEL: CPT | Performed by: PHYSICIAN ASSISTANT

## 2020-09-17 PROCEDURE — 80053 COMPREHEN METABOLIC PANEL: CPT | Performed by: PHYSICIAN ASSISTANT

## 2020-09-17 PROCEDURE — 82728 ASSAY OF FERRITIN: CPT | Performed by: INTERNAL MEDICINE

## 2020-09-17 PROCEDURE — 36415 COLL VENOUS BLD VENIPUNCTURE: CPT | Performed by: PHYSICIAN ASSISTANT

## 2020-09-18 ENCOUNTER — OFFICE VISIT (OUTPATIENT)
Dept: FAMILY MEDICINE CLINIC | Facility: CLINIC | Age: 60
End: 2020-09-18
Payer: MEDICARE

## 2020-09-18 VITALS
OXYGEN SATURATION: 93 % | DIASTOLIC BLOOD PRESSURE: 76 MMHG | RESPIRATION RATE: 16 BRPM | BODY MASS INDEX: 43.23 KG/M2 | TEMPERATURE: 98.2 F | SYSTOLIC BLOOD PRESSURE: 120 MMHG | HEIGHT: 64 IN | HEART RATE: 82 BPM | WEIGHT: 253.25 LBS

## 2020-09-18 DIAGNOSIS — R06.83 SNORING: ICD-10-CM

## 2020-09-18 DIAGNOSIS — F31.61 BIPOLAR DISORDER, CURRENT EPISODE MIXED, MILD (HCC): ICD-10-CM

## 2020-09-18 DIAGNOSIS — E66.01 MORBID OBESITY WITH BMI OF 40.0-44.9, ADULT (HCC): ICD-10-CM

## 2020-09-18 DIAGNOSIS — R53.82 CHRONIC FATIGUE: ICD-10-CM

## 2020-09-18 DIAGNOSIS — M25.562 CHRONIC PAIN OF LEFT KNEE: ICD-10-CM

## 2020-09-18 DIAGNOSIS — M17.12 LOCALIZED OSTEOARTHRITIS OF LEFT KNEE: ICD-10-CM

## 2020-09-18 DIAGNOSIS — M54.40 CHRONIC BILATERAL LOW BACK PAIN WITH SCIATICA, SCIATICA LATERALITY UNSPECIFIED: ICD-10-CM

## 2020-09-18 DIAGNOSIS — K21.9 GERD WITHOUT ESOPHAGITIS: Primary | ICD-10-CM

## 2020-09-18 DIAGNOSIS — G89.29 CHRONIC PAIN OF LEFT KNEE: ICD-10-CM

## 2020-09-18 DIAGNOSIS — Z90.3 POSTGASTRECTOMY MALABSORPTION: ICD-10-CM

## 2020-09-18 DIAGNOSIS — G89.29 CHRONIC BILATERAL LOW BACK PAIN WITH SCIATICA, SCIATICA LATERALITY UNSPECIFIED: ICD-10-CM

## 2020-09-18 DIAGNOSIS — K91.2 POSTGASTRECTOMY MALABSORPTION: ICD-10-CM

## 2020-09-18 PROBLEM — M54.42 CHRONIC BILATERAL LOW BACK PAIN WITH SCIATICA: Status: ACTIVE | Noted: 2020-09-18

## 2020-09-18 PROBLEM — M54.41 CHRONIC BILATERAL LOW BACK PAIN WITH SCIATICA: Status: ACTIVE | Noted: 2020-09-18

## 2020-09-18 PROCEDURE — 99214 OFFICE O/P EST MOD 30 MIN: CPT | Performed by: PHYSICIAN ASSISTANT

## 2020-09-18 PROCEDURE — 96372 THER/PROPH/DIAG INJ SC/IM: CPT

## 2020-09-18 RX ORDER — TRAMADOL HYDROCHLORIDE 50 MG/1
TABLET ORAL
Qty: 30 TABLET | Refills: 0 | Status: SHIPPED | OUTPATIENT
Start: 2020-09-18

## 2020-09-18 RX ORDER — CYANOCOBALAMIN 1000 UG/ML
1000 INJECTION INTRAMUSCULAR; SUBCUTANEOUS
Status: DISCONTINUED | OUTPATIENT
Start: 2020-09-18 | End: 2021-04-15

## 2020-09-18 RX ADMIN — CYANOCOBALAMIN 1000 MCG: 1000 INJECTION INTRAMUSCULAR; SUBCUTANEOUS at 13:52

## 2020-09-21 DIAGNOSIS — K21.9 GERD WITHOUT ESOPHAGITIS: Primary | ICD-10-CM

## 2020-09-24 NOTE — TELEPHONE ENCOUNTER
Please verify the directions/frequency she is taking the medication  It states in the orders twice daily although it is only for 90 tablets  It states it was last given by a historical provider  Please advise her that long-term use of this medication can cause as kidney problems, heart problems, osteoporosis if taking on a daily basis  These risks or even increased with higher doses

## 2020-09-28 NOTE — TELEPHONE ENCOUNTER
I called pt she takes it twice daily sometimes if she needs it but normally once daily   She was made aware of the long term effects using it twice daily as you advised

## 2020-09-29 RX ORDER — OMEPRAZOLE 20 MG/1
20 CAPSULE, DELAYED RELEASE ORAL DAILY
Qty: 90 CAPSULE | Refills: 0 | Status: SHIPPED | OUTPATIENT
Start: 2020-09-29 | End: 2021-01-07 | Stop reason: SDUPTHER

## 2020-10-02 DIAGNOSIS — J30.1 SEASONAL ALLERGIC RHINITIS DUE TO POLLEN: ICD-10-CM

## 2020-10-02 RX ORDER — LEVOCETIRIZINE DIHYDROCHLORIDE 5 MG/1
5 TABLET, FILM COATED ORAL EVERY EVENING
Qty: 90 TABLET | Refills: 0 | Status: SHIPPED | OUTPATIENT
Start: 2020-10-02 | End: 2021-01-07 | Stop reason: SDUPTHER

## 2020-10-15 DIAGNOSIS — L30.9 DERMATITIS: ICD-10-CM

## 2020-10-15 RX ORDER — CLOTRIMAZOLE AND BETAMETHASONE DIPROPIONATE 10; .64 MG/G; MG/G
CREAM TOPICAL 2 TIMES DAILY
Qty: 30 G | Refills: 0 | Status: SHIPPED | OUTPATIENT
Start: 2020-10-15 | End: 2021-10-19 | Stop reason: SDUPTHER

## 2020-10-20 ENCOUNTER — CLINICAL SUPPORT (OUTPATIENT)
Dept: FAMILY MEDICINE CLINIC | Facility: CLINIC | Age: 60
End: 2020-10-20
Payer: MEDICARE

## 2020-10-20 DIAGNOSIS — Z23 IMMUNIZATION DUE: ICD-10-CM

## 2020-10-20 DIAGNOSIS — E53.8 VITAMIN B 12 DEFICIENCY: Primary | ICD-10-CM

## 2020-10-20 PROCEDURE — 96372 THER/PROPH/DIAG INJ SC/IM: CPT

## 2020-10-20 PROCEDURE — 90682 RIV4 VACC RECOMBINANT DNA IM: CPT

## 2020-10-20 PROCEDURE — G0008 ADMIN INFLUENZA VIRUS VAC: HCPCS

## 2020-10-20 RX ORDER — CYANOCOBALAMIN 1000 UG/ML
1000 INJECTION INTRAMUSCULAR; SUBCUTANEOUS
Status: DISCONTINUED | OUTPATIENT
Start: 2020-10-20 | End: 2021-04-15

## 2020-10-20 RX ADMIN — CYANOCOBALAMIN 1000 MCG: 1000 INJECTION INTRAMUSCULAR; SUBCUTANEOUS at 14:25

## 2020-11-12 ENCOUNTER — CLINICAL SUPPORT (OUTPATIENT)
Dept: FAMILY MEDICINE CLINIC | Facility: CLINIC | Age: 60
End: 2020-11-12
Payer: MEDICARE

## 2020-11-12 DIAGNOSIS — E53.8 VITAMIN B 12 DEFICIENCY: Primary | ICD-10-CM

## 2020-11-12 PROCEDURE — 96372 THER/PROPH/DIAG INJ SC/IM: CPT

## 2020-11-12 RX ORDER — CYANOCOBALAMIN 1000 UG/ML
1000 INJECTION INTRAMUSCULAR; SUBCUTANEOUS
Status: DISCONTINUED | OUTPATIENT
Start: 2020-11-12 | End: 2021-04-15

## 2020-11-12 RX ADMIN — CYANOCOBALAMIN 1000 MCG: 1000 INJECTION INTRAMUSCULAR; SUBCUTANEOUS at 15:32

## 2020-11-30 ENCOUNTER — OFFICE VISIT (OUTPATIENT)
Dept: FAMILY MEDICINE CLINIC | Facility: CLINIC | Age: 60
End: 2020-11-30
Payer: MEDICARE

## 2020-11-30 VITALS
HEART RATE: 88 BPM | DIASTOLIC BLOOD PRESSURE: 78 MMHG | RESPIRATION RATE: 16 BRPM | TEMPERATURE: 97.6 F | BODY MASS INDEX: 44.22 KG/M2 | WEIGHT: 259 LBS | SYSTOLIC BLOOD PRESSURE: 126 MMHG | HEIGHT: 64 IN

## 2020-11-30 DIAGNOSIS — M79.605 LEFT LEG PAIN: Primary | ICD-10-CM

## 2020-11-30 PROCEDURE — 99213 OFFICE O/P EST LOW 20 MIN: CPT | Performed by: FAMILY MEDICINE

## 2020-11-30 RX ORDER — PREDNISONE 50 MG/1
50 TABLET ORAL DAILY
Qty: 5 TABLET | Refills: 0 | Status: SHIPPED | OUTPATIENT
Start: 2020-11-30 | End: 2020-12-05

## 2020-12-04 DIAGNOSIS — K91.2 POSTGASTRECTOMY MALABSORPTION: Primary | ICD-10-CM

## 2020-12-04 DIAGNOSIS — Z90.3 POSTGASTRECTOMY MALABSORPTION: Primary | ICD-10-CM

## 2020-12-04 DIAGNOSIS — D50.8 IRON DEFICIENCY ANEMIA SECONDARY TO INADEQUATE DIETARY IRON INTAKE: ICD-10-CM

## 2020-12-09 ENCOUNTER — HOSPITAL ENCOUNTER (OUTPATIENT)
Dept: INFUSION CENTER | Facility: CLINIC | Age: 60
Discharge: HOME | End: 2020-12-09
Payer: MEDICARE

## 2020-12-09 VITALS
DIASTOLIC BLOOD PRESSURE: 86 MMHG | TEMPERATURE: 97.9 F | SYSTOLIC BLOOD PRESSURE: 138 MMHG | RESPIRATION RATE: 18 BRPM | HEART RATE: 98 BPM

## 2020-12-09 DIAGNOSIS — D50.8 IRON DEFICIENCY ANEMIA SECONDARY TO INADEQUATE DIETARY IRON INTAKE: Primary | ICD-10-CM

## 2020-12-09 DIAGNOSIS — K91.2 POSTGASTRECTOMY MALABSORPTION: ICD-10-CM

## 2020-12-09 DIAGNOSIS — Z90.3 POSTGASTRECTOMY MALABSORPTION: ICD-10-CM

## 2020-12-09 LAB
BASOPHILS # BLD AUTO: 0.03 THOUSANDS/ΜL (ref 0–0.1)
BASOPHILS NFR BLD AUTO: 0 % (ref 0–1)
EOSINOPHIL # BLD AUTO: 0.16 THOUSAND/ΜL (ref 0–0.61)
EOSINOPHIL NFR BLD AUTO: 2 % (ref 0–6)
ERYTHROCYTE [DISTWIDTH] IN BLOOD BY AUTOMATED COUNT: 15.4 % (ref 11.6–15.1)
FERRITIN SERPL-MCNC: 63 NG/ML (ref 8–388)
HCT VFR BLD AUTO: 37.8 % (ref 34.8–46.1)
HGB BLD-MCNC: 12.2 G/DL (ref 11.5–15.4)
LYMPHOCYTES # BLD AUTO: 2.68 THOUSANDS/ΜL (ref 0.6–4.47)
LYMPHOCYTES NFR BLD AUTO: 30 % (ref 14–44)
MCH RBC QN AUTO: 28.6 PG (ref 26.8–34.3)
MCHC RBC AUTO-ENTMCNC: 32.3 G/DL (ref 31.4–37.4)
MCV RBC AUTO: 89 FL (ref 82–98)
MONOCYTES # BLD AUTO: 0.82 THOUSAND/ΜL (ref 0.17–1.22)
MONOCYTES NFR BLD AUTO: 9 % (ref 4–12)
NEUTROPHILS # BLD AUTO: 5.3 THOUSANDS/ΜL (ref 1.85–7.62)
NEUTS SEG NFR BLD AUTO: 59 % (ref 43–75)
PLATELET # BLD AUTO: 314 THOUSANDS/UL (ref 149–390)
PMV BLD AUTO: 9.3 FL (ref 8.9–12.7)
RBC # BLD AUTO: 4.27 MILLION/UL (ref 3.81–5.12)
WBC # BLD AUTO: 8.99 THOUSAND/UL (ref 4.31–10.16)

## 2020-12-09 PROCEDURE — 82728 ASSAY OF FERRITIN: CPT

## 2020-12-09 PROCEDURE — 96365 THER/PROPH/DIAG IV INF INIT: CPT

## 2020-12-09 PROCEDURE — 85025 COMPLETE CBC W/AUTO DIFF WBC: CPT

## 2020-12-09 RX ORDER — SODIUM CHLORIDE 9 MG/ML
20 INJECTION, SOLUTION INTRAVENOUS ONCE
Status: COMPLETED | OUTPATIENT
Start: 2020-12-09 | End: 2020-12-09

## 2020-12-09 RX ORDER — SODIUM CHLORIDE 9 MG/ML
20 INJECTION, SOLUTION INTRAVENOUS ONCE
Status: CANCELLED | OUTPATIENT
Start: 2021-06-09

## 2020-12-09 RX ADMIN — SODIUM CHLORIDE 200 MG: 9 INJECTION, SOLUTION INTRAVENOUS at 13:43

## 2020-12-09 RX ADMIN — SODIUM CHLORIDE 20 ML/HR: 0.9 INJECTION, SOLUTION INTRAVENOUS at 13:43

## 2020-12-21 ENCOUNTER — CLINICAL SUPPORT (OUTPATIENT)
Dept: FAMILY MEDICINE CLINIC | Facility: CLINIC | Age: 60
End: 2020-12-21
Payer: MEDICARE

## 2020-12-21 DIAGNOSIS — E53.8 VITAMIN B 12 DEFICIENCY: Primary | ICD-10-CM

## 2020-12-21 PROCEDURE — 96372 THER/PROPH/DIAG INJ SC/IM: CPT

## 2020-12-21 RX ORDER — CYANOCOBALAMIN 1000 UG/ML
1000 INJECTION INTRAMUSCULAR; SUBCUTANEOUS
Status: DISCONTINUED | OUTPATIENT
Start: 2020-12-21 | End: 2021-04-15

## 2020-12-21 RX ADMIN — CYANOCOBALAMIN 1000 MCG: 1000 INJECTION INTRAMUSCULAR; SUBCUTANEOUS at 13:47

## 2020-12-31 ENCOUNTER — TELEPHONE (OUTPATIENT)
Dept: FAMILY MEDICINE CLINIC | Facility: CLINIC | Age: 60
End: 2020-12-31

## 2020-12-31 NOTE — TELEPHONE ENCOUNTER
Pt cologuard results came in and scanned under media   She is aware results were negative and I will send to [facility] to update chart

## 2021-01-06 ENCOUNTER — OFFICE VISIT (OUTPATIENT)
Dept: FAMILY MEDICINE CLINIC | Facility: CLINIC | Age: 61
End: 2021-01-06
Payer: MEDICARE

## 2021-01-06 VITALS
RESPIRATION RATE: 16 BRPM | DIASTOLIC BLOOD PRESSURE: 78 MMHG | WEIGHT: 258 LBS | HEART RATE: 100 BPM | HEIGHT: 64 IN | OXYGEN SATURATION: 94 % | BODY MASS INDEX: 44.05 KG/M2 | SYSTOLIC BLOOD PRESSURE: 124 MMHG | TEMPERATURE: 97.8 F

## 2021-01-06 DIAGNOSIS — D50.8 IRON DEFICIENCY ANEMIA SECONDARY TO INADEQUATE DIETARY IRON INTAKE: ICD-10-CM

## 2021-01-06 DIAGNOSIS — E78.2 MIXED HYPERLIPIDEMIA: ICD-10-CM

## 2021-01-06 DIAGNOSIS — F31.61 BIPOLAR DISORDER, CURRENT EPISODE MIXED, MILD (HCC): ICD-10-CM

## 2021-01-06 DIAGNOSIS — K21.9 GERD WITHOUT ESOPHAGITIS: Primary | ICD-10-CM

## 2021-01-06 DIAGNOSIS — M25.562 CHRONIC PAIN OF LEFT KNEE: ICD-10-CM

## 2021-01-06 DIAGNOSIS — Z96.652 HISTORY OF TOTAL KNEE REPLACEMENT, LEFT: ICD-10-CM

## 2021-01-06 DIAGNOSIS — E66.01 MORBID OBESITY WITH BMI OF 40.0-44.9, ADULT (HCC): ICD-10-CM

## 2021-01-06 DIAGNOSIS — G89.29 CHRONIC PAIN OF LEFT KNEE: ICD-10-CM

## 2021-01-06 DIAGNOSIS — M79.652 LEFT THIGH PAIN: ICD-10-CM

## 2021-01-06 DIAGNOSIS — R53.82 CHRONIC FATIGUE: ICD-10-CM

## 2021-01-06 PROBLEM — M17.12 LOCALIZED OSTEOARTHRITIS OF LEFT KNEE: Status: RESOLVED | Noted: 2020-01-22 | Resolved: 2021-01-06

## 2021-01-06 PROBLEM — E87.1 HYPONATREMIA: Status: RESOLVED | Noted: 2020-05-05 | Resolved: 2021-01-06

## 2021-01-06 PROCEDURE — 99214 OFFICE O/P EST MOD 30 MIN: CPT | Performed by: PHYSICIAN ASSISTANT

## 2021-01-06 NOTE — PROGRESS NOTES
Assessment/Plan:      -I did recommend physical therapy for her left eye T band pain along with her left knee pain  She is almost 1 year post left total knee replacement  I did advise her that if the therapy does not improve her pain I would recommend she go back to [facility]  For re-evaluation  - I did advise her to check her foot daily and if she does notice any redness or drainage recommend follow-up   - I did recommend proceeding with fasting labs   -refer to Sleep Medicine to rule out sleep apnea   - she states that she did see Dr Viktoria Moya for weight loss but he could not provide any further treatment since she is already on Wellbutrin  -continue follow-up with Psychiatry as advised   - recommend follow-up in May and proceed with Medicare wellness at that time also for 30 minutes appointment    BMI Counseling: Body mass index is 44 29 kg/m²  The BMI is above normal  Nutrition recommendations include reducing portion sizes and decreasing overall calorie intake  Exercise recommendations include exercising 3-5 times per week  [employer] software was used to dictate this note  It may contain errors with dictating incorrect words/spelling  Please contact provider directly for any questions  Diagnoses and all orders for this visit:    GERD without esophagitis  -     Comprehensive metabolic panel  -     Lipid panel  -     CBC and differential    Iron deficiency anemia secondary to inadequate dietary iron intake  -     Comprehensive metabolic panel  -     Lipid panel  -     CBC and differential    Bipolar disorder, current episode mixed, mild (HCC)  -     Comprehensive metabolic panel  -     Lipid panel  -     CBC and differential    Morbid obesity with BMI of 40 0-44 9, adult (HCC)  -     Comprehensive metabolic panel  -     Lipid panel  -     CBC and differential  -     Ambulatory referral to Sleep Medicine;  Future    Mixed hyperlipidemia  -     Comprehensive metabolic panel  -     Lipid panel  -     CBC and differential    History of total knee replacement, left  -     Ambulatory referral to Physical Therapy; Future    Chronic pain of left knee  -     Ambulatory referral to Physical Therapy; Future    Left thigh pain  -     Ambulatory referral to Physical Therapy; Future    Chronic fatigue  -     Ambulatory referral to Sleep Medicine; Future          Subjective:      Patient ID: Lynne Kirshna is a 63 y o  female  Patient presents today for routine follow-up for reflux, iron deficiency anemia, chronic fatigue  She states that she may have a foreign body on the bottom of her right foot  She is not sure if she removed it on her own  She did notice a small lump  She is not having any pain at this time  She denies any fever, chills, discharge  She states that she does feel tired all the time  She states years ago she was tested for sleep apnea but was negative  She has gained more weight since that time  She does follow with Psychiatry for bipolar  She does take Seroquel and does feel tired upon awakening in the morning  She also states that she had an IV infusion of iron at the cancer center but she was not happy because she had quite a bit of swelling of her arm after the infusion  She does not want to go back to that center  She is also complaining of pain down the left lateral thigh region  She has noticed this pain ever since she had a total knee replacement in January 2020 by Dr Antonio Smith  At [facility]  She does not have any associated back pain  She denies any numbness or tingling  She does have pain when she lays on her left side        The following portions of the patient's history were reviewed and updated as appropriate:   She  has a past medical history of Bipolar affective disorder, depressed ([facility] [address] ), Colon polyp, GERD (gastroesophageal reflux disease), Heart murmur, Osteoarthritis, Psychiatric disorder, Subdural hematoma ([facility] [address] ), and Vitamin B12 deficiency  She   Patient Active Problem List    Diagnosis Date Noted    History of total knee replacement, left 01/06/2021    Left thigh pain 01/06/2021    Left leg pain 11/30/2020    Chronic bilateral low back pain with sciatica 09/18/2020    Chronic fatigue 09/18/2020    Snoring 09/18/2020    Breast cancer screening 05/21/2020    History of colon polyps 05/05/2020    Morbid obesity with BMI of 40 0-44 9, adult ([facility] [address] ) 03/17/2020    Medicare annual wellness visit, subsequent 03/17/2020    Mixed hyperlipidemia 03/17/2020    Seasonal allergic rhinitis due to pollen 01/23/2020    History of subarachnoid hemorrhage 01/22/2020    Stress incontinence 09/10/2019    Bipolar disorder, current episode mixed, mild ([facility] [address] ) 09/10/2019    Screening for breast cancer 08/23/2019    Screening for colon cancer 08/23/2019    Yeast infection of the skin 08/23/2019    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    Iron deficiency anemia secondary to inadequate dietary iron intake 05/17/2019    Dermatitis 05/06/2019    Pre-op examination 05/06/2019    Chronic pain of left knee 05/06/2019    Cervical spondylosis 01/09/2019    Cervical stenosis of spinal canal 12/13/2018    Arthropathy 10/03/2012    Neck pain 10/03/2012    GERD without esophagitis 10/03/2012    Insomnia 10/03/2012    Vitamin B-complex deficiency 10/03/2012     She  has a past surgical history that includes Knee surgery (Right, 01/30/2020); Cervical spine surgery (2002); Gastric bypass (2004); Foot fracture surgery (Right, 2012); Foot surgery (Left); Cholecystectomy (05/1985); Hysterectomy; and Breast lumpectomy (Right)    Her family history includes Breast cancer in her maternal grandfather, maternal grandmother, paternal aunt, paternal aunt, paternal grandfather, and paternal grandmother; Breast cancer (age of onset: 77) in her half-sister; Colon cancer in her maternal uncle; Coronary artery disease in her family; Diabetes in her father; Heart attack in her family; Heart disease in her father  She  reports that she has never smoked  She has never used smokeless tobacco  She reports current alcohol use  She reports that she does not use drugs    Current Outpatient Medications   Medication Sig Dispense Refill    acetaminophen (TYLENOL) 650 mg CR tablet 1 as directed for pain      ARIPiprazole (ABILIFY) 10 mg tablet       buprenorphine (Butrans) 15 mcg/hr Place 1 patch on the skin once a week      buPROPion (WELLBUTRIN) 100 mg tablet Take 200 mg by mouth 2 (two) times a day       busPIRone (BUSPAR) 15 mg tablet Take 15 mg by mouth 2 (two) times a day      clotrimazole-betamethasone (LOTRISONE) 1-0 05 % cream Apply topically 2 (two) times a day 30 g 0    DULoxetine (CYMBALTA) 60 mg delayed release capsule Take 120 mg by mouth daily       gabapentin (NEURONTIN) 800 mg tablet Take 1,600 mg by mouth daily at bedtime   0    hydrOXYzine HCL (ATARAX) 50 mg tablet Take 50 mg by mouth daily at bedtime  0    levocetirizine (XYZAL) 5 MG tablet Take 1 tablet (5 mg total) by mouth every evening 90 tablet 0    nystatin (MYCOSTATIN) cream Apply topically 2 (two) times a day 30 g 2    omeprazole (PriLOSEC) 20 mg delayed release capsule Take 1 capsule (20 mg total) by mouth daily 90 capsule 0    QUEtiapine (SEROquel) 400 MG tablet Take 800 mg by mouth daily at bedtime  0    tiZANidine (ZANAFLEX) 4 mg tablet Take 4 mg by mouth 3 (three) times a day  0    traMADol (ULTRAM) 50 mg tablet Take 1 tablet daily as needed 30 tablet 0    oxyCODONE-acetaminophen (PERCOCET) 5-325 mg per tablet Take 1 tablet by mouth every 6 (six) hours as needed      polyethylene glycol (COLYTE) 4000 mL solution Take 240 g by mouth once for 1 dose 4000 mL 0     Current Facility-Administered Medications   Medication Dose Route Frequency Provider Last Rate Last Admin    cyanocobalamin injection 1,000 mcg  1,000 mcg Intramuscular Q30 Days Ryley Boucher MD   1,000 mcg at 09/20/18 5823    cyanocobalamin injection 1,000 mcg  1,000 mcg Intramuscular Q30 Days Kristina Bird MD   1,000 mcg at 11/06/18 0853    cyanocobalamin injection 1,000 mcg  1,000 mcg Intramuscular Q30 Days Kristina Bird MD   1,000 mcg at 12/06/18 1631    cyanocobalamin injection 1,000 mcg  1,000 mcg Intramuscular Q30 Days Ting Galeano MD   1,000 mcg at 01/07/19 1443    cyanocobalamin injection 1,000 mcg  1,000 mcg Intramuscular Q30 Days Ting Galeano MD   1,000 mcg at 03/08/19 1501    cyanocobalamin injection 1,000 mcg  1,000 mcg Intramuscular Q30 Days WHITNEY Hwang   1,000 mcg at 03/11/19 1644    cyanocobalamin injection 1,000 mcg  1,000 mcg Intramuscular Q30 Days Ting Galeano MD   1,000 mcg at 04/08/19 1317    cyanocobalamin injection 1,000 mcg  1,000 mcg Intramuscular Q30 Days Ting Galeano MD   1,000 mcg at 07/01/19 1529    cyanocobalamin injection 1,000 mcg  1,000 mcg Intramuscular Q30 Days Ting Galeano MD   1,000 mcg at 08/02/19 1311    cyanocobalamin injection 1,000 mcg  1,000 mcg Intramuscular Q30 Days Ting Galeano MD   1,000 mcg at 09/03/19 1313    cyanocobalamin injection 1,000 mcg  1,000 mcg Intramuscular Q30 Days Clinton Wallis MD   1,000 mcg at 10/07/19 1414    cyanocobalamin injection 1,000 mcg  1,000 mcg Intramuscular Q30 Days Clinton Wallis MD   1,000 mcg at 11/21/19 1232    cyanocobalamin injection 1,000 mcg  1,000 mcg Intramuscular Q30 Days Ting Galeano MD   1,000 mcg at 12/23/19 1214    cyanocobalamin injection 1,000 mcg  1,000 mcg Intramuscular Q30 Days Franci Vergara PA-C   1,000 mcg at 01/23/20 1353    cyanocobalamin injection 1,000 mcg  1,000 mcg Intramuscular Q30 Days Franci Vergara PA-C   1,000 mcg at 03/17/20 1145    cyanocobalamin injection 1,000 mcg  1,000 mcg Intramuscular Q30 Days Franci Vergara PA-C   1,000 mcg at 04/14/20 1338    cyanocobalamin injection 1,000 mcg  1,000 mcg Intramuscular Q30 Days Franci Vergara PA-C   1,000 mcg at 05/21/20 1345    cyanocobalamin injection 1,000 mcg  1,000 mcg Intramuscular Q30 Days Aleksandra Alarcon MD   1,000 mcg at 06/24/20 1035    cyanocobalamin injection 1,000 mcg  1,000 mcg Intramuscular Q30 Days Franci Vergara PA-C   1,000 mcg at 09/18/20 1352    cyanocobalamin injection 1,000 mcg  1,000 mcg Intramuscular Q30 Days Franci Vergara PA-C   1,000 mcg at 10/20/20 1425    cyanocobalamin injection 1,000 mcg  1,000 mcg Intramuscular Q30 Days Aleksandra Alarcon MD   1,000 mcg at 11/12/20 1532    cyanocobalamin injection 1,000 mcg  1,000 mcg Intramuscular Q30 Days Aleksandra Alarcon MD   1,000 mcg at 12/21/20 1347     Current Outpatient Medications on File Prior to Visit   Medication Sig    acetaminophen (TYLENOL) 650 mg CR tablet 1 as directed for pain    ARIPiprazole (ABILIFY) 10 mg tablet     buprenorphine (Butrans) 15 mcg/hr Place 1 patch on the skin once a week    buPROPion (WELLBUTRIN) 100 mg tablet Take 200 mg by mouth 2 (two) times a day     busPIRone (BUSPAR) 15 mg tablet Take 15 mg by mouth 2 (two) times a day    clotrimazole-betamethasone (LOTRISONE) 1-0 05 % cream Apply topically 2 (two) times a day    DULoxetine (CYMBALTA) 60 mg delayed release capsule Take 120 mg by mouth daily     gabapentin (NEURONTIN) 800 mg tablet Take 1,600 mg by mouth daily at bedtime     hydrOXYzine HCL (ATARAX) 50 mg tablet Take 50 mg by mouth daily at bedtime    levocetirizine (XYZAL) 5 MG tablet Take 1 tablet (5 mg total) by mouth every evening    nystatin (MYCOSTATIN) cream Apply topically 2 (two) times a day    omeprazole (PriLOSEC) 20 mg delayed release capsule Take 1 capsule (20 mg total) by mouth daily    QUEtiapine (SEROquel) 400 MG tablet Take 800 mg by mouth daily at bedtime    tiZANidine (ZANAFLEX) 4 mg tablet Take 4 mg by mouth 3 (three) times a day    traMADol (ULTRAM) 50 mg tablet Take 1 tablet daily as needed    oxyCODONE-acetaminophen (PERCOCET) 5-325 mg per tablet Take 1 tablet by mouth every 6 (six) hours as needed    polyethylene glycol (COLYTE) 4000 mL solution Take 240 g by mouth once for 1 dose     Current Facility-Administered Medications on File Prior to Visit   Medication    cyanocobalamin injection 1,000 mcg    cyanocobalamin injection 1,000 mcg    cyanocobalamin injection 1,000 mcg    cyanocobalamin injection 1,000 mcg    cyanocobalamin injection 1,000 mcg    cyanocobalamin injection 1,000 mcg    cyanocobalamin injection 1,000 mcg    cyanocobalamin injection 1,000 mcg    cyanocobalamin injection 1,000 mcg    cyanocobalamin injection 1,000 mcg    cyanocobalamin injection 1,000 mcg    cyanocobalamin injection 1,000 mcg    cyanocobalamin injection 1,000 mcg    cyanocobalamin injection 1,000 mcg    cyanocobalamin injection 1,000 mcg    cyanocobalamin injection 1,000 mcg    cyanocobalamin injection 1,000 mcg    cyanocobalamin injection 1,000 mcg    cyanocobalamin injection 1,000 mcg    cyanocobalamin injection 1,000 mcg    cyanocobalamin injection 1,000 mcg    cyanocobalamin injection 1,000 mcg     She is allergic to prochlorperazine; rofecoxib; and erythromycin base       Review of Systems   Constitutional: Positive for fatigue  Negative for chills and fever  Respiratory: Negative for cough and shortness of breath  Musculoskeletal:          As stated in HPI   Skin:         As stated in HPI   Psychiatric/Behavioral:         As stated in HPI         Objective:      /78 (BP Location: Left arm, Patient Position: Sitting, Cuff Size: Large)   Pulse 100   Temp 97 8 °F (36 6 °C) (Tympanic)   Resp 16   Ht 5' 4" (1 626 m)   Wt 117 kg (258 lb)   SpO2 94%   BMI 44 29 kg/m²          Physical Exam  Constitutional:       General: She is not in acute distress  Appearance: Normal appearance  She is well-developed  She is obese  She is not ill-appearing, toxic-appearing or diaphoretic  HENT:      Head: Normocephalic and atraumatic        Right Ear: Tympanic membrane, ear canal and external ear normal       Left Ear: Tympanic membrane, ear canal and external ear normal    Neck:      Musculoskeletal: Neck supple  Thyroid: No thyromegaly  Cardiovascular:      Rate and Rhythm: Normal rate and regular rhythm  Heart sounds: Normal heart sounds  No murmur  No friction rub  No gallop  Pulmonary:      Effort: Pulmonary effort is normal  No respiratory distress  Breath sounds: Normal breath sounds  No wheezing or rales  Abdominal:      General: Bowel sounds are normal       Palpations: Abdomen is soft  There is no mass  Tenderness: There is no abdominal tenderness  Musculoskeletal:         General: No deformity  Comments:   Left leg: She does have some mild tenderness over the IT  band  Good range of motion of her hip  Mild pain with range of motion of the knee  Lymphadenopathy:      Cervical: No cervical adenopathy  Skin:     General: Skin is warm  Comments:  Right plantar foot: I did notice a probable small hematoma which is about 4 x 2 mm in the center of her foot  There is no tenderness with palpation  There is no erythema or discharge  No obvious foreign body  Neurological:      General: No focal deficit present  Mental Status: She is alert     Psychiatric:         Mood and Affect: Mood normal

## 2021-01-07 DIAGNOSIS — J30.1 SEASONAL ALLERGIC RHINITIS DUE TO POLLEN: ICD-10-CM

## 2021-01-07 DIAGNOSIS — K21.9 GERD WITHOUT ESOPHAGITIS: ICD-10-CM

## 2021-01-07 RX ORDER — OMEPRAZOLE 20 MG/1
20 CAPSULE, DELAYED RELEASE ORAL DAILY
Qty: 90 CAPSULE | Refills: 0 | Status: SHIPPED | OUTPATIENT
Start: 2021-01-07 | End: 2021-04-29 | Stop reason: SDUPTHER

## 2021-01-07 RX ORDER — LEVOCETIRIZINE DIHYDROCHLORIDE 5 MG/1
5 TABLET, FILM COATED ORAL EVERY EVENING
Qty: 90 TABLET | Refills: 0 | Status: SHIPPED | OUTPATIENT
Start: 2021-01-07 | End: 2021-04-02 | Stop reason: SDUPTHER

## 2021-01-07 NOTE — TELEPHONE ENCOUNTER
Pt said she requested these 2 weeks ago?     Xyzal and Omeprazole    Last here 1/6/21  Next appt 5/18/21

## 2021-02-23 ENCOUNTER — CLINICAL SUPPORT (OUTPATIENT)
Dept: FAMILY MEDICINE CLINIC | Facility: CLINIC | Age: 61
End: 2021-02-23
Payer: MEDICARE

## 2021-02-23 DIAGNOSIS — E53.8 VITAMIN B 12 DEFICIENCY: Primary | ICD-10-CM

## 2021-02-23 PROCEDURE — 96372 THER/PROPH/DIAG INJ SC/IM: CPT

## 2021-02-23 RX ORDER — CYANOCOBALAMIN 1000 UG/ML
1000 INJECTION INTRAMUSCULAR; SUBCUTANEOUS
Status: DISCONTINUED | OUTPATIENT
Start: 2021-02-23 | End: 2021-04-15

## 2021-02-23 RX ADMIN — CYANOCOBALAMIN 1000 MCG: 1000 INJECTION INTRAMUSCULAR; SUBCUTANEOUS at 13:22

## 2021-03-15 ENCOUNTER — CLINICAL SUPPORT (OUTPATIENT)
Dept: FAMILY MEDICINE CLINIC | Facility: CLINIC | Age: 61
End: 2021-03-15
Payer: MEDICARE

## 2021-03-15 DIAGNOSIS — E53.8 VITAMIN B 12 DEFICIENCY: Primary | ICD-10-CM

## 2021-03-15 PROCEDURE — 96372 THER/PROPH/DIAG INJ SC/IM: CPT

## 2021-03-15 RX ORDER — CYANOCOBALAMIN 1000 UG/ML
1000 INJECTION INTRAMUSCULAR; SUBCUTANEOUS
Status: DISCONTINUED | OUTPATIENT
Start: 2021-03-15 | End: 2021-04-15

## 2021-03-15 RX ADMIN — CYANOCOBALAMIN 1000 MCG: 1000 INJECTION INTRAMUSCULAR; SUBCUTANEOUS at 15:02

## 2021-04-02 DIAGNOSIS — J30.1 SEASONAL ALLERGIC RHINITIS DUE TO POLLEN: ICD-10-CM

## 2021-04-02 RX ORDER — LEVOCETIRIZINE DIHYDROCHLORIDE 5 MG/1
5 TABLET, FILM COATED ORAL EVERY EVENING
Qty: 90 TABLET | Refills: 0 | Status: SHIPPED | OUTPATIENT
Start: 2021-04-02 | End: 2021-06-28 | Stop reason: SDUPTHER

## 2021-04-12 ENCOUNTER — TELEPHONE (OUTPATIENT)
Dept: HEMATOLOGY ONCOLOGY | Facility: CLINIC | Age: 61
End: 2021-04-12

## 2021-04-12 NOTE — TELEPHONE ENCOUNTER
LVM for patient stating that we had to r/s her appointment with Dr Christine Whittington from 4/21/21 to 5/11/21 at [address] [place] [address],[address] Floor patient to call our office if this does not work for her 
Cardiac

## 2021-04-15 ENCOUNTER — CLINICAL SUPPORT (OUTPATIENT)
Dept: FAMILY MEDICINE CLINIC | Facility: CLINIC | Age: 61
End: 2021-04-15
Payer: MEDICARE

## 2021-04-15 DIAGNOSIS — E53.8 VITAMIN B 12 DEFICIENCY: Primary | ICD-10-CM

## 2021-04-15 RX ORDER — CYANOCOBALAMIN 1000 UG/ML
1000 INJECTION INTRAMUSCULAR; SUBCUTANEOUS
Status: SHIPPED | OUTPATIENT
Start: 2021-04-15

## 2021-04-15 RX ORDER — CYANOCOBALAMIN 1000 UG/ML
1000 INJECTION INTRAMUSCULAR; SUBCUTANEOUS
Status: DISCONTINUED | OUTPATIENT
Start: 2021-04-15 | End: 2021-04-15

## 2021-04-15 RX ADMIN — CYANOCOBALAMIN 1000 MCG: 1000 INJECTION INTRAMUSCULAR; SUBCUTANEOUS at 14:15

## 2021-04-29 DIAGNOSIS — K21.9 GERD WITHOUT ESOPHAGITIS: ICD-10-CM

## 2021-04-29 RX ORDER — OMEPRAZOLE 20 MG/1
20 CAPSULE, DELAYED RELEASE ORAL DAILY
Qty: 90 CAPSULE | Refills: 0 | Status: SHIPPED | OUTPATIENT
Start: 2021-04-29 | End: 2021-09-13 | Stop reason: SDUPTHER

## 2021-04-29 NOTE — TELEPHONE ENCOUNTER
Pt last seen 1/6/21 and her last refill on 1/7/21 was for #90 with no refills   Sent to provider for approval

## 2021-05-12 ENCOUNTER — TELEPHONE (OUTPATIENT)
Dept: FAMILY MEDICINE CLINIC | Facility: CLINIC | Age: 61
End: 2021-05-12

## 2021-05-12 NOTE — TELEPHONE ENCOUNTER
Please call and remind her to proceed with the fasting lab orders in the system in preparation for her appointment on Tuesday May 18th arrival at 12:45 p m  thank you

## 2021-05-18 ENCOUNTER — OFFICE VISIT (OUTPATIENT)
Dept: FAMILY MEDICINE CLINIC | Facility: CLINIC | Age: 61
End: 2021-05-18
Payer: MEDICARE

## 2021-05-18 VITALS
BODY MASS INDEX: 39.91 KG/M2 | DIASTOLIC BLOOD PRESSURE: 80 MMHG | WEIGHT: 233.8 LBS | RESPIRATION RATE: 16 BRPM | OXYGEN SATURATION: 96 % | HEART RATE: 92 BPM | SYSTOLIC BLOOD PRESSURE: 124 MMHG | HEIGHT: 64 IN | TEMPERATURE: 98.3 F

## 2021-05-18 DIAGNOSIS — E78.2 MIXED HYPERLIPIDEMIA: ICD-10-CM

## 2021-05-18 DIAGNOSIS — E53.8 VITAMIN B 12 DEFICIENCY: Primary | ICD-10-CM

## 2021-05-18 DIAGNOSIS — Z12.31 ENCOUNTER FOR SCREENING MAMMOGRAM FOR MALIGNANT NEOPLASM OF BREAST: ICD-10-CM

## 2021-05-18 DIAGNOSIS — K21.9 GERD WITHOUT ESOPHAGITIS: ICD-10-CM

## 2021-05-18 DIAGNOSIS — M79.671 RIGHT FOOT PAIN: ICD-10-CM

## 2021-05-18 DIAGNOSIS — Z00.00 MEDICARE ANNUAL WELLNESS VISIT, SUBSEQUENT: ICD-10-CM

## 2021-05-18 DIAGNOSIS — L81.9 PIGMENTED SKIN LESIONS: ICD-10-CM

## 2021-05-18 DIAGNOSIS — D50.8 IRON DEFICIENCY ANEMIA SECONDARY TO INADEQUATE DIETARY IRON INTAKE: ICD-10-CM

## 2021-05-18 PROCEDURE — 99214 OFFICE O/P EST MOD 30 MIN: CPT | Performed by: PHYSICIAN ASSISTANT

## 2021-05-18 PROCEDURE — G0439 PPPS, SUBSEQ VISIT: HCPCS | Performed by: PHYSICIAN ASSISTANT

## 2021-05-18 RX ORDER — CYANOCOBALAMIN 1000 UG/ML
1000 INJECTION INTRAMUSCULAR; SUBCUTANEOUS
Status: SHIPPED | OUTPATIENT
Start: 2021-05-18

## 2021-05-18 RX ADMIN — CYANOCOBALAMIN 1000 MCG: 1000 INJECTION INTRAMUSCULAR; SUBCUTANEOUS at 14:08

## 2021-05-18 NOTE — PROGRESS NOTES
Assessment/Plan:     Medicare wellness has been done as a separate visit today   -I did give her a phone number for different podiatrist since she states she was not happy with the care from Dr Marlo Whitehead  -refer to Dermatology for pigmented lesions   -she did request another hematologist because she did have an incident with a nurse regarding an IV in December and she still has pigmented changes on her right antecubital region  I did advise her that she would have to go outside the system to [facility] but I do not know any names of any hematologist   I did recommend she check online   -vitamin B12 injection was given today   - she will continue with physical therapy for her balance  - she will proceed with the fasting lab orders in the system    -mammogram ordered  - I did recommend follow-up in October    M*Modal software was used to dictate this note  It may contain errors with dictating incorrect words/spelling  Please contact provider directly for any questions  Diagnoses and all orders for this visit:    Vitamin B 12 deficiency  -     cyanocobalamin injection 1,000 mcg    Medicare annual wellness visit, subsequent    GERD without esophagitis    Mixed hyperlipidemia    Iron deficiency anemia secondary to inadequate dietary iron intake    Right foot pain  -     Ambulatory referral to Podiatry; Future    Pigmented skin lesions  -     Ambulatory referral to Dermatology; Future    Encounter for screening mammogram for malignant neoplasm of breast  -     Mammo screening bilateral w cad; Future          Subjective:      Patient ID: Lian Bernstein is a 63 y o  female  Patient presents today for routine follow-up for reflux, hyperlipidemia, iron deficiency anemia, vitamin B12 deficiency  She states that she is compliant with her medications  She has been seeing Dr Rhoda Coleman for weight loss purposes and has had over a 20 lb weight loss so far    She also states that she needs to see a different podiatrist because of right foot pain  She states that she was not happy with the care from Dr Derinda Dancer  She would also like to see a dermatologist for pigmented lesions on her face and skin tags  The following portions of the patient's history were reviewed and updated as appropriate:   She  has a past medical history of Bipolar affective disorder, depressed ([address] ), Colon polyp, GERD (gastroesophageal reflux disease), Heart murmur, Osteoarthritis, Psychiatric disorder, Subdural hematoma ([address] ), and Vitamin B12 deficiency    She   Patient Active Problem List    Diagnosis Date Noted    Right foot pain 05/18/2021    Pigmented skin lesions 05/18/2021    History of total knee replacement, left 01/06/2021    Left thigh pain 01/06/2021    Left leg pain 11/30/2020    Chronic bilateral low back pain with sciatica 09/18/2020    Chronic fatigue 09/18/2020    Snoring 09/18/2020    Breast cancer screening 05/21/2020    History of colon polyps 05/05/2020    Morbid obesity with BMI of 40 0-44 9, adult ([facility] ) 03/17/2020    Medicare annual wellness visit, subsequent 03/17/2020    Mixed hyperlipidemia 03/17/2020    Seasonal allergic rhinitis due to pollen 01/23/2020    History of subarachnoid hemorrhage 01/22/2020    Stress incontinence 09/10/2019    Bipolar disorder, current episode mixed, mild ([facility] ) 09/10/2019    Screening for breast cancer 08/23/2019    Screening for colon cancer 08/23/2019    Yeast infection of the skin 08/23/2019    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    Iron deficiency anemia secondary to inadequate dietary iron intake 05/17/2019    Dermatitis 05/06/2019    Pre-op examination 05/06/2019    Chronic pain of left knee 05/06/2019    Cervical spondylosis 01/09/2019    Cervical stenosis of spinal canal 12/13/2018    Arthropathy 10/03/2012    Neck pain 10/03/2012    GERD without esophagitis 10/03/2012    Insomnia 10/03/2012    Vitamin B-complex deficiency 10/03/2012     She  has a past surgical history that includes Knee surgery (Right, 01/30/2020); Cervical spine surgery (2002); Gastric bypass (2004); Foot fracture surgery (Right, 2012); Foot surgery (Left); Cholecystectomy (05/1985); Hysterectomy; and Breast lumpectomy (Right)  Her family history includes Breast cancer in her maternal grandfather, maternal grandmother, paternal aunt, paternal aunt, paternal grandfather, and paternal grandmother; Breast cancer (age of onset: 77) in her half-sister; Colon cancer in her maternal uncle; Coronary artery disease in her family; Diabetes in her father; Heart attack in her family; Heart disease in her father  She  reports that she has never smoked  She has never used smokeless tobacco  She reports current alcohol use  She reports that she does not use drugs    Current Outpatient Medications   Medication Sig Dispense Refill    acetaminophen (TYLENOL) 650 mg CR tablet 1 as directed for pain      buprenorphine (Butrans) 15 mcg/hr Place 1 patch on the skin once a week      buPROPion (WELLBUTRIN) 100 mg tablet Take 200 mg by mouth 2 (two) times a day       busPIRone (BUSPAR) 15 mg tablet Take 15 mg by mouth 2 (two) times a day      clotrimazole-betamethasone (LOTRISONE) 1-0 05 % cream Apply topically 2 (two) times a day 30 g 0    DULoxetine (CYMBALTA) 60 mg delayed release capsule Take 120 mg by mouth daily       gabapentin (NEURONTIN) 800 mg tablet Take 1,600 mg by mouth daily at bedtime   0    hydrOXYzine HCL (ATARAX) 50 mg tablet Take 50 mg by mouth daily at bedtime  0    levocetirizine (XYZAL) 5 MG tablet Take 1 tablet (5 mg total) by mouth every evening 90 tablet 0    nystatin (MYCOSTATIN) cream Apply topically 2 (two) times a day 30 g 2    omeprazole (PriLOSEC) 20 mg delayed release capsule Take 1 capsule (20 mg total) by mouth daily 90 capsule 0    QUEtiapine (SEROquel) 400 MG tablet Take 800 mg by mouth daily at bedtime  0    tiZANidine (ZANAFLEX) 4 mg tablet Take 4 mg by mouth 3 (three) times a day  0    traMADol (ULTRAM) 50 mg tablet Take 1 tablet daily as needed 30 tablet 0    ARIPiprazole (ABILIFY) 10 mg tablet       oxyCODONE-acetaminophen (PERCOCET) 5-325 mg per tablet Take 1 tablet by mouth every 6 (six) hours as needed      polyethylene glycol (COLYTE) 4000 mL solution Take 240 g by mouth once for 1 dose 4000 mL 0     Current Facility-Administered Medications   Medication Dose Route Frequency Provider Last Rate Last Admin    cyanocobalamin injection 1,000 mcg  1,000 mcg Intramuscular Q30 Days Franci Vergara PA-C   1,000 mcg at 04/15/21 1415    cyanocobalamin injection 1,000 mcg  1,000 mcg Intramuscular Q30 Days Franci Vergara PA-C         Current Outpatient Medications on File Prior to Visit   Medication Sig    acetaminophen (TYLENOL) 650 mg CR tablet 1 as directed for pain    buprenorphine (Butrans) 15 mcg/hr Place 1 patch on the skin once a week    buPROPion (WELLBUTRIN) 100 mg tablet Take 200 mg by mouth 2 (two) times a day     busPIRone (BUSPAR) 15 mg tablet Take 15 mg by mouth 2 (two) times a day    clotrimazole-betamethasone (LOTRISONE) 1-0 05 % cream Apply topically 2 (two) times a day    DULoxetine (CYMBALTA) 60 mg delayed release capsule Take 120 mg by mouth daily     gabapentin (NEURONTIN) 800 mg tablet Take 1,600 mg by mouth daily at bedtime     hydrOXYzine HCL (ATARAX) 50 mg tablet Take 50 mg by mouth daily at bedtime    levocetirizine (XYZAL) 5 MG tablet Take 1 tablet (5 mg total) by mouth every evening    nystatin (MYCOSTATIN) cream Apply topically 2 (two) times a day    omeprazole (PriLOSEC) 20 mg delayed release capsule Take 1 capsule (20 mg total) by mouth daily    QUEtiapine (SEROquel) 400 MG tablet Take 800 mg by mouth daily at bedtime    tiZANidine (ZANAFLEX) 4 mg tablet Take 4 mg by mouth 3 (three) times a day    traMADol (ULTRAM) 50 mg tablet Take 1 tablet daily as needed    ARIPiprazole (ABILIFY) 10 mg tablet     oxyCODONE-acetaminophen (PERCOCET) 5-325 mg per tablet Take 1 tablet by mouth every 6 (six) hours as needed    polyethylene glycol (COLYTE) 4000 mL solution Take 240 g by mouth once for 1 dose     Current Facility-Administered Medications on File Prior to Visit   Medication    cyanocobalamin injection 1,000 mcg     She is allergic to prochlorperazine; rofecoxib; and erythromycin base       Review of Systems   Constitutional: Positive for fatigue (  She does have chronic fatigue which she states might be related to some of her medications  )  Negative for unexpected weight change  Respiratory: Negative for cough and shortness of breath  Cardiovascular: Negative for chest pain and leg swelling  Musculoskeletal:          As stated in HPI   Skin:         As stated in HPI         Objective:      /80 (BP Location: Left arm, Patient Position: Sitting, Cuff Size: Large)   Pulse 92   Temp 98 3 °F (36 8 °C) (Tympanic)   Resp 16   Ht 5' 4" (1 626 m)   Wt 106 kg (233 lb 12 8 oz)   SpO2 96%   BMI 40 13 kg/m²          Physical Exam  Constitutional:       General: She is not in acute distress  Appearance: Normal appearance  She is well-developed  She is not ill-appearing, toxic-appearing or diaphoretic  HENT:      Head: Normocephalic and atraumatic  Right Ear: Tympanic membrane, ear canal and external ear normal       Left Ear: Tympanic membrane, ear canal and external ear normal    Neck:      Musculoskeletal: Neck supple  Thyroid: No thyromegaly  Cardiovascular:      Rate and Rhythm: Normal rate and regular rhythm  Heart sounds: Normal heart sounds  No murmur  No friction rub  No gallop  Pulmonary:      Effort: Pulmonary effort is normal  No respiratory distress  Breath sounds: Normal breath sounds  No wheezing, rhonchi or rales  Abdominal:      General: Bowel sounds are normal       Palpations: Abdomen is soft  There is no mass  Tenderness: There is no abdominal tenderness     Musculoskeletal: General: No deformity  Lymphadenopathy:      Cervical: No cervical adenopathy  Skin:     General: Skin is warm  Comments:  She does have multiple macular pigmented lesions on her face   Neurological:      General: No focal deficit present  Mental Status: She is alert  Psychiatric:         Mood and Affect: Mood normal          Behavior: Behavior normal          Thought Content:  Thought content normal          Judgment: Judgment normal

## 2021-05-18 NOTE — PROGRESS NOTES
Assessment and Plan:     Problem List Items Addressed This Visit        Other    Medicare annual wellness visit, subsequent      Other Visit Diagnoses     Vitamin B 12 deficiency    -  Primary    Relevant Medications    cyanocobalamin injection 1,000 mcg (Start on 5/18/2021  1:45 PM)           Preventive health issues were discussed with patient, and age appropriate screening tests were ordered as noted in patient's After Visit Summary  Personalized health advice and appropriate referrals for health education or preventive services given if needed, as noted in patient's After Visit Summary       History of Present Illness:     Patient presents for Medicare Annual Wellness visit    Patient Care Team:  Ben Epstein PA-C as PCP - General (Family Medicine)     Problem List:     Patient Active Problem List   Diagnosis    Arthropathy    Neck pain    GERD without esophagitis    Insomnia    Cervical stenosis of spinal canal    Vitamin B-complex deficiency    Dermatitis    Pre-op examination    Chronic pain of left knee    Postgastrectomy malabsorption    Iron deficiency anemia secondary to inadequate dietary iron intake    Screening for breast cancer    Screening for colon cancer    Yeast infection of the skin    Stress incontinence    Bipolar disorder, current episode mixed, mild ([facility] [address] )    History of subarachnoid hemorrhage    Cervical spondylosis    Seasonal allergic rhinitis due to pollen    Morbid obesity with BMI of 40 0-44 9, adult ([facility] [address] )    Medicare annual wellness visit, subsequent    Mixed hyperlipidemia    History of colon polyps    Breast cancer screening    Chronic bilateral low back pain with sciatica    Chronic fatigue    Snoring    Left leg pain    History of total knee replacement, left    Left thigh pain      Past Medical and Surgical History:     Past Medical History:   Diagnosis Date    Bipolar affective disorder, depressed ([address] )     Colon polyp     GERD (gastroesophageal reflux disease)     Heart murmur     Osteoarthritis     Psychiatric disorder     Subdural hematoma (HCC)     Vitamin B12 deficiency      Past Surgical History:   Procedure Laterality Date    BREAST LUMPECTOMY Right     benign    CERVICAL SPINE SURGERY  2002    CHOLECYSTECTOMY  05/1985    FOOT FRACTURE SURGERY Right 2012    outcome - good    FOOT SURGERY Left     GASTRIC BYPASS  2004    x2- 2nd bypass 10/12    HYSTERECTOMY      age 54    KNEE SURGERY Right 01/30/2020      Family History:     Family History   Problem Relation Age of Onset    Diabetes Father     Heart disease Father     Coronary artery disease Family     Heart attack Family     Breast cancer Maternal Grandmother     Breast cancer Maternal Grandfather     Breast cancer Paternal Grandmother     Breast cancer Paternal Grandfather     Breast cancer Half-Sister 77    Breast cancer Paternal Aunt     Breast cancer Paternal Aunt     Colon cancer Maternal Uncle       Social History:     E-Cigarette/Vaping    E-Cigarette Use Never User      E-Cigarette/Vaping Substances    Nicotine No     THC No     CBD No     Flavoring No     Other No     Unknown No      Social History     Socioeconomic History    Marital status:      Spouse name: None    Number of children: None    Years of education: None    Highest education level: None   Occupational History    None   Social Needs    Financial resource strain: None    Food insecurity     Worry: None     Inability: None    Transportation needs     Medical: None     Non-medical: None   Tobacco Use    Smoking status: Never Smoker    Smokeless tobacco: Never Used    Tobacco comment: passive smoke exposure   Substance and Sexual Activity    Alcohol use:  Yes    Drug use: No    Sexual activity: None   Lifestyle    Physical activity     Days per week: None     Minutes per session: None    Stress: None   Relationships    Social connections     Talks on phone: None     Gets together: None     Attends [employer] service: None     Active member of club or organization: None     Attends meetings of clubs or organizations: None     Relationship status: None    Intimate partner violence     Fear of current or ex partner: None     Emotionally abused: None     Physically abused: None     Forced sexual activity: None   Other Topics Concern    None   Social History Narrative    None      Medications and Allergies:     Current Outpatient Medications   Medication Sig Dispense Refill    acetaminophen (TYLENOL) 650 mg CR tablet 1 as directed for pain      buprenorphine (Butrans) 15 mcg/hr Place 1 patch on the skin once a week      buPROPion (WELLBUTRIN) 100 mg tablet Take 200 mg by mouth 2 (two) times a day       busPIRone (BUSPAR) 15 mg tablet Take 15 mg by mouth 2 (two) times a day      clotrimazole-betamethasone (LOTRISONE) 1-0 05 % cream Apply topically 2 (two) times a day 30 g 0    DULoxetine (CYMBALTA) 60 mg delayed release capsule Take 120 mg by mouth daily       gabapentin (NEURONTIN) 800 mg tablet Take 1,600 mg by mouth daily at bedtime   0    hydrOXYzine HCL (ATARAX) 50 mg tablet Take 50 mg by mouth daily at bedtime  0    levocetirizine (XYZAL) 5 MG tablet Take 1 tablet (5 mg total) by mouth every evening 90 tablet 0    nystatin (MYCOSTATIN) cream Apply topically 2 (two) times a day 30 g 2    omeprazole (PriLOSEC) 20 mg delayed release capsule Take 1 capsule (20 mg total) by mouth daily 90 capsule 0    QUEtiapine (SEROquel) 400 MG tablet Take 800 mg by mouth daily at bedtime  0    tiZANidine (ZANAFLEX) 4 mg tablet Take 4 mg by mouth 3 (three) times a day  0    traMADol (ULTRAM) 50 mg tablet Take 1 tablet daily as needed 30 tablet 0    ARIPiprazole (ABILIFY) 10 mg tablet       oxyCODONE-acetaminophen (PERCOCET) 5-325 mg per tablet Take 1 tablet by mouth every 6 (six) hours as needed      polyethylene glycol (COLYTE) 4000 mL solution Take 240 g by mouth once for 1 dose 4000 mL 0     Current Facility-Administered Medications   Medication Dose Route Frequency Provider Last Rate Last Admin    cyanocobalamin injection 1,000 mcg  1,000 mcg Intramuscular Q30 Days Franci Vergara PA-C   1,000 mcg at 04/15/21 1415    cyanocobalamin injection 1,000 mcg  1,000 mcg Intramuscular Q30 Days Franci Vergara PA-C         Allergies   Allergen Reactions    Prochlorperazine Other (See Comments)     seizure,swollen tongue    Rofecoxib Other (See Comments)     internal bleeding    Erythromycin Base Other (See Comments)     seizure      Immunizations:     Immunization History   Administered Date(s) Administered    INFLUENZA 09/20/2016, 09/20/2016, 11/16/2017, 11/16/2017    Influenza Split 09/17/2013    Influenza, injectable, quadrivalent, preservative free 0 5 mL 09/20/2018    Influenza, recombinant, quadrivalent,injectable, preservative free 11/21/2019, 10/20/2020    Influenza, seasonal, injectable 07/02/2012    SARS-CoV-2 / COVID-19 mRNA IM (Pfizer-BioNTech) 03/29/2021, 04/19/2021    Tdap 10/22/2003, 06/25/2017      Health Maintenance:         Topic Date Due    HIV Screening  Never done    Cervical Cancer Screening  Never done    MAMMOGRAM  09/03/2020    Colorectal Cancer Screening  12/21/2023    Hepatitis C Screening  Completed         Topic Date Due    COVID-19 Vaccine (2 - Pfizer 2-dose series) 04/19/2021      Medicare Health Risk Assessment:     /80 (BP Location: Left arm, Patient Position: Sitting, Cuff Size: Large)   Pulse 92   Temp 98 3 °F (36 8 °C) (Tympanic)   Resp 16   Ht 5' 4" (1 626 m)   Wt 106 kg (233 lb 12 8 oz)   SpO2 96%   BMI 40 13 kg/m²      Ludin Barr is here for her Subsequent Wellness visit  Health Risk Assessment:   Patient rates overall health as fair  Patient feels that their physical health rating is same  Patient is satisfied with their life  Eyesight was rated as slightly worse  Hearing was rated as slightly worse   Patient feels that their emotional and mental health rating is much better  Patients states they are never, rarely angry  Patient states they are always unusually tired/fatigued  Pain experienced in the last 7 days has been some  Patient's pain rating has been 4/10  Patient states that she has experienced weight loss or gain in last 6 months  Depression Screening:   PHQ-2 Score: 0      Fall Risk Screening: In the past year, patient has experienced: history of falling in past year    Number of falls: 2 or more  Injured during fall?: No    Feels unsteady when standing or walking?: Yes    Worried about falling?: Yes      Urinary Incontinence Screening:   Patient has not leaked urine accidently in the last six months  Home Safety:  Patient has trouble with stairs inside or outside of their home  Patient has working smoke alarms and has working carbon monoxide detector  Home safety hazards include: none  Nutrition:   Current diet is Other (please comment)  No carbs    Medications:   Patient is currently taking over-the-counter supplements  OTC medications include: see medication list  Patient is able to manage medications  Activities of Daily Living (ADLs)/Instrumental Activities of Daily Living (IADLs):   Walk and transfer into and out of bed and chair?: Yes  Dress and groom yourself?: Yes    Bathe or shower yourself?: Yes    Feed yourself?  Yes  Do your laundry/housekeeping?: Yes  Manage your money, pay your bills and track your expenses?: Yes  Make your own meals?: Yes    Do your own shopping?: Yes    Previous Hospitalizations:   Any hospitalizations or ED visits within the last 12 months?: No      Advance Care Planning:   Living will: No    Durable POA for healthcare: No    Advanced directive: No      PREVENTIVE SCREENINGS      Cardiovascular Screening:    General: Screening Not Indicated and History Lipid Disorder      Diabetes Screening:     General: Screening Current      Colorectal Cancer Screening:     General: Screening Current      Breast Cancer Screening:     General: Screening Current      Osteoporosis Screening:    General: Screening Not Indicated      Abdominal Aortic Aneurysm (AAA) Screening:        General: Screening Not Indicated      Lung Cancer Screening:     General: Screening Not Indicated      Hepatitis C Screening:    General: Screening Current    Screening, Brief Intervention, and Referral to Treatment (SBIRT)    Screening  Typical number of drinks in a day: 0  Typical number of drinks in a week: 0  Interpretation: Low risk drinking behavior        Franci Vergara PA-C

## 2021-05-24 DIAGNOSIS — D50.8 IRON DEFICIENCY ANEMIA SECONDARY TO INADEQUATE DIETARY IRON INTAKE: ICD-10-CM

## 2021-05-24 DIAGNOSIS — K91.2 POSTGASTRECTOMY MALABSORPTION: Primary | ICD-10-CM

## 2021-05-24 DIAGNOSIS — Z90.3 POSTGASTRECTOMY MALABSORPTION: Primary | ICD-10-CM

## 2021-06-18 ENCOUNTER — CLINICAL SUPPORT (OUTPATIENT)
Dept: FAMILY MEDICINE CLINIC | Facility: CLINIC | Age: 61
End: 2021-06-18
Payer: MEDICARE

## 2021-06-18 DIAGNOSIS — E53.8 VITAMIN B 12 DEFICIENCY: Primary | ICD-10-CM

## 2021-06-18 PROCEDURE — 96372 THER/PROPH/DIAG INJ SC/IM: CPT

## 2021-06-18 RX ORDER — CYANOCOBALAMIN 1000 UG/ML
1000 INJECTION INTRAMUSCULAR; SUBCUTANEOUS
Status: SHIPPED | OUTPATIENT
Start: 2021-06-18

## 2021-06-18 RX ADMIN — CYANOCOBALAMIN 1000 MCG: 1000 INJECTION INTRAMUSCULAR; SUBCUTANEOUS at 13:25

## 2021-06-28 DIAGNOSIS — J30.1 SEASONAL ALLERGIC RHINITIS DUE TO POLLEN: ICD-10-CM

## 2021-06-28 RX ORDER — LEVOCETIRIZINE DIHYDROCHLORIDE 5 MG/1
5 TABLET, FILM COATED ORAL EVERY EVENING
Qty: 90 TABLET | Refills: 0 | Status: SHIPPED | OUTPATIENT
Start: 2021-06-28 | End: 2021-09-29 | Stop reason: SDUPTHER

## 2021-07-16 ENCOUNTER — APPOINTMENT (OUTPATIENT)
Dept: LAB | Facility: CLINIC | Age: 61
End: 2021-07-16
Payer: MEDICARE

## 2021-07-16 LAB
ALBUMIN SERPL BCP-MCNC: 4 G/DL (ref 3.5–5)
ALP SERPL-CCNC: 151 U/L (ref 46–116)
ALT SERPL W P-5'-P-CCNC: 29 U/L (ref 12–78)
ANION GAP SERPL CALCULATED.3IONS-SCNC: 8 MMOL/L (ref 4–13)
AST SERPL W P-5'-P-CCNC: 20 U/L (ref 5–45)
BASOPHILS # BLD AUTO: 0.03 THOUSANDS/ΜL (ref 0–0.1)
BASOPHILS NFR BLD AUTO: 1 % (ref 0–1)
BILIRUB SERPL-MCNC: 0.65 MG/DL (ref 0.2–1)
BUN SERPL-MCNC: 5 MG/DL (ref 5–25)
CALCIUM SERPL-MCNC: 8.9 MG/DL (ref 8.3–10.1)
CHLORIDE SERPL-SCNC: 101 MMOL/L (ref 100–108)
CHOLEST SERPL-MCNC: 172 MG/DL (ref 50–200)
CO2 SERPL-SCNC: 24 MMOL/L (ref 21–32)
CREAT SERPL-MCNC: 0.79 MG/DL (ref 0.6–1.3)
EOSINOPHIL # BLD AUTO: 0 THOUSAND/ΜL (ref 0–0.61)
EOSINOPHIL NFR BLD AUTO: 0 % (ref 0–6)
ERYTHROCYTE [DISTWIDTH] IN BLOOD BY AUTOMATED COUNT: 14.3 % (ref 11.6–15.1)
GFR SERPL CREATININE-BSD FRML MDRD: 81 ML/MIN/1.73SQ M
GLUCOSE P FAST SERPL-MCNC: 100 MG/DL (ref 65–99)
HCT VFR BLD AUTO: 41.7 % (ref 34.8–46.1)
HDLC SERPL-MCNC: 70 MG/DL
HGB BLD-MCNC: 13.6 G/DL (ref 11.5–15.4)
IMM GRANULOCYTES # BLD AUTO: 0.02 THOUSAND/UL (ref 0–0.2)
IMM GRANULOCYTES NFR BLD AUTO: 0 % (ref 0–2)
LDLC SERPL CALC-MCNC: 85 MG/DL (ref 0–100)
LYMPHOCYTES # BLD AUTO: 2.23 THOUSANDS/ΜL (ref 0.6–4.47)
LYMPHOCYTES NFR BLD AUTO: 38 % (ref 14–44)
MCH RBC QN AUTO: 28.7 PG (ref 26.8–34.3)
MCHC RBC AUTO-ENTMCNC: 32.6 G/DL (ref 31.4–37.4)
MCV RBC AUTO: 88 FL (ref 82–98)
MONOCYTES # BLD AUTO: 0.62 THOUSAND/ΜL (ref 0.17–1.22)
MONOCYTES NFR BLD AUTO: 11 % (ref 4–12)
NEUTROPHILS # BLD AUTO: 3 THOUSANDS/ΜL (ref 1.85–7.62)
NEUTS SEG NFR BLD AUTO: 50 % (ref 43–75)
NONHDLC SERPL-MCNC: 102 MG/DL
NRBC BLD AUTO-RTO: 0 /100 WBCS
PLATELET # BLD AUTO: 304 THOUSANDS/UL (ref 149–390)
PMV BLD AUTO: 10.6 FL (ref 8.9–12.7)
POTASSIUM SERPL-SCNC: 4.2 MMOL/L (ref 3.5–5.3)
PROT SERPL-MCNC: 7.2 G/DL (ref 6.4–8.2)
RBC # BLD AUTO: 4.74 MILLION/UL (ref 3.81–5.12)
SODIUM SERPL-SCNC: 133 MMOL/L (ref 136–145)
TRIGL SERPL-MCNC: 87 MG/DL
WBC # BLD AUTO: 5.9 THOUSAND/UL (ref 4.31–10.16)

## 2021-07-16 PROCEDURE — 36415 COLL VENOUS BLD VENIPUNCTURE: CPT | Performed by: PHYSICIAN ASSISTANT

## 2021-07-16 PROCEDURE — 85025 COMPLETE CBC W/AUTO DIFF WBC: CPT | Performed by: PHYSICIAN ASSISTANT

## 2021-07-16 PROCEDURE — 80053 COMPREHEN METABOLIC PANEL: CPT | Performed by: PHYSICIAN ASSISTANT

## 2021-07-16 PROCEDURE — 80061 LIPID PANEL: CPT | Performed by: PHYSICIAN ASSISTANT

## 2021-07-19 ENCOUNTER — TELEPHONE (OUTPATIENT)
Dept: FAMILY MEDICINE CLINIC | Facility: CLINIC | Age: 61
End: 2021-07-19

## 2021-07-19 NOTE — TELEPHONE ENCOUNTER
Pt aware of results and she will see Zack Krishnan on 10/19/21 as she was scheduled  She most ashley will follow Franci but will wait to see how her appointment goes with Zack Krishnan to decide

## 2021-07-19 NOTE — TELEPHONE ENCOUNTER
----- Message from Jef Schaeffer PA-C sent at 7/19/2021  7:33 AM EDT -----   Please let her know that her blood work is good for kidneys, liver, blood sugar, blood count and cholesterol panel    Please also let her know that I a m  transferring to a no other [facility] office on [facility] since she is scheduled with me on 10/19/2021

## 2021-08-31 ENCOUNTER — CLINICAL SUPPORT (OUTPATIENT)
Dept: FAMILY MEDICINE CLINIC | Facility: CLINIC | Age: 61
End: 2021-08-31
Payer: MEDICARE

## 2021-08-31 DIAGNOSIS — E53.8 VITAMIN B 12 DEFICIENCY: Primary | ICD-10-CM

## 2021-08-31 RX ORDER — CYANOCOBALAMIN 1000 UG/ML
1000 INJECTION INTRAMUSCULAR; SUBCUTANEOUS
Status: SHIPPED | OUTPATIENT
Start: 2021-08-31

## 2021-08-31 RX ADMIN — CYANOCOBALAMIN 1000 MCG: 1000 INJECTION INTRAMUSCULAR; SUBCUTANEOUS at 10:45

## 2021-09-13 DIAGNOSIS — K21.9 GERD WITHOUT ESOPHAGITIS: ICD-10-CM

## 2021-09-13 RX ORDER — OMEPRAZOLE 20 MG/1
20 CAPSULE, DELAYED RELEASE ORAL DAILY
Qty: 90 CAPSULE | Refills: 0 | Status: SHIPPED | OUTPATIENT
Start: 2021-09-13 | End: 2021-10-19 | Stop reason: SDUPTHER

## 2021-09-24 ENCOUNTER — OFFICE VISIT (OUTPATIENT)
Dept: FAMILY MEDICINE CLINIC | Facility: CLINIC | Age: 61
End: 2021-09-24
Payer: MEDICARE

## 2021-09-24 VITALS
TEMPERATURE: 97.6 F | WEIGHT: 223.2 LBS | DIASTOLIC BLOOD PRESSURE: 84 MMHG | HEIGHT: 64 IN | OXYGEN SATURATION: 97 % | RESPIRATION RATE: 16 BRPM | BODY MASS INDEX: 38.1 KG/M2 | HEART RATE: 88 BPM | SYSTOLIC BLOOD PRESSURE: 126 MMHG

## 2021-09-24 DIAGNOSIS — E53.8 VITAMIN B 12 DEFICIENCY: ICD-10-CM

## 2021-09-24 DIAGNOSIS — T14.8XXA SPLINTER IN SKIN: Primary | ICD-10-CM

## 2021-09-24 PROCEDURE — 10120 INC&RMVL FB SUBQ TISS SMPL: CPT | Performed by: NURSE PRACTITIONER

## 2021-09-24 PROCEDURE — 99214 OFFICE O/P EST MOD 30 MIN: CPT | Performed by: NURSE PRACTITIONER

## 2021-09-24 PROCEDURE — 96372 THER/PROPH/DIAG INJ SC/IM: CPT

## 2021-09-24 RX ORDER — BREXPIPRAZOLE 3 MG/1
3 TABLET ORAL DAILY
COMMUNITY
Start: 2021-09-15

## 2021-09-24 RX ORDER — TRAZODONE HYDROCHLORIDE 100 MG/1
100 TABLET ORAL
COMMUNITY
Start: 2021-08-24

## 2021-09-24 RX ORDER — CYANOCOBALAMIN 1000 UG/ML
1000 INJECTION INTRAMUSCULAR; SUBCUTANEOUS
Status: SHIPPED | OUTPATIENT
Start: 2021-09-24

## 2021-09-24 RX ADMIN — CYANOCOBALAMIN 1000 MCG: 1000 INJECTION INTRAMUSCULAR; SUBCUTANEOUS at 11:57

## 2021-09-24 NOTE — PROGRESS NOTES
Assessment/Plan:    Splinter in skin  - Splinter removed without complication   - See procedure note for full documentation  Vitamin B 12 deficiency  - Stable  - Patient received Vitamin B12 injection in office today  - Will continue to monitor  Diagnoses and all orders for this visit:    Hank Mckinnon in skin    Vitamin B 12 deficiency  -     cyanocobalamin injection 1,000 mcg    Other orders  -     Rexulti 3 MG tablet; Take 3 mg by mouth daily  -     traZODone (DESYREL) 100 mg tablet; Take 100 mg by mouth daily at bedtime  -     Foreign body removal        Subjective:      Patient ID: Obey Valdivia is a 63 y o  female  Patient presents to office today with complaints of a splinter in her right second toe  She states that it has been there for about a week  She sustained the splinter after walking on unfinished hardwood floors  She states the area is somewhat painful  The following portions of the patient's history were reviewed and updated as appropriate: allergies, current medications, past family history, past medical history, past social history, past surgical history and problem list     Review of Systems   Constitutional: Negative for fatigue and fever  HENT: Negative for trouble swallowing  Eyes: Negative for visual disturbance  Respiratory: Negative for cough and shortness of breath  Cardiovascular: Negative for chest pain and palpitations  Gastrointestinal: Negative for abdominal pain and blood in stool  Endocrine: Negative for cold intolerance and heat intolerance  Genitourinary: Negative for difficulty urinating and dysuria  Musculoskeletal: Negative for gait problem  Skin: Negative for rash  Splinter in toe     Neurological: Negative for dizziness, syncope and headaches  Hematological: Negative for adenopathy  Psychiatric/Behavioral: Negative for behavioral problems  Objective:    Universal Protocol:  Consent: Verbal consent obtained    Consent given by: patient  Patient understanding: patient states understanding of the procedure being performed  Patient identity confirmed: verbally with patient    Foreign body removal    Date/Time: 9/24/2021 3:39 PM  Performed by: WHITNEY Avila  Authorized by: WHITNEY Avila   Intake: toe  Sedation:  Patient sedated: no  Patient restrained: no  Complexity: simple  1 objects recovered  Objects recovered: splinter  Post-procedure assessment: foreign body removed  Patient tolerance: patient tolerated the procedure well with no immediate complications      /15 (BP Location: Left arm, Patient Position: Sitting, Cuff Size: Large)   Pulse 88   Temp 97 6 °F (36 4 °C) (Tympanic)   Resp 16   Ht 5' 4" (1 626 m)   Wt 101 kg (223 lb 3 2 oz)   SpO2 97%   BMI 38 31 kg/m²          Physical Exam  Vitals and nursing note reviewed  Constitutional:       Appearance: Normal appearance  HENT:      Head: Normocephalic and atraumatic  Left Ear: External ear normal    Eyes:      Conjunctiva/sclera: Conjunctivae normal    Cardiovascular:      Rate and Rhythm: Normal rate and regular rhythm  Heart sounds: Normal heart sounds  Pulmonary:      Effort: Pulmonary effort is normal       Breath sounds: Normal breath sounds  Musculoskeletal:         General: Normal range of motion  Cervical back: Normal range of motion  Feet:      Right foot:      Skin integrity: Erythema (small amount of erythema surrounding splinter on second toe) present  Skin:     General: Skin is warm and dry  Neurological:      Mental Status: She is alert and oriented to person, place, and time  Cranial Nerves: No cranial nerve deficit     Psychiatric:         Mood and Affect: Mood normal          Behavior: Behavior normal

## 2021-09-29 DIAGNOSIS — J30.1 SEASONAL ALLERGIC RHINITIS DUE TO POLLEN: ICD-10-CM

## 2021-09-29 RX ORDER — LEVOCETIRIZINE DIHYDROCHLORIDE 5 MG/1
5 TABLET, FILM COATED ORAL EVERY EVENING
Qty: 90 TABLET | Refills: 0 | Status: SHIPPED | OUTPATIENT
Start: 2021-09-29 | End: 2021-10-19 | Stop reason: SDUPTHER

## 2021-10-12 ENCOUNTER — RA CDI HCC (OUTPATIENT)
Dept: OTHER | Facility: HOSPITAL | Age: 61
End: 2021-10-12

## 2021-10-19 ENCOUNTER — OFFICE VISIT (OUTPATIENT)
Dept: FAMILY MEDICINE CLINIC | Facility: CLINIC | Age: 61
End: 2021-10-19
Payer: MEDICARE

## 2021-10-19 VITALS
OXYGEN SATURATION: 95 % | HEIGHT: 64 IN | DIASTOLIC BLOOD PRESSURE: 72 MMHG | HEART RATE: 85 BPM | WEIGHT: 213 LBS | TEMPERATURE: 98.7 F | RESPIRATION RATE: 14 BRPM | SYSTOLIC BLOOD PRESSURE: 110 MMHG | BODY MASS INDEX: 36.37 KG/M2

## 2021-10-19 DIAGNOSIS — R20.0 NUMBNESS AND TINGLING OF BOTH FEET: ICD-10-CM

## 2021-10-19 DIAGNOSIS — R26.89 BALANCE PROBLEM: Primary | ICD-10-CM

## 2021-10-19 DIAGNOSIS — L30.9 DERMATITIS: ICD-10-CM

## 2021-10-19 DIAGNOSIS — R53.83 OTHER FATIGUE: ICD-10-CM

## 2021-10-19 DIAGNOSIS — R20.2 NUMBNESS AND TINGLING OF BOTH FEET: ICD-10-CM

## 2021-10-19 DIAGNOSIS — K21.9 GERD WITHOUT ESOPHAGITIS: ICD-10-CM

## 2021-10-19 DIAGNOSIS — J30.1 SEASONAL ALLERGIC RHINITIS DUE TO POLLEN: ICD-10-CM

## 2021-10-19 DIAGNOSIS — E55.9 VITAMIN D DEFICIENCY, UNSPECIFIED: ICD-10-CM

## 2021-10-19 DIAGNOSIS — R73.9 HYPERGLYCEMIA, UNSPECIFIED: ICD-10-CM

## 2021-10-19 PROCEDURE — 99214 OFFICE O/P EST MOD 30 MIN: CPT | Performed by: NURSE PRACTITIONER

## 2021-10-19 RX ORDER — LEVOCETIRIZINE DIHYDROCHLORIDE 5 MG/1
5 TABLET, FILM COATED ORAL EVERY EVENING
Qty: 90 TABLET | Refills: 0 | Status: SHIPPED | OUTPATIENT
Start: 2021-10-19 | End: 2021-12-15 | Stop reason: SDUPTHER

## 2021-10-19 RX ORDER — CLOTRIMAZOLE AND BETAMETHASONE DIPROPIONATE 10; .64 MG/G; MG/G
CREAM TOPICAL 2 TIMES DAILY
Qty: 30 G | Refills: 0 | Status: SHIPPED | OUTPATIENT
Start: 2021-10-19 | End: 2022-03-14 | Stop reason: SDUPTHER

## 2021-10-19 RX ORDER — OMEPRAZOLE 20 MG/1
20 CAPSULE, DELAYED RELEASE ORAL DAILY
Qty: 90 CAPSULE | Refills: 0 | Status: SHIPPED | OUTPATIENT
Start: 2021-10-19 | End: 2021-12-15 | Stop reason: SDUPTHER

## 2021-10-22 ENCOUNTER — APPOINTMENT (OUTPATIENT)
Dept: LAB | Facility: IMAGING CENTER | Age: 61
End: 2021-10-22
Payer: MEDICARE

## 2021-10-22 ENCOUNTER — HOSPITAL ENCOUNTER (OUTPATIENT)
Dept: RADIOLOGY | Facility: IMAGING CENTER | Age: 61
Discharge: HOME | End: 2021-10-22
Payer: MEDICARE

## 2021-10-22 VITALS — HEIGHT: 63 IN | BODY MASS INDEX: 37.03 KG/M2 | WEIGHT: 209 LBS

## 2021-10-22 DIAGNOSIS — R20.0 NUMBNESS AND TINGLING OF BOTH FEET: ICD-10-CM

## 2021-10-22 DIAGNOSIS — R53.83 OTHER FATIGUE: ICD-10-CM

## 2021-10-22 DIAGNOSIS — R20.2 NUMBNESS AND TINGLING OF BOTH FEET: ICD-10-CM

## 2021-10-22 DIAGNOSIS — R73.9 HYPERGLYCEMIA, UNSPECIFIED: ICD-10-CM

## 2021-10-22 DIAGNOSIS — E55.9 VITAMIN D DEFICIENCY, UNSPECIFIED: ICD-10-CM

## 2021-10-22 DIAGNOSIS — Z12.31 ENCOUNTER FOR SCREENING MAMMOGRAM FOR MALIGNANT NEOPLASM OF BREAST: ICD-10-CM

## 2021-10-22 LAB
25(OH)D3 SERPL-MCNC: 21.9 NG/ML (ref 30–100)
EST. AVERAGE GLUCOSE BLD GHB EST-MCNC: 100 MG/DL
HBA1C MFR BLD: 5.1 %
VIT B12 SERPL-MCNC: 297 PG/ML (ref 100–900)

## 2021-10-22 PROCEDURE — 82607 VITAMIN B-12: CPT

## 2021-10-22 PROCEDURE — 77063 BREAST TOMOSYNTHESIS BI: CPT

## 2021-10-22 PROCEDURE — 77067 SCR MAMMO BI INCL CAD: CPT

## 2021-10-22 PROCEDURE — 36415 COLL VENOUS BLD VENIPUNCTURE: CPT

## 2021-10-22 PROCEDURE — 82306 VITAMIN D 25 HYDROXY: CPT

## 2021-10-22 PROCEDURE — 83036 HEMOGLOBIN GLYCOSYLATED A1C: CPT

## 2021-10-26 ENCOUNTER — TELEPHONE (OUTPATIENT)
Dept: FAMILY MEDICINE CLINIC | Facility: CLINIC | Age: 61
End: 2021-10-26

## 2021-10-27 ENCOUNTER — CLINICAL SUPPORT (OUTPATIENT)
Dept: FAMILY MEDICINE CLINIC | Facility: CLINIC | Age: 61
End: 2021-10-27
Payer: MEDICARE

## 2021-10-27 DIAGNOSIS — E53.8 VITAMIN B 12 DEFICIENCY: Primary | ICD-10-CM

## 2021-10-27 PROCEDURE — 96372 THER/PROPH/DIAG INJ SC/IM: CPT

## 2021-10-27 RX ORDER — CYANOCOBALAMIN 1000 UG/ML
1000 INJECTION INTRAMUSCULAR; SUBCUTANEOUS
Status: SHIPPED | OUTPATIENT
Start: 2021-10-27

## 2021-10-27 RX ADMIN — CYANOCOBALAMIN 1000 MCG: 1000 INJECTION INTRAMUSCULAR; SUBCUTANEOUS at 11:18

## 2021-11-18 ENCOUNTER — TELEPHONE (OUTPATIENT)
Dept: HEMATOLOGY ONCOLOGY | Facility: CLINIC | Age: 61
End: 2021-11-18

## 2021-11-19 ENCOUNTER — TELEPHONE (OUTPATIENT)
Dept: FAMILY MEDICINE CLINIC | Facility: CLINIC | Age: 61
End: 2021-11-19

## 2021-12-15 ENCOUNTER — OFFICE VISIT (OUTPATIENT)
Dept: FAMILY MEDICINE CLINIC | Facility: CLINIC | Age: 61
End: 2021-12-15
Payer: MEDICARE

## 2021-12-15 VITALS
HEART RATE: 95 BPM | DIASTOLIC BLOOD PRESSURE: 70 MMHG | RESPIRATION RATE: 16 BRPM | BODY MASS INDEX: 36.14 KG/M2 | SYSTOLIC BLOOD PRESSURE: 110 MMHG | HEIGHT: 63 IN | OXYGEN SATURATION: 95 % | WEIGHT: 204 LBS | TEMPERATURE: 99.4 F

## 2021-12-15 DIAGNOSIS — J30.1 SEASONAL ALLERGIC RHINITIS DUE TO POLLEN: ICD-10-CM

## 2021-12-15 DIAGNOSIS — R20.0 NUMBNESS AND TINGLING OF BOTH FEET: ICD-10-CM

## 2021-12-15 DIAGNOSIS — R20.2 NUMBNESS AND TINGLING OF BOTH FEET: ICD-10-CM

## 2021-12-15 DIAGNOSIS — K21.9 GERD WITHOUT ESOPHAGITIS: Primary | ICD-10-CM

## 2021-12-15 DIAGNOSIS — S90.851A SPLINTER OF RIGHT FOOT WITHOUT INFECTION, INITIAL ENCOUNTER: ICD-10-CM

## 2021-12-15 DIAGNOSIS — R26.89 BALANCE PROBLEM: ICD-10-CM

## 2021-12-15 PROCEDURE — 99214 OFFICE O/P EST MOD 30 MIN: CPT | Performed by: FAMILY MEDICINE

## 2021-12-15 PROCEDURE — 28190 REMOVAL OF FOOT FOREIGN BODY: CPT | Performed by: FAMILY MEDICINE

## 2021-12-15 RX ORDER — LEVOCETIRIZINE DIHYDROCHLORIDE 5 MG/1
5 TABLET, FILM COATED ORAL EVERY EVENING
Qty: 90 TABLET | Refills: 1 | Status: SHIPPED | OUTPATIENT
Start: 2021-12-15 | End: 2022-06-23 | Stop reason: SDUPTHER

## 2021-12-15 RX ORDER — DULOXETIN HYDROCHLORIDE 30 MG/1
CAPSULE, DELAYED RELEASE ORAL
COMMUNITY
Start: 2021-12-14

## 2021-12-15 RX ORDER — OMEPRAZOLE 20 MG/1
20 CAPSULE, DELAYED RELEASE ORAL DAILY
Qty: 90 CAPSULE | Refills: 1 | Status: SHIPPED | OUTPATIENT
Start: 2021-12-15 | End: 2022-03-14 | Stop reason: SDUPTHER

## 2021-12-16 ENCOUNTER — TELEPHONE (OUTPATIENT)
Dept: FAMILY MEDICINE CLINIC | Facility: CLINIC | Age: 61
End: 2021-12-16

## 2021-12-28 ENCOUNTER — TRANSITIONAL CARE MANAGEMENT (OUTPATIENT)
Dept: FAMILY MEDICINE CLINIC | Facility: CLINIC | Age: 61
End: 2021-12-28

## 2021-12-28 ENCOUNTER — TELEPHONE (OUTPATIENT)
Dept: FAMILY MEDICINE CLINIC | Facility: CLINIC | Age: 61
End: 2021-12-28

## 2022-01-06 ENCOUNTER — OFFICE VISIT (OUTPATIENT)
Dept: FAMILY MEDICINE CLINIC | Facility: CLINIC | Age: 62
End: 2022-01-06
Payer: MEDICARE

## 2022-01-06 VITALS
SYSTOLIC BLOOD PRESSURE: 122 MMHG | HEART RATE: 87 BPM | WEIGHT: 198 LBS | TEMPERATURE: 98.2 F | HEIGHT: 63 IN | RESPIRATION RATE: 16 BRPM | DIASTOLIC BLOOD PRESSURE: 78 MMHG | BODY MASS INDEX: 35.08 KG/M2

## 2022-01-06 DIAGNOSIS — E66.9 OBESITY (BMI 35.0-39.9 WITHOUT COMORBIDITY): ICD-10-CM

## 2022-01-06 DIAGNOSIS — E78.2 MIXED HYPERLIPIDEMIA: ICD-10-CM

## 2022-01-06 DIAGNOSIS — Z23 NEED FOR INFLUENZA VACCINATION: ICD-10-CM

## 2022-01-06 DIAGNOSIS — F31.81 BIPOLAR 2 DISORDER (HCC): ICD-10-CM

## 2022-01-06 DIAGNOSIS — N39.498 OTHER URINARY INCONTINENCE: ICD-10-CM

## 2022-01-06 DIAGNOSIS — E53.8 VITAMIN B 12 DEFICIENCY: Primary | ICD-10-CM

## 2022-01-06 DIAGNOSIS — R73.9 HYPERGLYCEMIA, UNSPECIFIED: ICD-10-CM

## 2022-01-06 DIAGNOSIS — R26.9 GAIT DISORDER: ICD-10-CM

## 2022-01-06 PROBLEM — R32 URINARY INCONTINENCE: Status: ACTIVE | Noted: 2022-01-06

## 2022-01-06 PROCEDURE — 99495 TRANSJ CARE MGMT MOD F2F 14D: CPT | Performed by: FAMILY MEDICINE

## 2022-01-06 PROCEDURE — G0008 ADMIN INFLUENZA VIRUS VAC: HCPCS

## 2022-01-06 PROCEDURE — 90682 RIV4 VACC RECOMBINANT DNA IM: CPT

## 2022-01-06 RX ORDER — CYANOCOBALAMIN 1000 UG/ML
1000 INJECTION INTRAMUSCULAR; SUBCUTANEOUS
Status: SHIPPED | OUTPATIENT
Start: 2022-01-06

## 2022-01-06 RX ADMIN — CYANOCOBALAMIN 1000 MCG: 1000 INJECTION INTRAMUSCULAR; SUBCUTANEOUS at 13:59

## 2022-01-06 NOTE — ASSESSMENT & PLAN NOTE
Patient follows with psychiatrist  Medication changes made in recent hospitalization   Has follow up appointment with psychiatrist this month

## 2022-01-06 NOTE — PROGRESS NOTES
TCM Call (since 12/6/2021)     Date and time call was made  12/28/2021  9:42 AM    Hospital care reviewed  Records reviewed        Patient was hospitialized at  [facility]        Date of Admission  12/17/21    Date of discharge  12/22/21    Diagnosis  hyponatremia    Disposition  Home    Current Symptoms  None      TCM Call (since 12/6/2021)     Post hospital issues  None    Scheduled for follow up? Yes    Did you obtain your prescribed medications  Yes    Do you need help managing your prescriptions or medications  No    Is transportation to your appointment needed  No    I have advised the patient to call PCP with any new or worsening symptoms  Bethanie Benito    Comments  pt will call office with any questions/issues, return to ED if needed  TCM appt sched

## 2022-01-06 NOTE — ASSESSMENT & PLAN NOTE
Patient describes that she has had gait abnormalities that have been present for 'years'  She now requires a cane to walk  Order given for physical therapy to help with gait dysfunction     Referral to Neurology for work up of etiology   ?NPH due to gait disturbance, cognitive difficulties, and urinary incontinence

## 2022-01-06 NOTE — ASSESSMENT & PLAN NOTE
Has been occurring for 2-3 years  Symptoms nearly everyday  Associated with urgency  No decrease in symptoms with decrease in fluid intake   Referral to urogynecology for evaluation

## 2022-01-06 NOTE — PROGRESS NOTES
Assessment/Plan:    Hyponatremia     Patient was hospitalized for hyponatremia  At discharge her serum sodium was 138  Per the discharge report, the hyponatremia was thought to be multifactorial: low solute intake, high free water intake, and antipsychotic medications  Multiple psychiatric medication changes were made in the hospital  She was also advised to decrease her free water intake  Will order CMP to reassess sodium level at follow up in one month  Other routine labs (lipid, cbc, TSH, HbA1C) will be obtained for review at follow up  Vitamin B 12 deficiency  Stable   Patient received Vitamin b12 in office today  Will continue to monitor     Gait disorder  Patient describes that she has had gait abnormalities that have been present for 'years'  She now requires a cane to walk  Order given for physical therapy to help with gait dysfunction     Referral to Neurology for work up of etiology  ?NPH due to gait disturbance, cognitive difficulties, and urinary incontinence    Urinary incontinence  Has been occurring for 2-3 years  Symptoms nearly everyday  Associated with urgency  No decrease in symptoms with decrease in fluid intake  Referral to urogynecology for evaluation      Bipolar disorder, current episode mixed, mild [facility])  Patient follows with psychiatrist  Medication changes made in recent hospitalization  Has follow up appointment with psychiatrist this month     Influenza vaccine administered  Flu vaccine given at today's appointment       Obesity (BMI 35 0-39 9 without comorbidity)  Routine blood work ordered for next appointment which is in a month        TCM Call (since 12/6/2021)     Date and time call was made  12/28/2021  9:42 AM    Hospital care reviewed  Records reviewed        Patient was hospitialized at  [facility]        Date of Admission  12/17/21    Date of discharge  12/22/21    Diagnosis  hyponatremia    Disposition  Home    Current Symptoms  None      TCM Call (since 12/6/2021)     Post hospital issues  None    Scheduled for follow up? Yes    Did you obtain your prescribed medications  Yes    Do you need help managing your prescriptions or medications  No    Is transportation to your appointment needed  No    I have advised the patient to call PCP with any new or worsening symptoms  Wash Lauren    Comments  pt will call office with any questions/issues, return to ED if needed  TCM appt sched  Problem List Items Addressed This Visit        Other    Morbid obesity with BMI of 40 0-44 9, adult ([facility] )    Mixed hyperlipidemia    Relevant Orders    CBC and differential    Comprehensive metabolic panel    Lipid Panel with Direct LDL reflex    TSH, 3rd generation with Free T4 reflex    Vitamin B 12 deficiency - Primary     Stable   Patient received Vitamin b12 in office today  Will continue to monitor          Relevant Medications    cyanocobalamin injection 1,000 mcg    Gait disorder     Patient describes that she has had gait abnormalities that have been present for 'years'  She now requires a cane to walk  Order given for physical therapy to help with gait dysfunction     Referral to Neurology for work up of etiology  ?NPH due to gait disturbance, cognitive difficulties, and urinary incontinence         Relevant Orders    Ambulatory referral to Physical Therapy    Ambulatory referral to Neurology    Urinary incontinence     Has been occurring for 2-3 years  Symptoms nearly everyday  Associated with urgency  No decrease in symptoms with decrease in fluid intake   Referral to urogynecology for evaluation           Relevant Orders    Ambulatory referral to Urogynecology      Other Visit Diagnoses     Need for influenza vaccination        Relevant Orders    influenza vaccine, quadrivalent, recombinant, PF, 0 5 mL, for patients 18 yr+ (FLUBLOK) (Completed)    Hyperglycemia, unspecified        Relevant Orders    Hemoglobin A1C    Bipolar 2 disorder ([facility] ) Subjective:      Patient ID: Jamse Felty is a 57 y o  female  HPI     Patient presents to the office for follow up for a hospitalization course from 12/17/21 to 12/22/2021 due to hyponatremia  She was admitted to the ICU and her sodium was corrected  Per the discharge summary,it was thought her hyponatremia was multifactorial from low solute intake, high water intake, and antipsychotic medication  Before hospitalization she was drinking 12 bottles of water a day  She notes that she is often thirsty but has been restricted to 48 ounces of water ar discharge  Numerous changes were made to her psychiatric medications  She has an appointment with her psychiatrist for  follow up within the month  Besides the hospitalization, she had concerns about her urinary incontinence, unsteady gait, and cognitive issues that have been a chronic issue for years  She has had urinary incontinence for 2-3 years and experiences associated urgency  For her cognition, she reports she has trouble with memory both short-term and long term as well as issues with language  For example, she noted today she had to take sometime to remember how to spell 'here'  The following portions of the patient's history were reviewed and updated as appropriate: allergies, current medications, past family history, past medical history, past social history, past surgical history and problem list     Review of Systems   Constitutional: Negative for activity change, appetite change and fever  Respiratory: Negative for shortness of breath  Cardiovascular: Negative for chest pain and palpitations  Gastrointestinal: Negative for constipation, diarrhea, nausea and vomiting  Genitourinary: Positive for frequency and urgency  Negative for difficulty urinating and dysuria  Psychiatric/Behavioral: Positive for decreased concentration           Objective:      /78 (BP Location: Left arm, Patient Position: Sitting, Cuff Size: Large)   Pulse 87   Temp 98 2 °F (36 8 °C) (Tympanic)   Resp 16   Ht 5' 2 5" (1 588 m)   Wt 89 8 kg (198 lb)   BMI 35 64 kg/m²          Physical Exam  Vitals and nursing note reviewed  Constitutional:       General: She is not in acute distress  Appearance: Normal appearance  She is obese  She is not ill-appearing or toxic-appearing  HENT:      Head: Normocephalic and atraumatic  Eyes:      Conjunctiva/sclera: Conjunctivae normal    Cardiovascular:      Rate and Rhythm: Normal rate and regular rhythm  Heart sounds: Normal heart sounds  Pulmonary:      Effort: Pulmonary effort is normal  No respiratory distress  Breath sounds: Normal breath sounds  No wheezing, rhonchi or rales  Neurological:      General: No focal deficit present  Mental Status: She is alert        Gait: Gait abnormal    Psychiatric:         Mood and Affect: Mood normal          Behavior: Behavior normal

## 2022-02-01 NOTE — PROGRESS NOTES
Patient was a difficult stick  Three different nurses attempted her IV and the 6th stick was successful  Patient tolerated her venofer well without adverse reaction  Please call patient for appointment.

## 2022-02-03 ENCOUNTER — CLINICAL SUPPORT (OUTPATIENT)
Dept: FAMILY MEDICINE CLINIC | Facility: CLINIC | Age: 62
End: 2022-02-03
Payer: MEDICARE

## 2022-02-03 DIAGNOSIS — E53.8 VITAMIN B 12 DEFICIENCY: Primary | ICD-10-CM

## 2022-02-03 PROCEDURE — 96372 THER/PROPH/DIAG INJ SC/IM: CPT

## 2022-02-03 RX ORDER — CYANOCOBALAMIN 1000 UG/ML
1000 INJECTION INTRAMUSCULAR; SUBCUTANEOUS
Status: SHIPPED | OUTPATIENT
Start: 2022-02-03

## 2022-02-03 RX ADMIN — CYANOCOBALAMIN 1000 MCG: 1000 INJECTION INTRAMUSCULAR; SUBCUTANEOUS at 13:16

## 2022-03-14 ENCOUNTER — OFFICE VISIT (OUTPATIENT)
Dept: FAMILY MEDICINE CLINIC | Facility: CLINIC | Age: 62
End: 2022-03-14
Payer: MEDICARE

## 2022-03-14 VITALS
BODY MASS INDEX: 38.27 KG/M2 | TEMPERATURE: 98.6 F | DIASTOLIC BLOOD PRESSURE: 70 MMHG | HEART RATE: 95 BPM | RESPIRATION RATE: 16 BRPM | WEIGHT: 216 LBS | OXYGEN SATURATION: 97 % | SYSTOLIC BLOOD PRESSURE: 114 MMHG | HEIGHT: 63 IN

## 2022-03-14 DIAGNOSIS — J01.00 ACUTE NON-RECURRENT MAXILLARY SINUSITIS: Primary | ICD-10-CM

## 2022-03-14 DIAGNOSIS — E66.01 CLASS 2 SEVERE OBESITY DUE TO EXCESS CALORIES WITH SERIOUS COMORBIDITY AND BODY MASS INDEX (BMI) OF 38.0 TO 38.9 IN ADULT (HCC): ICD-10-CM

## 2022-03-14 DIAGNOSIS — E53.8 VITAMIN B 12 DEFICIENCY: ICD-10-CM

## 2022-03-14 DIAGNOSIS — K21.9 GERD WITHOUT ESOPHAGITIS: ICD-10-CM

## 2022-03-14 DIAGNOSIS — L30.9 DERMATITIS: ICD-10-CM

## 2022-03-14 PROBLEM — E66.09 OBESITY DUE TO EXCESS CALORIES WITH SERIOUS COMORBIDITY: Status: ACTIVE | Noted: 2020-03-17

## 2022-03-14 PROCEDURE — 99214 OFFICE O/P EST MOD 30 MIN: CPT | Performed by: FAMILY MEDICINE

## 2022-03-14 PROCEDURE — 96372 THER/PROPH/DIAG INJ SC/IM: CPT

## 2022-03-14 RX ORDER — GABAPENTIN 600 MG/1
1200 TABLET ORAL
COMMUNITY
Start: 2022-01-13

## 2022-03-14 RX ORDER — CYANOCOBALAMIN 1000 UG/ML
1000 INJECTION INTRAMUSCULAR; SUBCUTANEOUS
Status: SHIPPED | OUTPATIENT
Start: 2022-03-14

## 2022-03-14 RX ORDER — VORTIOXETINE 10 MG/1
TABLET, FILM COATED ORAL
COMMUNITY
Start: 2022-03-10

## 2022-03-14 RX ORDER — VORTIOXETINE 5 MG/1
TABLET, FILM COATED ORAL
COMMUNITY
Start: 2022-03-10

## 2022-03-14 RX ORDER — PHENTERMINE HYDROCHLORIDE 37.5 MG/1
37.5 CAPSULE ORAL EVERY MORNING
COMMUNITY

## 2022-03-14 RX ORDER — OMEPRAZOLE 40 MG/1
40 CAPSULE, DELAYED RELEASE ORAL DAILY
Qty: 90 CAPSULE | Refills: 1 | Status: SHIPPED | OUTPATIENT
Start: 2022-03-14

## 2022-03-14 RX ORDER — CLOTRIMAZOLE AND BETAMETHASONE DIPROPIONATE 10; .64 MG/G; MG/G
CREAM TOPICAL 2 TIMES DAILY
Qty: 30 G | Refills: 0 | Status: SHIPPED | OUTPATIENT
Start: 2022-03-14

## 2022-03-14 RX ORDER — AZITHROMYCIN 250 MG/1
TABLET, FILM COATED ORAL
Qty: 6 TABLET | Refills: 0 | Status: SHIPPED | OUTPATIENT
Start: 2022-03-14 | End: 2022-03-18

## 2022-03-14 RX ADMIN — CYANOCOBALAMIN 1000 MCG: 1000 INJECTION INTRAMUSCULAR; SUBCUTANEOUS at 13:23

## 2022-03-14 NOTE — PROGRESS NOTES
Assessment/Plan:  GERD without esophagitis  Not well controlled  I am going to increase her pantoprazole to 40 mg daily  Dermatitis  Was given refill for Lotrisone  Acute non-recurrent maxillary sinusitis  Was given prescription for Z-Negro and was told to use Flonase nasal spray  Diagnoses and all orders for this visit:    Acute non-recurrent maxillary sinusitis  -     azithromycin (ZITHROMAX) 250 mg tablet; Take 2 tablets today then 1 tablet daily x 4 days    Class 2 severe obesity due to excess calories with serious comorbidity and body mass index (BMI) of 38 0 to 38 9 in [facility])    Vitamin B 12 deficiency  -     cyanocobalamin injection 1,000 mcg    GERD without esophagitis  -     omeprazole (PriLOSEC) 40 MG capsule; Take 1 capsule (40 mg total) by mouth daily    Dermatitis  Comments:  She was given Lotrisone cream   Orders:  -     clotrimazole-betamethasone (LOTRISONE) 1-0 05 % cream; Apply topically 2 (two) times a day    Other orders  -     gabapentin (NEURONTIN) 600 MG tablet; Take 1,200 mg by mouth daily at bedtime  -     Trintellix 5 MG tablet; take 1 tablet by mouth once daily IN ADDITION TO 10 MG ONCE DAILY DOSING  -     Trintellix 10 MG tablet; take 1 tablet by mouth once daily IN ADDITION TO 5 MG DAILY DOSING  -     phentermine 37 5 MG capsule; Take 37 5 mg by mouth every morning          There are no Patient Instructions on file for this visit  Return if symptoms worsen or fail to improve  Subjective:      Patient ID: Skye Castro is a 57 y o  female  Chief Complaint   Patient presents with    B12 Injection    Cold Like Symptoms       She is here today with upper respiratory symptoms including nasal congestion, postnasal drip and sinus pressure  Also complained of left ear fullness on and off  Denies any chest pain or shortness of breath  Also c/o worsening GERD symptoms        The following portions of the patient's history were reviewed and updated as appropriate: allergies, current medications, past family history, past medical history, past social history, past surgical history and problem list     Review of Systems   Constitutional: Negative for activity change, chills and fever  HENT: Positive for congestion, postnasal drip, rhinorrhea and sinus pressure  Negative for trouble swallowing  Eyes: Negative for visual disturbance  Respiratory: Negative for apnea, cough and shortness of breath  Cardiovascular: Negative for chest pain, palpitations and leg swelling  Gastrointestinal: Negative for abdominal pain, constipation, diarrhea and vomiting  GERD symptoms worsening   Endocrine: Negative for cold intolerance and heat intolerance  Genitourinary: Negative for difficulty urinating and dysuria  Musculoskeletal: Negative for gait problem  Skin: Positive for rash  Neurological: Negative for dizziness, tremors, seizures and headaches  Hematological: Negative for adenopathy  Psychiatric/Behavioral: Negative for behavioral problems           Current Outpatient Medications   Medication Sig Dispense Refill    buprenorphine (Butrans) 15 mcg/hr Place 1 patch on the skin once a week      busPIRone (BUSPAR) 15 mg tablet Take 15 mg by mouth 2 (two) times a day      clotrimazole-betamethasone (LOTRISONE) 1-0 05 % cream Apply topically 2 (two) times a day 30 g 0    gabapentin (NEURONTIN) 600 MG tablet Take 1,200 mg by mouth daily at bedtime      hydrOXYzine HCL (ATARAX) 50 mg tablet Take 50 mg by mouth daily at bedtime    0    levocetirizine (XYZAL) 5 MG tablet Take 1 tablet (5 mg total) by mouth every evening 90 tablet 1    oxyCODONE-acetaminophen (PERCOCET) 5-325 mg per tablet Take 1 tablet by mouth every 6 (six) hours as needed        phentermine 37 5 MG capsule Take 37 5 mg by mouth every morning      QUEtiapine (SEROquel) 400 MG tablet Take 800 mg by mouth daily at bedtime  0    tiZANidine (ZANAFLEX) 4 mg tablet Take 4 mg by mouth 3 (three) times a day  0    traMADol (ULTRAM) 50 mg tablet Take 1 tablet daily as needed 30 tablet 0    Trintellix 10 MG tablet take 1 tablet by mouth once daily IN ADDITION TO 5 MG DAILY DOSING      Trintellix 5 MG tablet take 1 tablet by mouth once daily IN ADDITION TO 10 MG ONCE DAILY DOSING      acetaminophen (TYLENOL) 650 mg CR tablet 1 as directed for pain      ARIPiprazole (ABILIFY) 10 mg tablet  (Patient not taking: Reported on 1/6/2022 )      azithromycin (ZITHROMAX) 250 mg tablet Take 2 tablets today then 1 tablet daily x 4 days 6 tablet 0    buPROPion (WELLBUTRIN) 100 mg tablet Take 200 mg by mouth 2 (two) times a day  (Patient not taking: Reported on 3/14/2022 )      DULoxetine (CYMBALTA) 30 mg delayed release capsule  (Patient not taking: Reported on 1/6/2022 )      DULoxetine (CYMBALTA) 60 mg delayed release capsule Take 120 mg by mouth daily   (Patient not taking: Reported on 3/14/2022 )      gabapentin (NEURONTIN) 800 mg tablet Take 1,600 mg by mouth daily at bedtime  (Patient not taking: Reported on 3/14/2022 )  0    nystatin (MYCOSTATIN) cream Apply topically 2 (two) times a day 30 g 2    omeprazole (PriLOSEC) 40 MG capsule Take 1 capsule (40 mg total) by mouth daily 90 capsule 1    polyethylene glycol (COLYTE) 4000 mL solution Take 240 g by mouth once for 1 dose 4000 mL 0    Rexulti 3 MG tablet Take 3 mg by mouth daily (Patient not taking: Reported on 10/19/2021)      traZODone (DESYREL) 100 mg tablet Take 100 mg by mouth daily at bedtime (Patient not taking: Reported on 3/14/2022 )       Current Facility-Administered Medications   Medication Dose Route Frequency Provider Last Rate Last Admin    cyanocobalamin injection 1,000 mcg  1,000 mcg Intramuscular Q30 Days Franci Vergara PA-C   1,000 mcg at 04/15/21 1415    cyanocobalamin injection 1,000 mcg  1,000 mcg Intramuscular Q30 Days Franci Vergara PA-C   1,000 mcg at 05/18/21 1408    cyanocobalamin injection 1,000 mcg  1,000 mcg Intramuscular Q30 Days Mona Sherman, CRNP   1,000 mcg at 06/18/21 1325    cyanocobalamin injection 1,000 mcg  1,000 mcg Intramuscular Q30 Days Mona Sherman, CRNP   1,000 mcg at 08/31/21 1045    cyanocobalamin injection 1,000 mcg  1,000 mcg Intramuscular Q30 Days Mona Sherman, CRNP   1,000 mcg at 09/24/21 1157    cyanocobalamin injection 1,000 mcg  1,000 mcg Intramuscular Q30 Days Mona Sherman, CRNP   1,000 mcg at 10/27/21 1118    cyanocobalamin injection 1,000 mcg  1,000 mcg Intramuscular Q30 Days Nora Valentin MD   1,000 mcg at 01/06/22 1359    cyanocobalamin injection 1,000 mcg  1,000 mcg Intramuscular Q30 Days Nora Valentin MD   1,000 mcg at 02/03/22 1316    cyanocobalamin injection 1,000 mcg  1,000 mcg Intramuscular Q30 Days Nora Valentin MD   1,000 mcg at 03/14/22 1323       Objective:    /70 (BP Location: Left arm, Patient Position: Sitting, Cuff Size: Large)   Pulse 95   Temp 98 6 °F (37 °C) (Tympanic)   Resp 16   Ht 5' 2 5" (1 588 m)   Wt 98 kg (216 lb)   SpO2 97%   BMI 38 88 kg/m²        Physical Exam  Vitals and nursing note reviewed  Constitutional:       Appearance: She is well-developed  HENT:      Head: Normocephalic and atraumatic  Right Ear: A middle ear effusion is present  Left Ear: A middle ear effusion is present  Mouth/Throat:      Pharynx: Posterior oropharyngeal erythema present  Eyes:      Pupils: Pupils are equal, round, and reactive to light  Cardiovascular:      Rate and Rhythm: Normal rate and regular rhythm  Heart sounds: Normal heart sounds  Pulmonary:      Effort: Pulmonary effort is normal       Breath sounds: Normal breath sounds  Abdominal:      General: Bowel sounds are normal       Palpations: Abdomen is soft  Musculoskeletal:         General: Normal range of motion  Cervical back: Normal range of motion and neck supple  Lymphadenopathy:      Cervical: No cervical adenopathy     Skin:     General: Skin is warm and dry  Findings: Rash present  Neurological:      Mental Status: She is alert and oriented to person, place, and time  Cranial Nerves: No cranial nerve deficit                  Eldridge Olszewski, MD

## 2022-03-23 ENCOUNTER — OFFICE VISIT (OUTPATIENT)
Dept: FAMILY MEDICINE CLINIC | Facility: CLINIC | Age: 62
End: 2022-03-23
Payer: MEDICARE

## 2022-03-23 VITALS
WEIGHT: 216 LBS | HEIGHT: 63 IN | TEMPERATURE: 97.5 F | HEART RATE: 86 BPM | DIASTOLIC BLOOD PRESSURE: 80 MMHG | OXYGEN SATURATION: 96 % | BODY MASS INDEX: 38.27 KG/M2 | SYSTOLIC BLOOD PRESSURE: 120 MMHG | RESPIRATION RATE: 16 BRPM

## 2022-03-23 DIAGNOSIS — J30.1 SEASONAL ALLERGIC RHINITIS DUE TO POLLEN: ICD-10-CM

## 2022-03-23 DIAGNOSIS — H61.22 IMPACTED CERUMEN OF LEFT EAR: Primary | ICD-10-CM

## 2022-03-23 PROCEDURE — 69210 REMOVE IMPACTED EAR WAX UNI: CPT | Performed by: NURSE PRACTITIONER

## 2022-03-23 PROCEDURE — 99214 OFFICE O/P EST MOD 30 MIN: CPT | Performed by: NURSE PRACTITIONER

## 2022-03-23 NOTE — PROGRESS NOTES
Assessment/Plan:    Seasonal allergic rhinitis due to pollen  - Stable  - Continue Xyzal daily  - Will continue to monitor  Impacted cerumen of left ear  - Removed via curette  No complications noted  Hearing improved  - See procedure note for complete documentation  Diagnoses and all orders for this visit:    Impacted cerumen of left ear    Seasonal allergic rhinitis due to pollen        Subjective:      Patient ID: Adebayo Dawson is a 57 y o  female  Patient presents today for ear wax removal  She was taking over the counter ear drops for the past week  She was also diagnosed with a sinus infection last week and was given a z-alena  She finished the antibiotic on Saturday  Ear cerumen removal    Date/Time: 3/23/2022 2:20 PM  Performed by: WHITNEY Alvarez  Authorized by: WHITNEY Alvarez   Universal Protocol:  Consent: Verbal consent obtained  Consent given by: patient  Patient understanding: patient states understanding of the procedure being performed  Patient identity confirmed: verbally with patient      Patient location:  Clinic  Procedure details:     Location:  L ear    Procedure type: curette      Approach:  External  Post-procedure details:     Complication:  None    Hearing quality:  Improved    Patient tolerance of procedure: Tolerated well, no immediate complications        The following portions of the patient's history were reviewed and updated as appropriate: allergies, current medications, past family history, past medical history, past social history, past surgical history and problem list     Review of Systems   Constitutional: Negative for fatigue and fever  HENT: Positive for ear pain  Negative for trouble swallowing  Eyes: Negative for visual disturbance  Respiratory: Negative for cough and shortness of breath  Cardiovascular: Negative for chest pain and palpitations  Gastrointestinal: Negative for abdominal pain and blood in stool  Endocrine: Negative for cold intolerance and heat intolerance  Genitourinary: Negative for difficulty urinating and dysuria  Musculoskeletal: Negative for gait problem  Skin: Negative for rash  Neurological: Negative for dizziness, syncope and headaches  Hematological: Negative for adenopathy  Psychiatric/Behavioral: Negative for behavioral problems  Objective:      /80 (BP Location: Left arm, Patient Position: Sitting, Cuff Size: Large)   Pulse 86   Temp 97 5 °F (36 4 °C) (Tympanic)   Resp 16   Ht 5' 2 5" (1 588 m)   Wt 98 kg (216 lb)   SpO2 96%   BMI 38 88 kg/m²          Physical Exam  Vitals and nursing note reviewed  Constitutional:       Appearance: Normal appearance  HENT:      Head: Normocephalic and atraumatic  Right Ear: Tympanic membrane, ear canal and external ear normal       Left Ear: External ear normal  Decreased hearing noted  There is impacted cerumen  Eyes:      Conjunctiva/sclera: Conjunctivae normal    Cardiovascular:      Rate and Rhythm: Normal rate and regular rhythm  Heart sounds: Normal heart sounds  Pulmonary:      Effort: Pulmonary effort is normal       Breath sounds: Normal breath sounds  Musculoskeletal:         General: Normal range of motion  Cervical back: Normal range of motion  Skin:     General: Skin is warm and dry  Neurological:      Mental Status: She is alert and oriented to person, place, and time  Cranial Nerves: No cranial nerve deficit     Psychiatric:         Mood and Affect: Mood normal          Behavior: Behavior normal

## 2022-03-23 NOTE — ASSESSMENT & PLAN NOTE
- Removed via curette  No complications noted  Hearing improved  - See procedure note for complete documentation

## 2022-04-19 ENCOUNTER — CLINICAL SUPPORT (OUTPATIENT)
Dept: FAMILY MEDICINE CLINIC | Facility: CLINIC | Age: 62
End: 2022-04-19
Payer: MEDICARE

## 2022-04-19 DIAGNOSIS — E53.8 VITAMIN B 12 DEFICIENCY: Primary | ICD-10-CM

## 2022-04-19 PROCEDURE — 96372 THER/PROPH/DIAG INJ SC/IM: CPT

## 2022-04-19 RX ORDER — CYANOCOBALAMIN 1000 UG/ML
1000 INJECTION INTRAMUSCULAR; SUBCUTANEOUS
Status: SHIPPED | OUTPATIENT
Start: 2022-04-19

## 2022-04-19 RX ADMIN — CYANOCOBALAMIN 1000 MCG: 1000 INJECTION INTRAMUSCULAR; SUBCUTANEOUS at 13:08

## 2022-05-18 ENCOUNTER — CLINICAL SUPPORT (OUTPATIENT)
Dept: FAMILY MEDICINE CLINIC | Facility: CLINIC | Age: 62
End: 2022-05-18
Payer: MEDICARE

## 2022-05-18 DIAGNOSIS — E53.8 VITAMIN B 12 DEFICIENCY: Primary | ICD-10-CM

## 2022-05-18 PROCEDURE — 96372 THER/PROPH/DIAG INJ SC/IM: CPT

## 2022-05-18 RX ORDER — CYANOCOBALAMIN 1000 UG/ML
1000 INJECTION INTRAMUSCULAR; SUBCUTANEOUS
Status: SHIPPED | OUTPATIENT
Start: 2022-05-18

## 2022-05-18 RX ADMIN — CYANOCOBALAMIN 1000 MCG: 1000 INJECTION INTRAMUSCULAR; SUBCUTANEOUS at 15:08

## 2022-06-20 ENCOUNTER — CLINICAL SUPPORT (OUTPATIENT)
Dept: FAMILY MEDICINE CLINIC | Facility: CLINIC | Age: 62
End: 2022-06-20
Payer: MEDICARE

## 2022-06-20 DIAGNOSIS — E53.8 VITAMIN B 12 DEFICIENCY: Primary | ICD-10-CM

## 2022-06-20 PROCEDURE — 96372 THER/PROPH/DIAG INJ SC/IM: CPT

## 2022-06-20 RX ORDER — CYANOCOBALAMIN 1000 UG/ML
1000 INJECTION INTRAMUSCULAR; SUBCUTANEOUS
Status: SHIPPED | OUTPATIENT
Start: 2022-06-20

## 2022-06-20 RX ADMIN — CYANOCOBALAMIN 1000 MCG: 1000 INJECTION INTRAMUSCULAR; SUBCUTANEOUS at 13:03

## 2022-06-20 NOTE — PROGRESS NOTES
Patient was in office for nurse visit for vitamin b12 injection  Patient received injection in left deltoid

## 2022-06-23 DIAGNOSIS — J30.1 SEASONAL ALLERGIC RHINITIS DUE TO POLLEN: ICD-10-CM

## 2022-06-23 RX ORDER — LEVOCETIRIZINE DIHYDROCHLORIDE 5 MG/1
5 TABLET, FILM COATED ORAL EVERY EVENING
Qty: 90 TABLET | Refills: 1 | Status: SHIPPED | OUTPATIENT
Start: 2022-06-23

## 2022-07-22 ENCOUNTER — CLINICAL SUPPORT (OUTPATIENT)
Dept: FAMILY MEDICINE CLINIC | Facility: CLINIC | Age: 62
End: 2022-07-22
Payer: MEDICARE

## 2022-07-22 DIAGNOSIS — E53.8 VITAMIN B 12 DEFICIENCY: Primary | ICD-10-CM

## 2022-07-22 PROCEDURE — 96372 THER/PROPH/DIAG INJ SC/IM: CPT

## 2022-07-22 RX ORDER — CYANOCOBALAMIN 1000 UG/ML
1000 INJECTION, SOLUTION INTRAMUSCULAR; SUBCUTANEOUS
Status: SHIPPED | OUTPATIENT
Start: 2022-07-22

## 2022-07-22 RX ADMIN — CYANOCOBALAMIN 1000 MCG: 1000 INJECTION, SOLUTION INTRAMUSCULAR; SUBCUTANEOUS at 13:14

## 2022-08-22 ENCOUNTER — CLINICAL SUPPORT (OUTPATIENT)
Dept: FAMILY MEDICINE CLINIC | Facility: CLINIC | Age: 62
End: 2022-08-22
Payer: MEDICARE

## 2022-08-22 DIAGNOSIS — E53.8 VITAMIN B 12 DEFICIENCY: Primary | ICD-10-CM

## 2022-08-22 PROCEDURE — 96372 THER/PROPH/DIAG INJ SC/IM: CPT

## 2022-08-22 RX ORDER — CYANOCOBALAMIN 1000 UG/ML
1000 INJECTION, SOLUTION INTRAMUSCULAR; SUBCUTANEOUS
Status: SHIPPED | OUTPATIENT
Start: 2022-08-22

## 2022-08-22 RX ADMIN — CYANOCOBALAMIN 1000 MCG: 1000 INJECTION, SOLUTION INTRAMUSCULAR; SUBCUTANEOUS at 13:11

## 2022-09-19 ENCOUNTER — CLINICAL SUPPORT (OUTPATIENT)
Dept: FAMILY MEDICINE CLINIC | Facility: CLINIC | Age: 62
End: 2022-09-19
Payer: MEDICARE

## 2022-09-19 DIAGNOSIS — E53.8 VITAMIN B 12 DEFICIENCY: Primary | ICD-10-CM

## 2022-09-19 PROCEDURE — 96372 THER/PROPH/DIAG INJ SC/IM: CPT

## 2022-09-19 RX ORDER — CYANOCOBALAMIN 1000 UG/ML
1000 INJECTION, SOLUTION INTRAMUSCULAR; SUBCUTANEOUS
Status: SHIPPED | OUTPATIENT
Start: 2022-09-19

## 2022-09-19 RX ADMIN — CYANOCOBALAMIN 1000 MCG: 1000 INJECTION, SOLUTION INTRAMUSCULAR; SUBCUTANEOUS at 13:24

## 2022-10-11 PROBLEM — J01.00 ACUTE NON-RECURRENT MAXILLARY SINUSITIS: Status: RESOLVED | Noted: 2022-03-14 | Resolved: 2022-10-11

## 2022-10-11 PROBLEM — H61.22 IMPACTED CERUMEN OF LEFT EAR: Status: RESOLVED | Noted: 2022-03-23 | Resolved: 2022-10-11

## 2022-10-24 ENCOUNTER — CLINICAL SUPPORT (OUTPATIENT)
Dept: FAMILY MEDICINE CLINIC | Facility: CLINIC | Age: 62
End: 2022-10-24
Payer: MEDICARE

## 2022-10-24 DIAGNOSIS — E53.8 VITAMIN B 12 DEFICIENCY: Primary | ICD-10-CM

## 2022-10-24 DIAGNOSIS — L30.9 DERMATITIS: ICD-10-CM

## 2022-10-24 PROCEDURE — 96372 THER/PROPH/DIAG INJ SC/IM: CPT

## 2022-10-24 RX ORDER — CLOTRIMAZOLE AND BETAMETHASONE DIPROPIONATE 10; .64 MG/G; MG/G
CREAM TOPICAL 2 TIMES DAILY
Qty: 30 G | Refills: 1 | Status: SHIPPED | OUTPATIENT
Start: 2022-10-24

## 2022-10-24 RX ORDER — CYANOCOBALAMIN 1000 UG/ML
1000 INJECTION, SOLUTION INTRAMUSCULAR; SUBCUTANEOUS
Status: SHIPPED | OUTPATIENT
Start: 2022-10-24

## 2022-10-24 RX ADMIN — CYANOCOBALAMIN 1000 MCG: 1000 INJECTION, SOLUTION INTRAMUSCULAR; SUBCUTANEOUS at 13:25

## 2022-11-15 ENCOUNTER — APPOINTMENT (OUTPATIENT)
Dept: LAB | Age: 62
End: 2022-11-15

## 2022-11-15 ENCOUNTER — OFFICE VISIT (OUTPATIENT)
Dept: FAMILY MEDICINE CLINIC | Facility: CLINIC | Age: 62
End: 2022-11-15

## 2022-11-15 VITALS
SYSTOLIC BLOOD PRESSURE: 110 MMHG | OXYGEN SATURATION: 98 % | RESPIRATION RATE: 16 BRPM | HEIGHT: 63 IN | BODY MASS INDEX: 33.84 KG/M2 | HEART RATE: 93 BPM | DIASTOLIC BLOOD PRESSURE: 74 MMHG | TEMPERATURE: 97.8 F | WEIGHT: 191 LBS

## 2022-11-15 DIAGNOSIS — Z12.31 ENCOUNTER FOR SCREENING MAMMOGRAM FOR MALIGNANT NEOPLASM OF BREAST: ICD-10-CM

## 2022-11-15 DIAGNOSIS — E66.01 CLASS 3 SEVERE OBESITY IN ADULT, UNSPECIFIED BMI, UNSPECIFIED OBESITY TYPE, UNSPECIFIED WHETHER SERIOUS COMORBIDITY PRESENT (HCC): ICD-10-CM

## 2022-11-15 DIAGNOSIS — E55.9 AVITAMINOSIS D: ICD-10-CM

## 2022-11-15 DIAGNOSIS — R73.02 IMPAIRED GLUCOSE TOLERANCE ASSOCIATED WITH DRUGS: ICD-10-CM

## 2022-11-15 DIAGNOSIS — E78.2 MIXED HYPERLIPIDEMIA: ICD-10-CM

## 2022-11-15 DIAGNOSIS — R53.83 OTHER FATIGUE: ICD-10-CM

## 2022-11-15 DIAGNOSIS — E78.5 HYPERLIPIDEMIA, UNSPECIFIED HYPERLIPIDEMIA TYPE: ICD-10-CM

## 2022-11-15 DIAGNOSIS — J30.1 SEASONAL ALLERGIC RHINITIS DUE TO POLLEN: ICD-10-CM

## 2022-11-15 DIAGNOSIS — Z00.00 HEALTHCARE MAINTENANCE: ICD-10-CM

## 2022-11-15 DIAGNOSIS — Z12.11 COLON CANCER SCREENING: ICD-10-CM

## 2022-11-15 DIAGNOSIS — E66.3 SEVERELY OVERWEIGHT: ICD-10-CM

## 2022-11-15 DIAGNOSIS — E13.69 OTHER SPECIFIED DIABETES MELLITUS WITH OTHER SPECIFIED COMPLICATION, UNSPECIFIED WHETHER LONG TERM INSULIN USE (HCC): ICD-10-CM

## 2022-11-15 DIAGNOSIS — G47.00 INSOMNIA, UNSPECIFIED TYPE: ICD-10-CM

## 2022-11-15 DIAGNOSIS — E51.9 THIAMIN DEFICIENCY: ICD-10-CM

## 2022-11-15 DIAGNOSIS — Z00.00 HEALTHCARE MAINTENANCE: Primary | ICD-10-CM

## 2022-11-15 DIAGNOSIS — R73.9 HYPERGLYCEMIA, UNSPECIFIED: ICD-10-CM

## 2022-11-15 DIAGNOSIS — K21.9 GERD WITHOUT ESOPHAGITIS: ICD-10-CM

## 2022-11-15 DIAGNOSIS — D51.8 OTHER VITAMIN B12 DEFICIENCY ANEMIA: ICD-10-CM

## 2022-11-15 DIAGNOSIS — D50.9 IRON DEFICIENCY ANEMIA, UNSPECIFIED IRON DEFICIENCY ANEMIA TYPE: ICD-10-CM

## 2022-11-15 DIAGNOSIS — Z23 NEED FOR INFLUENZA VACCINATION: ICD-10-CM

## 2022-11-15 DIAGNOSIS — T50.905A IMPAIRED GLUCOSE TOLERANCE ASSOCIATED WITH DRUGS: ICD-10-CM

## 2022-11-15 LAB
25(OH)D3 SERPL-MCNC: 24.3 NG/ML (ref 30–100)
ALBUMIN SERPL BCP-MCNC: 3.6 G/DL (ref 3.5–5)
ALP SERPL-CCNC: 156 U/L (ref 46–116)
ALT SERPL W P-5'-P-CCNC: 22 U/L (ref 12–78)
ANION GAP SERPL CALCULATED.3IONS-SCNC: 5 MMOL/L (ref 4–13)
AST SERPL W P-5'-P-CCNC: 32 U/L (ref 5–45)
BASOPHILS # BLD AUTO: 0.04 THOUSANDS/ÂΜL (ref 0–0.1)
BASOPHILS NFR BLD AUTO: 1 % (ref 0–1)
BILIRUB SERPL-MCNC: 0.48 MG/DL (ref 0.2–1)
BUN SERPL-MCNC: 6 MG/DL (ref 5–25)
CALCIUM SERPL-MCNC: 8.9 MG/DL (ref 8.3–10.1)
CHLORIDE SERPL-SCNC: 106 MMOL/L (ref 96–108)
CHOLEST SERPL-MCNC: 155 MG/DL
CO2 SERPL-SCNC: 23 MMOL/L (ref 21–32)
CREAT SERPL-MCNC: 1.08 MG/DL (ref 0.6–1.3)
EOSINOPHIL # BLD AUTO: 0.04 THOUSAND/ÂΜL (ref 0–0.61)
EOSINOPHIL NFR BLD AUTO: 1 % (ref 0–6)
ERYTHROCYTE [DISTWIDTH] IN BLOOD BY AUTOMATED COUNT: 14.5 % (ref 11.6–15.1)
FERRITIN SERPL-MCNC: 137 NG/ML (ref 8–388)
GFR SERPL CREATININE-BSD FRML MDRD: 55 ML/MIN/1.73SQ M
GLUCOSE P FAST SERPL-MCNC: 92 MG/DL (ref 65–99)
HCT VFR BLD AUTO: 38.1 % (ref 34.8–46.1)
HDLC SERPL-MCNC: 67 MG/DL
HGB BLD-MCNC: 12 G/DL (ref 11.5–15.4)
IMM GRANULOCYTES # BLD AUTO: 0.01 THOUSAND/UL (ref 0–0.2)
IMM GRANULOCYTES NFR BLD AUTO: 0 % (ref 0–2)
IRON SATN MFR SERPL: 22 % (ref 15–50)
IRON SERPL-MCNC: 68 UG/DL (ref 50–170)
LDLC SERPL CALC-MCNC: 74 MG/DL (ref 0–100)
LYMPHOCYTES # BLD AUTO: 2.55 THOUSANDS/ÂΜL (ref 0.6–4.47)
LYMPHOCYTES NFR BLD AUTO: 39 % (ref 14–44)
MCH RBC QN AUTO: 28.8 PG (ref 26.8–34.3)
MCHC RBC AUTO-ENTMCNC: 31.5 G/DL (ref 31.4–37.4)
MCV RBC AUTO: 92 FL (ref 82–98)
MONOCYTES # BLD AUTO: 0.63 THOUSAND/ÂΜL (ref 0.17–1.22)
MONOCYTES NFR BLD AUTO: 10 % (ref 4–12)
NEUTROPHILS # BLD AUTO: 3.25 THOUSANDS/ÂΜL (ref 1.85–7.62)
NEUTS SEG NFR BLD AUTO: 49 % (ref 43–75)
NRBC BLD AUTO-RTO: 0 /100 WBCS
PLATELET # BLD AUTO: 305 THOUSANDS/UL (ref 149–390)
PMV BLD AUTO: 10.3 FL (ref 8.9–12.7)
POTASSIUM SERPL-SCNC: 4.5 MMOL/L (ref 3.5–5.3)
PROT SERPL-MCNC: 6.9 G/DL (ref 6.4–8.4)
RBC # BLD AUTO: 4.16 MILLION/UL (ref 3.81–5.12)
SODIUM SERPL-SCNC: 134 MMOL/L (ref 135–147)
TIBC SERPL-MCNC: 306 UG/DL (ref 250–450)
TRIGL SERPL-MCNC: 68 MG/DL
TSH SERPL DL<=0.05 MIU/L-ACNC: 2.1 UIU/ML (ref 0.45–4.5)
VIT B12 SERPL-MCNC: 870 PG/ML (ref 100–900)
WBC # BLD AUTO: 6.52 THOUSAND/UL (ref 4.31–10.16)

## 2022-11-15 RX ORDER — OMEPRAZOLE 40 MG/1
40 CAPSULE, DELAYED RELEASE ORAL DAILY
Qty: 90 CAPSULE | Refills: 1 | Status: SHIPPED | OUTPATIENT
Start: 2022-11-15

## 2022-11-15 NOTE — ASSESSMENT & PLAN NOTE
- Well controlled on Prilosec daily  Continue same    - Avoid triggering food and beverage    - Will continue to monitor

## 2022-11-15 NOTE — ASSESSMENT & PLAN NOTE
- Reviewed immunizations and screenings  - Flu shot given in office today  - Does not see a dentist routinely  - UTD with eye and GYN exam    - Mammogram and colonoscopy ordered in addition to routine labs

## 2022-11-15 NOTE — ASSESSMENT & PLAN NOTE
- Well controlled as of last lipid panel    - Discussed healthy diet and regular exercise  - Will obtain updated fasting lipid panel

## 2022-11-15 NOTE — PROGRESS NOTES
Assessment and Plan:     Problem List Items Addressed This Visit        Digestive    GERD without esophagitis     - Well controlled on Prilosec daily  Continue same    - Avoid triggering food and beverage    - Will continue to monitor  Relevant Medications    omeprazole (PriLOSEC) 40 MG capsule       Respiratory    Seasonal allergic rhinitis due to pollen     - Well controlled with use of Xyzal  Continue same    - Will continue to monitor  Other    Insomnia     - Well controlled on current medication regimen  Continue same    - Will continue to monitor  Mixed hyperlipidemia     - Well controlled as of last lipid panel    - Discussed healthy diet and regular exercise  - Will obtain updated fasting lipid panel  Relevant Orders    Lipid Panel with Direct LDL reflex    Breast cancer screening    Relevant Orders    Mammo screening bilateral w 3d & cad    Healthcare maintenance - Primary     - Reviewed immunizations and screenings  - Flu shot given in office today  - Does not see a dentist routinely  - UTD with eye and GYN exam    - Mammogram and colonoscopy ordered in addition to routine labs  Relevant Orders    CBC and differential    Comprehensive metabolic panel    Lipid Panel with Direct LDL reflex    TSH, 3rd generation with Free T4 reflex      Other Visit Diagnoses     Need for influenza vaccination        Relevant Orders    influenza vaccine, quadrivalent, recombinant, PF, 0 5 mL, for patients 18 yr+ (FLUBLOK) (Completed)    Colon cancer screening        Relevant Orders    Ambulatory Referral to General Surgery           Preventive health issues were discussed with patient, and age appropriate screening tests were ordered as noted in patient's After Visit Summary  Personalized health advice and appropriate referrals for health education or preventive services given if needed, as noted in patient's After Visit Summary       History of Present Illness: Patient presents for a Medicare Wellness Visit    Patient with PMH of GERD, seasonal allergies, hyperlipidemia, insomnia, and depression presents today for annual wellness physical  She is taking her prescribed medications and reports no side effects  She has complaints today of abdominal pain and diarrhea  States she does have a history of IBS but would like to be checked for Celiac disease  She has never had a colonoscopy but did have a negative Cologuard in 2020  Patient Care Team:  Makayla Mora as PCP - General (Nurse Practitioner)     Review of Systems:     Review of Systems   Constitutional: Negative for fatigue and fever  HENT: Negative for congestion, rhinorrhea and trouble swallowing  Eyes: Negative for visual disturbance  Respiratory: Negative for cough and shortness of breath  Cardiovascular: Negative for chest pain and palpitations  Gastrointestinal: Negative for abdominal pain and blood in stool  Endocrine: Negative for cold intolerance and heat intolerance  Genitourinary: Negative for difficulty urinating, dysuria and frequency  Musculoskeletal: Negative for gait problem  Skin: Negative for rash  Neurological: Negative for dizziness, syncope and headaches  Hematological: Negative for adenopathy  Psychiatric/Behavioral: Negative for behavioral problems          Problem List:     Patient Active Problem List   Diagnosis   • Arthropathy   • Neck pain   • GERD without esophagitis   • Insomnia   • Obesity (BMI 35 0-39 9 without comorbidity)   • Cervical stenosis of spinal canal   • Vitamin B-complex deficiency   • Dermatitis   • Pre-op examination   • Chronic pain of left knee   • Postgastrectomy malabsorption   • Iron deficiency anemia secondary to inadequate dietary iron intake   • Screening for breast cancer   • Screening for colon cancer   • Yeast infection of the skin   • Stress incontinence   • Bipolar disorder, current episode mixed, mild ([address] )   • History of subarachnoid hemorrhage   • Cervical spondylosis   • Seasonal allergic rhinitis due to pollen   • Obesity due to excess calories with serious comorbidity   • Medicare annual wellness visit, subsequent   • Mixed hyperlipidemia   • History of colon polyps   • Breast cancer screening   • Chronic bilateral low back pain with sciatica   • Other fatigue   • Snoring   • Left leg pain   • History of total knee replacement, left   • Left thigh pain   • Right foot pain   • Pigmented skin lesions   • Splinter in skin   • Vitamin B 12 deficiency   • Balance problem   • Numbness and tingling of both feet   • Gait disorder   • Urinary incontinence   • Influenza vaccine administered   • Healthcare maintenance      Past Medical and Surgical History:     Past Medical History:   Diagnosis Date   • Bipolar affective disorder, depressed ([facility] [address] )    • Colon polyp    • GERD (gastroesophageal reflux disease)    • Heart murmur    • Osteoarthritis    • Psychiatric disorder    • Subdural hematoma    • Vitamin B12 deficiency      Past Surgical History:   Procedure Laterality Date   • BREAST LUMPECTOMY Right     benign   • CERVICAL SPINE SURGERY  2002   • CHOLECYSTECTOMY  05/1985   • FOOT FRACTURE SURGERY Right 2012    outcome - good   • FOOT SURGERY Left    • GASTRIC BYPASS  2004    x2- 2nd bypass 10/12   • HYSTERECTOMY      age 54   • KNEE SURGERY Right 01/30/2020      Family History:     Family History   Problem Relation Age of Onset   • Diabetes Father    • Heart disease Father    • Coronary artery disease Family    • Heart attack Family    • No Known Problems Mother    • Breast cancer Maternal Grandmother    • Breast cancer Maternal Grandfather    • Breast cancer Paternal Grandmother    • Breast cancer Paternal Grandfather    • Breast cancer Half-Sister 77   • Breast cancer Paternal [de-identified]    • Breast cancer Paternal Aunt    • Colon cancer Maternal Uncle    • No Known Problems Son       Social History:     Social History Socioeconomic History   • Marital status:      Spouse name: None   • Number of children: None   • Years of education: None   • Highest education level: None   Occupational History   • None   Tobacco Use   • Smoking status: Never Smoker   • Smokeless tobacco: Never Used   • Tobacco comment: passive smoke exposure   Vaping Use   • Vaping Use: Never used   Substance and Sexual Activity   • Alcohol use:  Yes   • Drug use: No   • Sexual activity: None   Other Topics Concern   • None   Social History Narrative   • None     Social Determinants of Health     Financial Resource Strain: Not on file   Food Insecurity: Not on file   Transportation Needs: Not on file   Physical Activity: Not on file   Stress: Not on file   Social Connections: Not on file   Intimate Partner Violence: Not on file   Housing Stability: Not on file      Medications and Allergies:     Current Outpatient Medications   Medication Sig Dispense Refill   • acetaminophen (TYLENOL) 650 mg CR tablet 1 as directed for pain     • buprenorphine (BUTRANS) 15 mcg/hr Place 1 patch on the skin once a week     • clotrimazole-betamethasone (LOTRISONE) 1-0 05 % cream Apply topically 2 (two) times a day 30 g 1   • gabapentin (NEURONTIN) 600 MG tablet Take 1,200 mg by mouth daily at bedtime     • hydrOXYzine HCL (ATARAX) 50 mg tablet Take 50 mg by mouth daily at bedtime    0   • levocetirizine (XYZAL) 5 MG tablet Take 1 tablet (5 mg total) by mouth every evening 90 tablet 1   • nystatin (MYCOSTATIN) cream Apply topically 2 (two) times a day 30 g 2   • omeprazole (PriLOSEC) 40 MG capsule Take 1 capsule (40 mg total) by mouth daily 90 capsule 1   • phentermine 37 5 MG capsule Take 37 5 mg by mouth every morning     • QUEtiapine (SEROquel) 400 MG tablet Take 800 mg by mouth daily at bedtime  0   • tiZANidine (ZANAFLEX) 4 mg tablet Take 4 mg by mouth 3 (three) times a day  0   • traMADol (ULTRAM) 50 mg tablet Take 1 tablet daily as needed 30 tablet 0   • Trintellix 10 MG tablet take 1 tablet by mouth once daily IN ADDITION TO 5 MG DAILY DOSING     • ARIPiprazole (ABILIFY) 10 mg tablet  (Patient not taking: Reported on 1/6/2022 )     • buPROPion (WELLBUTRIN) 100 mg tablet Take 200 mg by mouth 2 (two) times a day  (Patient not taking: Reported on 3/14/2022 )     • busPIRone (BUSPAR) 15 mg tablet Take 15 mg by mouth 2 (two) times a day (Patient not taking: Reported on 3/23/2022 )     • DULoxetine (CYMBALTA) 30 mg delayed release capsule  (Patient not taking: Reported on 1/6/2022 )     • DULoxetine (CYMBALTA) 60 mg delayed release capsule Take 120 mg by mouth daily   (Patient not taking: Reported on 3/14/2022 )     • gabapentin (NEURONTIN) 800 mg tablet Take 1,600 mg by mouth daily at bedtime  (Patient not taking: Reported on 3/14/2022 )  0   • oxyCODONE-acetaminophen (PERCOCET) 5-325 mg per tablet Take 1 tablet by mouth every 6 (six) hours as needed   (Patient not taking: Reported on 3/23/2022 )     • polyethylene glycol (COLYTE) 4000 mL solution Take 240 g by mouth once for 1 dose 4000 mL 0   • Rexulti 3 MG tablet Take 3 mg by mouth daily (Patient not taking: Reported on 10/19/2021)     • traZODone (DESYREL) 100 mg tablet Take 100 mg by mouth daily at bedtime (Patient not taking: Reported on 3/14/2022 )     • Trintellix 5 MG tablet take 1 tablet by mouth once daily IN ADDITION TO 10 MG ONCE DAILY DOSING (Patient not taking: Reported on 11/15/2022)       Current Facility-Administered Medications   Medication Dose Route Frequency Provider Last Rate Last Admin   • cyanocobalamin injection 1,000 mcg  1,000 mcg Intramuscular Q30 Days Franci Vergara PA-C   1,000 mcg at 04/15/21 1415   • cyanocobalamin injection 1,000 mcg  1,000 mcg Intramuscular Q30 Days Franci Vergara PA-C   1,000 mcg at 05/18/21 1408   • cyanocobalamin injection 1,000 mcg  1,000 mcg Intramuscular Q30 Days WHITNEY Restrepo   1,000 mcg at 06/18/21 1325   • cyanocobalamin injection 1,000 mcg  1,000 mcg Intramuscular Q30 Days Charbel Westfall, CRNP   1,000 mcg at 08/31/21 1045   • cyanocobalamin injection 1,000 mcg  1,000 mcg Intramuscular Q30 Days Charbel Westfall CRNP   1,000 mcg at 09/24/21 1157   • cyanocobalamin injection 1,000 mcg  1,000 mcg Intramuscular Q30 Days Charbel Westfall CRNP   1,000 mcg at 10/27/21 1118   • cyanocobalamin injection 1,000 mcg  1,000 mcg Intramuscular Q30 Days Raissa Barargan MD   1,000 mcg at 01/06/22 1359   • cyanocobalamin injection 1,000 mcg  1,000 mcg Intramuscular Q30 Days Raissa Barragan MD   1,000 mcg at 02/03/22 1316   • cyanocobalamin injection 1,000 mcg  1,000 mcg Intramuscular Q30 Days Raissa Barragan MD   1,000 mcg at 03/14/22 1323   • cyanocobalamin injection 1,000 mcg  1,000 mcg Intramuscular Q30 Days Charbel Westfall CRNP   1,000 mcg at 04/19/22 1308   • cyanocobalamin injection 1,000 mcg  1,000 mcg Intramuscular Q30 Days Charbel Westfall CRNP   1,000 mcg at 05/18/22 1550   • cyanocobalamin injection 1,000 mcg  1,000 mcg Intramuscular Q30 Days Charbel Westfall CRNP   1,000 mcg at 06/20/22 1303   • cyanocobalamin injection 1,000 mcg  1,000 mcg Intramuscular Q30 Days Raissa Barragan MD   1,000 mcg at 07/22/22 1314   • cyanocobalamin injection 1,000 mcg  1,000 mcg Intramuscular Q30 Days Raissa Barragan MD   1,000 mcg at 08/22/22 1311   • cyanocobalamin injection 1,000 mcg  1,000 mcg Intramuscular Q30 Days Raissa Barragan MD   1,000 mcg at 09/19/22 1324   • cyanocobalamin injection 1,000 mcg  1,000 mcg Intramuscular Q30 Days Raissa Barragan MD   1,000 mcg at 10/24/22 1325     Allergies   Allergen Reactions   • Prochlorperazine Other (See Comments)     seizure,swollen tongue   • Rofecoxib Other (See Comments)     internal bleeding   • Erythromycin Base Other (See Comments)     seizure      Immunizations:     Immunization History   Administered Date(s) Administered   • COVID-19 PFIZER VACCINE 0 3 ML IM 03/29/2021, 04/19/2021, 11/22/2021   • INFLUENZA 09/20/2016, 09/20/2016, 11/16/2017, 11/16/2017   • Influenza Split 09/17/2013   • Influenza, injectable, quadrivalent, preservative free 0 5 mL 09/20/2018   • Influenza, recombinant, quadrivalent,injectable, preservative free 11/21/2019, 10/20/2020, 01/06/2022, 11/15/2022   • Influenza, seasonal, injectable 07/02/2012   • Tdap 10/22/2003, 06/25/2017      Health Maintenance:         Topic Date Due   • HIV Screening  Never done   • Cervical Cancer Screening  Never done   • Breast Cancer Screening: Mammogram  10/22/2022   • Colorectal Cancer Screening  12/21/2023   • Hepatitis C Screening  Completed         Topic Date Due   • COVID-19 Vaccine (4 - Booster for Pfizer series) 03/22/2022      Medicare Screening Tests and Risk Assessments:     Isa Mccullough is here for her Subsequent Wellness visit  Health Risk Assessment:   Patient rates overall health as fair  Patient feels that their physical health rating is same  Patient is satisfied with their life  Eyesight was rated as slightly worse  Hearing was rated as slightly worse  Patient feels that their emotional and mental health rating is same  Patients states they are never, rarely angry  Patient states they are always unusually tired/fatigued  Pain experienced in the last 7 days has been a lot  Patient's pain rating has been 7/10  Fall Risk Screening: In the past year, patient has experienced: history of falling in past year    Number of falls: 2 or more  Injured during fall?: No    Feels unsteady when standing or walking?: Yes    Worried about falling?: Yes      Urinary Incontinence Screening:   Patient has not leaked urine accidently in the last six months  Home Safety:  Patient has trouble with stairs inside or outside of their home  Patient has working smoke alarms and has working carbon monoxide detector  Home safety hazards include: none  Nutrition:   Current diet is Regular and Low Carb       Medications:   Patient is currently taking over-the-counter supplements  OTC medications include: see medication list  Patient is able to manage medications  Activities of Daily Living (ADLs)/Instrumental Activities of Daily Living (IADLs):   Walk and transfer into and out of bed and chair?: Yes  Dress and groom yourself?: Yes    Bathe or shower yourself?: Yes    Feed yourself? Yes  Do your laundry/housekeeping?: No  Manage your money, pay your bills and track your expenses?: Yes  Make your own meals?: Yes    Do your own shopping?: No    Previous Hospitalizations:   Any hospitalizations or ED visits within the last 12 months?: No      Advance Care Planning:   Living will: No    Durable POA for healthcare: No    Advanced directive: No      PREVENTIVE SCREENINGS      Cardiovascular Screening:    General: Screening Not Indicated and History Lipid Disorder      Colorectal Cancer Screening:     General: Screening Current      Breast Cancer Screening:     General: Screening Current      Lung Cancer Screening:     General: Screening Not Indicated      Hepatitis C Screening:    General: Screening Current    Screening, Brief Intervention, and Referral to Treatment (SBIRT)    Screening  Typical number of drinks in a day: 0  Typical number of drinks in a week: 0  Interpretation: Low risk drinking behavior  Review of Current Opioid Use    Opioid Risk Tool (ORT) Interpretation: Complete Opioid Risk Tool (ORT)    No exam data present     Physical Exam:     /74 (BP Location: Left arm, Patient Position: Sitting, Cuff Size: Large)   Pulse 93   Temp 97 8 °F (36 6 °C) (Tympanic)   Resp 16   Ht 5' 2 5" (1 588 m)   Wt 86 6 kg (191 lb)   SpO2 98%   BMI 34 38 kg/m²     Physical Exam  Vitals and nursing note reviewed  Constitutional:       General: She is not in acute distress  Appearance: Normal appearance  She is well-developed  She is not ill-appearing  HENT:      Head: Normocephalic and atraumatic        Right Ear: Tympanic membrane, ear canal and external ear normal       Left Ear: Tympanic membrane, ear canal and external ear normal       Nose: No congestion  Eyes:      Extraocular Movements: Extraocular movements intact  Conjunctiva/sclera: Conjunctivae normal       Pupils: Pupils are equal, round, and reactive to light  Cardiovascular:      Rate and Rhythm: Normal rate and regular rhythm  Heart sounds: Normal heart sounds  No murmur heard  Pulmonary:      Effort: Pulmonary effort is normal       Breath sounds: Normal breath sounds  Abdominal:      General: Bowel sounds are normal       Palpations: Abdomen is soft  Musculoskeletal:         General: Normal range of motion  Cervical back: Normal range of motion  Lymphadenopathy:      Cervical: No cervical adenopathy  Skin:     General: Skin is warm and dry  Neurological:      Mental Status: She is alert and oriented to person, place, and time     Psychiatric:         Mood and Affect: Mood normal          Behavior: Behavior normal           WHITNEY Hauser

## 2022-11-16 DIAGNOSIS — R94.4 DECREASED GFR: Primary | ICD-10-CM

## 2022-11-16 LAB
EST. AVERAGE GLUCOSE BLD GHB EST-MCNC: 94 MG/DL
HBA1C MFR BLD: 4.9 %

## 2022-11-17 ENCOUNTER — TELEPHONE (OUTPATIENT)
Dept: FAMILY MEDICINE CLINIC | Facility: CLINIC | Age: 62
End: 2022-11-17

## 2022-11-17 NOTE — TELEPHONE ENCOUNTER
----- Message from [id number] sent at 11/16/2022  1:46 PM EST -----  Labs show decreased kidney function  I recommend increasing oral hydration and repeating CMP in 1 month  I placed order in computer  There rest of her labs are stable

## 2022-11-17 NOTE — TELEPHONE ENCOUNTER
Pt called back and was given results, Stated she drinks about 7 bottles a day, used to drink more but was told to cut back because her sodium was to low

## 2022-11-20 LAB — VIT B1 BLD-SCNC: 111.9 NMOL/L (ref 66.5–200)

## 2022-11-28 ENCOUNTER — CLINICAL SUPPORT (OUTPATIENT)
Dept: FAMILY MEDICINE CLINIC | Facility: CLINIC | Age: 62
End: 2022-11-28

## 2022-11-28 DIAGNOSIS — E53.8 VITAMIN B 12 DEFICIENCY: Primary | ICD-10-CM

## 2022-11-28 RX ORDER — CYANOCOBALAMIN 1000 UG/ML
1000 INJECTION, SOLUTION INTRAMUSCULAR; SUBCUTANEOUS
Status: SHIPPED | OUTPATIENT
Start: 2022-11-28

## 2022-11-28 RX ADMIN — CYANOCOBALAMIN 1000 MCG: 1000 INJECTION, SOLUTION INTRAMUSCULAR; SUBCUTANEOUS at 14:42

## 2022-11-28 NOTE — PROGRESS NOTES
Patient was in office for nurse visit for Vitamin B12 injection   Pt received injection in left deltoid

## 2022-12-13 DIAGNOSIS — J30.1 SEASONAL ALLERGIC RHINITIS DUE TO POLLEN: ICD-10-CM

## 2022-12-13 RX ORDER — LEVOCETIRIZINE DIHYDROCHLORIDE 5 MG/1
TABLET, FILM COATED ORAL
Qty: 90 TABLET | Refills: 1 | Status: SHIPPED | OUTPATIENT
Start: 2022-12-13

## 2022-12-21 DIAGNOSIS — K21.9 GERD WITHOUT ESOPHAGITIS: ICD-10-CM

## 2022-12-21 RX ORDER — OMEPRAZOLE 40 MG/1
40 CAPSULE, DELAYED RELEASE ORAL DAILY
Qty: 90 CAPSULE | Refills: 1 | Status: SHIPPED | OUTPATIENT
Start: 2022-12-21

## 2022-12-28 ENCOUNTER — CLINICAL SUPPORT (OUTPATIENT)
Dept: FAMILY MEDICINE CLINIC | Facility: CLINIC | Age: 62
End: 2022-12-28

## 2022-12-28 DIAGNOSIS — E53.8 VITAMIN B 12 DEFICIENCY: Primary | ICD-10-CM

## 2022-12-28 RX ORDER — CYANOCOBALAMIN 1000 UG/ML
1000 INJECTION, SOLUTION INTRAMUSCULAR; SUBCUTANEOUS
Status: SHIPPED | OUTPATIENT
Start: 2022-12-28

## 2022-12-28 RX ADMIN — CYANOCOBALAMIN 1000 MCG: 1000 INJECTION, SOLUTION INTRAMUSCULAR; SUBCUTANEOUS at 14:04

## 2023-01-14 PROBLEM — Z00.00 HEALTHCARE MAINTENANCE: Status: RESOLVED | Noted: 2022-11-15 | Resolved: 2023-01-14

## 2023-01-30 ENCOUNTER — CLINICAL SUPPORT (OUTPATIENT)
Dept: FAMILY MEDICINE CLINIC | Facility: CLINIC | Age: 63
End: 2023-01-30

## 2023-01-30 DIAGNOSIS — E53.8 VITAMIN B 12 DEFICIENCY: Primary | ICD-10-CM

## 2023-01-30 RX ORDER — CYANOCOBALAMIN 1000 UG/ML
1000 INJECTION, SOLUTION INTRAMUSCULAR; SUBCUTANEOUS
Status: SHIPPED | OUTPATIENT
Start: 2023-01-30

## 2023-01-30 RX ADMIN — CYANOCOBALAMIN 1000 MCG: 1000 INJECTION, SOLUTION INTRAMUSCULAR; SUBCUTANEOUS at 14:53

## 2023-03-03 ENCOUNTER — CLINICAL SUPPORT (OUTPATIENT)
Dept: FAMILY MEDICINE CLINIC | Facility: CLINIC | Age: 63
End: 2023-03-03

## 2023-03-03 DIAGNOSIS — E53.8 VITAMIN B 12 DEFICIENCY: Primary | ICD-10-CM

## 2023-03-03 RX ORDER — CYANOCOBALAMIN 1000 UG/ML
1000 INJECTION, SOLUTION INTRAMUSCULAR; SUBCUTANEOUS
Status: SHIPPED | OUTPATIENT
Start: 2023-03-03

## 2023-03-03 RX ADMIN — CYANOCOBALAMIN 1000 MCG: 1000 INJECTION, SOLUTION INTRAMUSCULAR; SUBCUTANEOUS at 13:33

## 2023-03-22 LAB — HBA1C MFR BLD HPLC: 5.4 %

## 2023-05-10 ENCOUNTER — RA CDI HCC (OUTPATIENT)
Dept: OTHER | Facility: HOSPITAL | Age: 63
End: 2023-05-10

## 2023-05-17 ENCOUNTER — OFFICE VISIT (OUTPATIENT)
Dept: FAMILY MEDICINE CLINIC | Facility: CLINIC | Age: 63
End: 2023-05-17

## 2023-05-17 VITALS
DIASTOLIC BLOOD PRESSURE: 76 MMHG | HEART RATE: 73 BPM | TEMPERATURE: 97.5 F | BODY MASS INDEX: 28 KG/M2 | RESPIRATION RATE: 16 BRPM | SYSTOLIC BLOOD PRESSURE: 120 MMHG | HEIGHT: 63 IN | WEIGHT: 158 LBS

## 2023-05-17 DIAGNOSIS — G47.00 INSOMNIA, UNSPECIFIED TYPE: ICD-10-CM

## 2023-05-17 DIAGNOSIS — R19.8 ALTERNATING CONSTIPATION AND DIARRHEA: ICD-10-CM

## 2023-05-17 DIAGNOSIS — E55.9 VITAMIN D INSUFFICIENCY: ICD-10-CM

## 2023-05-17 DIAGNOSIS — Z12.31 ENCOUNTER FOR SCREENING MAMMOGRAM FOR MALIGNANT NEOPLASM OF BREAST: ICD-10-CM

## 2023-05-17 DIAGNOSIS — R11.0 NAUSEA: ICD-10-CM

## 2023-05-17 DIAGNOSIS — E78.2 MIXED HYPERLIPIDEMIA: ICD-10-CM

## 2023-05-17 DIAGNOSIS — K52.9 GASTROENTERITIS: Primary | ICD-10-CM

## 2023-05-17 DIAGNOSIS — D64.9 ANEMIA, UNSPECIFIED TYPE: ICD-10-CM

## 2023-05-17 DIAGNOSIS — F31.81 BIPOLAR 2 DISORDER (HCC): ICD-10-CM

## 2023-05-17 DIAGNOSIS — E53.8 VITAMIN B 12 DEFICIENCY: ICD-10-CM

## 2023-05-17 DIAGNOSIS — K21.9 GERD WITHOUT ESOPHAGITIS: ICD-10-CM

## 2023-05-17 RX ORDER — ESOMEPRAZOLE MAGNESIUM 40 MG/1
40 CAPSULE, DELAYED RELEASE ORAL DAILY
Qty: 90 CAPSULE | Refills: 1 | Status: SHIPPED | OUTPATIENT
Start: 2023-05-17 | End: 2023-08-15

## 2023-05-17 RX ORDER — CYANOCOBALAMIN 1000 UG/ML
1000 INJECTION, SOLUTION INTRAMUSCULAR; SUBCUTANEOUS
Status: SHIPPED | OUTPATIENT
Start: 2023-05-17

## 2023-05-17 RX ORDER — ONDANSETRON 4 MG/1
TABLET, FILM COATED ORAL
COMMUNITY
Start: 2023-05-09 | End: 2023-05-17 | Stop reason: SDUPTHER

## 2023-05-17 RX ORDER — ONDANSETRON 4 MG/1
4 TABLET, FILM COATED ORAL EVERY 8 HOURS PRN
Qty: 20 TABLET | Refills: 0 | Status: SHIPPED | OUTPATIENT
Start: 2023-05-17

## 2023-05-17 RX ADMIN — CYANOCOBALAMIN 1000 MCG: 1000 INJECTION, SOLUTION INTRAMUSCULAR; SUBCUTANEOUS at 13:21

## 2023-05-17 NOTE — PATIENT INSTRUCTIONS
CBC IRON PANEL IN 2-4 WEEKS  ROUTINE BLOOD WORK AND FOLLOW UP IN 6 MONTHS  START NEXIUM  IF NOT IMPROVED CAN TAKE PEPCID 20 MG AT NIGHT  FOLLOW UP WITH GASTROENTEROLOGIST     GET MAMMOGRAM    CAN TAKE OVER THE COUNTER VITAMIN D SUPPLEMENT 2000 UNITS DAILY

## 2023-05-17 NOTE — ASSESSMENT & PLAN NOTE
- Not well controlled  - Will switch to Nexium 40 mg daily  If no improvement can take Pepcid 20 mg at bedtime    - Avoid triggering food and beverage   - She was referred to GI for possible EGD  - Will continue to follow up

## 2023-05-17 NOTE — ASSESSMENT & PLAN NOTE
- Well controlled on trazodone 100 mg daily at bedtime  Continue same    - Will continue to monitor

## 2023-05-17 NOTE — ASSESSMENT & PLAN NOTE
- Admitted 5/7 - 5/9  Treated with IV fluids, Zofran, and electrolytes  - Symptoms improving and she is tolerating an oral diet

## 2023-05-17 NOTE — PROGRESS NOTES
Name: Grace Becerra      : 1960      MRN: [id number]  Encounter Provider: WHITNEY Medrano  Encounter Date: 2023   Encounter department: [address] [place] [address]  Gastroenteritis  Assessment & Plan:  - Admitted  -   Treated with IV fluids, Zofran, and electrolytes  - Symptoms improving and she is tolerating an oral diet  2  GERD without esophagitis  Assessment & Plan:  - Not well controlled  - Will switch to Nexium 40 mg daily  If no improvement can take Pepcid 20 mg at bedtime    - Avoid triggering food and beverage   - She was referred to GI for possible EGD  - Will continue to follow up  Orders:  -     esomeprazole (NexIUM) 40 MG capsule; Take 1 capsule (40 mg total) by mouth in the morning for 90 doses  -     Ambulatory Referral to Gastroenterology; Future    3  Alternating constipation and diarrhea  Assessment & Plan:  - Recommend increased oral hydration   - Can take Metamucil or Benefiber    - Follow up with GI        4  Vitamin B 12 deficiency  Assessment & Plan:  - Received Vitamin B12 injection in office today  - Will continue to monitor  Orders:  -     cyanocobalamin injection 1,000 mcg  -     Vitamin B12; Future    5  Bipolar 2 disorder ([facility] [address] )  Assessment & Plan:  - Stable on current medication regimen   - Continue routine follow up with Psychiatrist        6  Anemia, unspecified type  Assessment & Plan:  - hx of iron infusions    - Hgb 10 0 while inpatient  - Will obtain updated CBC and iron panel  Orders:  -     CBC and differential; Future; Expected date: 2023  -     Iron Panel (Includes Ferritin, Iron Sat%, Iron, and TIBC); Future; Expected date: 2023    7  Mixed hyperlipidemia  Assessment & Plan:  - Well controlled  - Encourage healthy diet and regular exercise  - Will continue to monitor  Orders:  -     CBC and differential; Future  -     Comprehensive metabolic panel;  Future  - Lipid Panel with Direct LDL reflex; Future  -     TSH, 3rd generation with Free T4 reflex; Future    8  Vitamin D insufficiency  Assessment & Plan:  - Take over the counter vitamin D supplement 2,000 units daily  - Will continue to monitor  Orders:  -     Vitamin D 25 hydroxy; Future    9  Insomnia, unspecified type  Assessment & Plan:  - Well controlled on trazodone 100 mg daily at bedtime  Continue same    - Will continue to monitor  10  Nausea  -     ondansetron (ZOFRAN) 4 mg tablet; Take 1 tablet (4 mg total) by mouth every 8 (eight) hours as needed for nausea or vomiting    11  Encounter for screening mammogram for malignant neoplasm of breast  -     Mammo screening bilateral w 3d & cad; Future; Expected date: 05/17/2023           Subjective     Patient with PMH of GERD, allergies, insomnia, iron deficiency anemia, B12 deficiency, hyperlipidemia, and bipolar disorder presents today for hospital follow up  She was admitted 5/7/2023 - 5/9/2023 with viral gastroenteritis  Treated with IV fluids and electrolytes  Labs stabilized and she was tolerating diet  She was discharged home in stable condition  Still reports some nausea relief with use of zofran as needed  Complains of alternating constipation and diarrhea  Has been able to eat and drink  States she is drinking a lot of water  She also has complaints of increased acid reflux symptoms  States she burps just drinking water  Also gets a weird taste in her mouth which makes her nauseous  Has been on omeprazole for quite some time and feels it is no longer working  She would also like to be tested for Celiac disease  Denies any other concerns or complaints today  Review of Systems   Constitutional: Negative for fatigue and fever  HENT: Negative for trouble swallowing  Eyes: Negative for visual disturbance  Respiratory: Negative for cough and shortness of breath  Cardiovascular: Negative for chest pain and palpitations  Gastrointestinal: Positive for constipation, diarrhea and nausea  Negative for abdominal pain and blood in stool  GERD   Endocrine: Negative for cold intolerance and heat intolerance  Genitourinary: Negative for difficulty urinating and dysuria  Musculoskeletal: Negative for gait problem  Skin: Negative for rash  Neurological: Negative for dizziness, syncope and headaches  Hematological: Negative for adenopathy  Psychiatric/Behavioral: Negative for behavioral problems         Past Medical History:   Diagnosis Date   • Bipolar affective disorder, depressed ([facility] [address] )    • Colon polyp    • GERD (gastroesophageal reflux disease)    • Heart murmur    • Osteoarthritis    • Psychiatric disorder    • Subdural hematoma (HCC)    • Vitamin B12 deficiency      Past Surgical History:   Procedure Laterality Date   • BREAST LUMPECTOMY Right     benign   • CERVICAL SPINE SURGERY  2002   • CHOLECYSTECTOMY  05/1985   • FOOT FRACTURE SURGERY Right 2012    outcome - good   • FOOT SURGERY Left    • GASTRIC BYPASS  2004    x2- 2nd bypass 10/12   • HYSTERECTOMY      age 54   • KNEE SURGERY Right 01/30/2020     Family History   Problem Relation Age of Onset   • Diabetes Father    • Heart disease Father    • Coronary artery disease Family    • Heart attack Family    • No Known Problems Mother    • Breast cancer Maternal Grandmother    • Breast cancer Maternal Grandfather    • Breast cancer Paternal Grandmother    • Breast cancer Paternal Grandfather    • Breast cancer Half-Sister 77   • Breast cancer Paternal [de-identified]    • Breast cancer Paternal Aunt    • Colon cancer Maternal Uncle    • No Known Problems Son      Social History     Socioeconomic History   • Marital status:      Spouse name: None   • Number of children: None   • Years of education: None   • Highest education level: None   Occupational History   • None   Tobacco Use   • Smoking status: Never   • Smokeless tobacco: Never   • Tobacco comments:     passive smoke exposure   Vaping Use   • Vaping Use: Never used   Substance and Sexual Activity   • Alcohol use:  Yes   • Drug use: No   • Sexual activity: None   Other Topics Concern   • None   Social History Narrative   • None     Social Determinants of Health     Financial Resource Strain: Not on file   Food Insecurity: Not on file   Transportation Needs: Not on file   Physical Activity: Not on file   Stress: Not on file   Social Connections: Not on file   Intimate Partner Violence: Not on file   Housing Stability: Not on file     Current Outpatient Medications on File Prior to Visit   Medication Sig   • acetaminophen (TYLENOL) 650 mg CR tablet 1 as directed for pain   • hydrOXYzine HCL (ATARAX) 50 mg tablet Take 50 mg by mouth daily at bedtime     • levocetirizine (XYZAL) 5 MG tablet take 1 tablet by mouth every evening   • nystatin (MYCOSTATIN) cream Apply topically 2 (two) times a day   • omeprazole (PriLOSEC) 40 MG capsule Take 1 capsule (40 mg total) by mouth daily   • phentermine 37 5 MG capsule Take 37 5 mg by mouth every morning   • QUEtiapine (SEROquel) 400 MG tablet Take 800 mg by mouth daily at bedtime   • tiZANidine (ZANAFLEX) 4 mg tablet Take 4 mg by mouth 3 (three) times a day   • traMADol (ULTRAM) 50 mg tablet Take 1 tablet daily as needed   • traZODone (DESYREL) 100 mg tablet Take 100 mg by mouth daily at bedtime   • [DISCONTINUED] ondansetron (ZOFRAN) 4 mg tablet take 1 to 2 tablets by mouth every 8 hours if needed for nausea and vomiting   • ARIPiprazole (ABILIFY) 10 mg tablet    • buprenorphine (BUTRANS) 15 mcg/hr Place 1 patch on the skin once a week (Patient not taking: Reported on 5/17/2023)   • buPROPion (WELLBUTRIN) 100 mg tablet Take 200 mg by mouth 2 (two) times a day   • busPIRone (BUSPAR) 15 mg tablet Take 15 mg by mouth 2 (two) times a day   • clotrimazole-betamethasone (LOTRISONE) 1-0 05 % cream Apply topically 2 (two) times a day   • DULoxetine (CYMBALTA) 30 mg delayed release capsule    • "DULoxetine (CYMBALTA) 60 mg delayed release capsule Take 120 mg by mouth daily   • gabapentin (NEURONTIN) 600 MG tablet Take 1,200 mg by mouth daily at bedtime (Patient not taking: Reported on 5/17/2023)   • gabapentin (NEURONTIN) 800 mg tablet Take 1,600 mg by mouth daily at bedtime  (Patient not taking: Reported on 3/14/2022)   • oxyCODONE-acetaminophen (PERCOCET) 5-325 mg per tablet Take 1 tablet by mouth every 6 (six) hours as needed   (Patient not taking: Reported on 3/23/2022)   • polyethylene glycol (COLYTE) 4000 mL solution Take 240 g by mouth once for 1 dose   • Rexulti 3 MG tablet Take 3 mg by mouth daily   • Trintellix 10 MG tablet take 1 tablet by mouth once daily IN ADDITION TO 5 MG DAILY DOSING (Patient not taking: Reported on 5/17/2023)   • Trintellix 5 MG tablet take 1 tablet by mouth once daily IN ADDITION TO 10 MG ONCE DAILY DOSING (Patient not taking: Reported on 11/15/2022)     Allergies   Allergen Reactions   • Prochlorperazine Other (See Comments)     seizure,swollen tongue   • Rofecoxib Other (See Comments)     internal bleeding   • Erythromycin Base Other (See Comments)     seizure     Immunization History   Administered Date(s) Administered   • COVID-19 PFIZER VACCINE 0 3 ML IM 03/29/2021, 04/19/2021, 11/22/2021   • INFLUENZA 09/20/2016, 09/20/2016, 11/16/2017, 11/16/2017, 11/15/2022   • Influenza Split 09/17/2013   • Influenza, injectable, quadrivalent, preservative free 0 5 mL 09/20/2018   • Influenza, recombinant, quadrivalent,injectable, preservative free 11/21/2019, 10/20/2020, 01/06/2022, 11/15/2022   • Influenza, seasonal, injectable 07/02/2012   • Tdap 10/22/2003, 06/25/2017       Objective     /76 (BP Location: Left arm, Patient Position: Sitting, Cuff Size: Large)   Pulse 73   Temp 97 5 °F (36 4 °C) (Tympanic)   Resp 16   Ht 5' 2 5\" (1 588 m)   Wt 71 7 kg (158 lb)   BMI 28 44 kg/m²     Physical Exam  Vitals and nursing note reviewed     Constitutional:       General: She " is not in acute distress  Appearance: Normal appearance  She is not ill-appearing  HENT:      Head: Normocephalic and atraumatic  Right Ear: External ear normal       Left Ear: External ear normal    Eyes:      Conjunctiva/sclera: Conjunctivae normal    Cardiovascular:      Rate and Rhythm: Normal rate and regular rhythm  Heart sounds: Normal heart sounds  Pulmonary:      Effort: Pulmonary effort is normal       Breath sounds: Normal breath sounds  Musculoskeletal:         General: Normal range of motion  Cervical back: Normal range of motion  Skin:     General: Skin is warm and dry  Neurological:      Mental Status: She is alert and oriented to person, place, and time  Cranial Nerves: No cranial nerve deficit     Psychiatric:         Mood and Affect: Mood normal          Behavior: Behavior normal        WHITNEY Sow

## 2023-05-30 ENCOUNTER — OFFICE VISIT (OUTPATIENT)
Dept: FAMILY MEDICINE CLINIC | Facility: CLINIC | Age: 63
End: 2023-05-30

## 2023-05-30 VITALS
HEIGHT: 63 IN | WEIGHT: 154 LBS | BODY MASS INDEX: 27.29 KG/M2 | SYSTOLIC BLOOD PRESSURE: 108 MMHG | TEMPERATURE: 97.3 F | HEART RATE: 88 BPM | DIASTOLIC BLOOD PRESSURE: 76 MMHG | RESPIRATION RATE: 16 BRPM | OXYGEN SATURATION: 98 %

## 2023-05-30 DIAGNOSIS — R19.8 ALTERNATING CONSTIPATION AND DIARRHEA: ICD-10-CM

## 2023-05-30 DIAGNOSIS — F31.81 BIPOLAR 2 DISORDER (HCC): ICD-10-CM

## 2023-05-30 DIAGNOSIS — J30.1 SEASONAL ALLERGIC RHINITIS DUE TO POLLEN: ICD-10-CM

## 2023-05-30 DIAGNOSIS — K21.9 GERD WITHOUT ESOPHAGITIS: Primary | ICD-10-CM

## 2023-05-30 RX ORDER — LORAZEPAM 0.5 MG/1
0.5 TABLET ORAL DAILY PRN
COMMUNITY
Start: 2023-05-18

## 2023-05-30 RX ORDER — CYANOCOBALAMIN 1000 UG/ML
1000 INJECTION, SOLUTION INTRAMUSCULAR; SUBCUTANEOUS
Status: DISCONTINUED | OUTPATIENT
Start: 2023-05-30 | End: 2023-05-30

## 2023-05-30 NOTE — PROGRESS NOTES
Name: Robert Andrew      : 1960      MRN: [id number]  Encounter Provider: WHITNEY Levy  Encounter Date: 2023   Encounter department: [address] [place] [address]  GERD without esophagitis  Assessment & Plan:  - Continue Nexium 40 mg in the morning before breakfast  Can take Pepcid 20 mg at bedtime    - Avoid triggering food and beverage   - Follow up with GI for possible EGD as previously recommended  2  Alternating constipation and diarrhea  Assessment & Plan:  - Increase oral hydration   - Take Metamucil    - Follow up with GI as previously recommended  3  Bipolar 2 disorder (HCC)  Assessment & Plan:  - Stable on current medication regimen  Continue same    - Continue routine follow up with Psychiatrist        4  Seasonal allergic rhinitis due to pollen  Assessment & Plan:  - Well controlled with use of Xyzal  Continue same    - Will continue to monitor  Subjective     Patient presents today with complaints of continued GERD symptoms as well as constipation and diarrhea  Also complains of small lump in her abdomen  States nexium is not helping her GERD symptoms  She has not started taking pepcid at night or metamucil as previously recommended  She plans on scheduling an appointment with GI  Review of Systems   Constitutional: Negative for fatigue and fever  HENT: Negative for trouble swallowing  Eyes: Negative for visual disturbance  Respiratory: Negative for cough and shortness of breath  Cardiovascular: Negative for chest pain and palpitations  Gastrointestinal: Positive for constipation and diarrhea  Negative for abdominal pain and blood in stool  GERD   Endocrine: Negative for cold intolerance and heat intolerance  Genitourinary: Negative for difficulty urinating and dysuria  Musculoskeletal: Negative for gait problem  Skin: Negative for rash     Neurological: Negative for dizziness, syncope and headaches  Hematological: Negative for adenopathy  Psychiatric/Behavioral: Negative for behavioral problems  Past Medical History:   Diagnosis Date   • Bipolar affective disorder, depressed ([address] )    • Colon polyp    • GERD (gastroesophageal reflux disease)    • Heart murmur    • Osteoarthritis    • Psychiatric disorder    • Subdural hematoma (HCC)    • Vitamin B12 deficiency      Past Surgical History:   Procedure Laterality Date   • BREAST LUMPECTOMY Right     benign   • CERVICAL SPINE SURGERY  2002   • CHOLECYSTECTOMY  05/1985   • FOOT FRACTURE SURGERY Right 2012    outcome - good   • FOOT SURGERY Left    • GASTRIC BYPASS  2004    x2- 2nd bypass 10/12   • HYSTERECTOMY      age 54   • KNEE SURGERY Right 01/30/2020     Family History   Problem Relation Age of Onset   • Diabetes Father    • Heart disease Father    • Coronary artery disease Family    • Heart attack Family    • No Known Problems Mother    • Breast cancer Maternal Grandmother    • Breast cancer Maternal Grandfather    • Breast cancer Paternal Grandmother    • Breast cancer Paternal Grandfather    • Breast cancer Half-Sister 77   • Breast cancer Paternal [de-identified]    • Breast cancer Paternal Aunt    • Colon cancer Maternal Uncle    • No Known Problems Son      Social History     Socioeconomic History   • Marital status:      Spouse name: None   • Number of children: None   • Years of education: None   • Highest education level: None   Occupational History   • None   Tobacco Use   • Smoking status: Never   • Smokeless tobacco: Never   • Tobacco comments:     passive smoke exposure   Vaping Use   • Vaping Use: Never used   Substance and Sexual Activity   • Alcohol use:  Yes   • Drug use: No   • Sexual activity: None   Other Topics Concern   • None   Social History Narrative   • None     Social Determinants of Health     Financial Resource Strain: Not on file   Food Insecurity: Not on file   Transportation Needs: Not on file Physical Activity: Not on file   Stress: Not on file   Social Connections: Not on file   Intimate Partner Violence: Not on file   Housing Stability: Not on file     Current Outpatient Medications on File Prior to Visit   Medication Sig   • acetaminophen (TYLENOL) 650 mg CR tablet 1 as directed for pain   • ARIPiprazole (ABILIFY) 10 mg tablet    • buPROPion (WELLBUTRIN) 100 mg tablet Take 200 mg by mouth 2 (two) times a day   • busPIRone (BUSPAR) 15 mg tablet Take 15 mg by mouth 2 (two) times a day   • clotrimazole-betamethasone (LOTRISONE) 1-0 05 % cream Apply topically 2 (two) times a day   • hydrOXYzine HCL (ATARAX) 50 mg tablet Take 50 mg by mouth daily at bedtime     • levocetirizine (XYZAL) 5 MG tablet take 1 tablet by mouth every evening   • LORazepam (ATIVAN) 0 5 mg tablet Take 0 5 mg by mouth daily as needed   • nystatin (MYCOSTATIN) cream Apply topically 2 (two) times a day   • omeprazole (PriLOSEC) 40 MG capsule Take 1 capsule (40 mg total) by mouth daily   • ondansetron (ZOFRAN) 4 mg tablet Take 1 tablet (4 mg total) by mouth every 8 (eight) hours as needed for nausea or vomiting   • QUEtiapine (SEROquel) 400 MG tablet Take 800 mg by mouth daily at bedtime   • Rexulti 3 MG tablet Take 3 mg by mouth daily   • tiZANidine (ZANAFLEX) 4 mg tablet Take 4 mg by mouth 3 (three) times a day   • traMADol (ULTRAM) 50 mg tablet Take 1 tablet daily as needed   • traZODone (DESYREL) 100 mg tablet Take 100 mg by mouth daily at bedtime   • buprenorphine (BUTRANS) 15 mcg/hr Place 1 patch on the skin once a week (Patient not taking: Reported on 5/17/2023)   • DULoxetine (CYMBALTA) 30 mg delayed release capsule    • DULoxetine (CYMBALTA) 60 mg delayed release capsule Take 120 mg by mouth daily   • esomeprazole (NexIUM) 40 MG capsule Take 1 capsule (40 mg total) by mouth in the morning for 90 doses   • gabapentin (NEURONTIN) 600 MG tablet Take 1,200 mg by mouth daily at bedtime (Patient not taking: Reported on 5/17/2023) "  • gabapentin (NEURONTIN) 800 mg tablet Take 1,600 mg by mouth daily at bedtime  (Patient not taking: Reported on 3/14/2022)   • oxyCODONE-acetaminophen (PERCOCET) 5-325 mg per tablet Take 1 tablet by mouth every 6 (six) hours as needed   (Patient not taking: Reported on 3/23/2022)   • phentermine 37 5 MG capsule Take 37 5 mg by mouth every morning (Patient not taking: Reported on 5/30/2023)   • polyethylene glycol (COLYTE) 4000 mL solution Take 240 g by mouth once for 1 dose   • Trintellix 10 MG tablet take 1 tablet by mouth once daily IN ADDITION TO 5 MG DAILY DOSING (Patient not taking: Reported on 5/17/2023)   • Trintellix 5 MG tablet take 1 tablet by mouth once daily IN ADDITION TO 10 MG ONCE DAILY DOSING (Patient not taking: Reported on 11/15/2022)     Allergies   Allergen Reactions   • Prochlorperazine Other (See Comments)     seizure,swollen tongue   • Rofecoxib Other (See Comments)     internal bleeding   • Erythromycin Base Other (See Comments)     seizure     Immunization History   Administered Date(s) Administered   • COVID-19 PFIZER VACCINE 0 3 ML IM 03/29/2021, 04/19/2021, 11/22/2021   • INFLUENZA 09/20/2016, 09/20/2016, 11/16/2017, 11/16/2017, 11/15/2022   • Influenza Split 09/17/2013   • Influenza, injectable, quadrivalent, preservative free 0 5 mL 09/20/2018   • Influenza, recombinant, quadrivalent,injectable, preservative free 11/21/2019, 10/20/2020, 01/06/2022, 11/15/2022   • Influenza, seasonal, injectable 07/02/2012   • Tdap 10/22/2003, 06/25/2017       Objective     /76 (BP Location: Left arm, Patient Position: Sitting, Cuff Size: Large)   Pulse 88   Temp (!) 97 3 °F (36 3 °C) (Tympanic)   Resp 16   Ht 5' 2 5\" (1 588 m)   Wt 69 9 kg (154 lb)   SpO2 98%   BMI 27 72 kg/m²     Physical Exam  Vitals and nursing note reviewed  Constitutional:       Appearance: Normal appearance  HENT:      Head: Normocephalic and atraumatic        Right Ear: External ear normal       Left Ear: " External ear normal    Eyes:      Conjunctiva/sclera: Conjunctivae normal    Cardiovascular:      Rate and Rhythm: Normal rate and regular rhythm  Heart sounds: Normal heart sounds  Pulmonary:      Effort: Pulmonary effort is normal       Breath sounds: Normal breath sounds  Abdominal:      General: Bowel sounds are normal       Palpations: Abdomen is soft  There is no mass  Tenderness: There is no abdominal tenderness  Musculoskeletal:         General: Normal range of motion  Cervical back: Normal range of motion  Skin:     General: Skin is warm and dry  Neurological:      Mental Status: She is alert and oriented to person, place, and time  Cranial Nerves: No cranial nerve deficit     Psychiatric:         Mood and Affect: Mood normal          Behavior: Behavior normal        WHITNEY Queen

## 2023-05-30 NOTE — ASSESSMENT & PLAN NOTE
- Stable on current medication regimen   Continue same    - Continue routine follow up with Psychiatrist

## 2023-05-30 NOTE — ASSESSMENT & PLAN NOTE
- Continue Nexium 40 mg in the morning before breakfast  Can take Pepcid 20 mg at bedtime    - Avoid triggering food and beverage   - Follow up with GI for possible EGD as previously recommended

## 2023-06-12 NOTE — PROGRESS NOTES
Assessment/Plan:    Lab results have been reviewed    -refer to Sleep Medicine for fatigue/snoring  I also advised her to avoid drinking beverages at least 2-3 hours prior to bedtime but she states that she needs to drink because she needs to take her nighttime medicines  -patient will drop off her fit test but she also agreed to have the Cologuard which would be good for 3 years  -continue follow-up with Psychiatry as advised  -patient will get her flu vaccine in October or November  She did get her vitamin B12 vaccine today  -I did renew her tramadol which she states she only takes once or twice a week  She was previously getting it from a different specialist   The PDMP was reviewed  Last prescription was in May   -recommend follow-up in 3 months      BMI Counseling: Body mass index is 43 47 kg/m²  The BMI is above normal  Nutrition recommendations include reducing portion sizes, decreasing overall calorie intake and decreasing soda and/or juice intake   patient has gained weight    Prescriptions    *Highlighted drugs could not be included in score calculations   Prescriptions  Total Prescriptions: 17    Total Private Pay: 0    Fill Date ID   Written Drug Qty Days Prescriber Rx # Pharmacy Refill   Daily Dose* Pymt Type      05/07/2020  1   04/10/2020  Tramadol Hcl 50 MG Tablet  30 00  10 Ke Dep   [id number]   Trey ([id number])   0  15 00 MME  [place]   02/29/2020  1   01/14/2020  Buprenorphine 15 Mcg/Hr Patch  4 00  28 Ke Zah   [id number]   Trey ([id number])   0  0 36 mg  [place]   02/15/2020  1   02/14/2020  Tramadol Hcl 50 MG Tablet  30 00  10 Ra Marc   [id number]   Rit ([id number])   0  15 00 MME  [place]   02/02/2020  1   02/01/2020  Tramadol Hcl 50 MG Tablet  30 00  5 Ma Bra   [id number]   Rit ([id number])   0  30 00 MME  [place]   02/02/2020  1   02/01/2020  Oxycodone-Acetaminophen 5-325  30 00  7 Ma Bra   [id number]   Rit ([id number])   0  32 14 MME  [place]   01/21/2020  1   01/14/2020  Buprenorphine 15 Mcg/Hr Patch 4 [facility]   [id number]   Trey ([id number])   0  0 36 mg  [place]        Diagnoses and all orders for this visit:    GERD without esophagitis    Chronic fatigue  -     Ambulatory referral to Sleep Medicine; Future    Morbid obesity with BMI of 40 0-44 9, adult [facility])  -     Ambulatory referral to Sleep Medicine; Future    Bipolar disorder, current episode mixed, mild (HCC)    Snoring  -     Ambulatory referral to Sleep Medicine; Future    Localized osteoarthritis of left knee    Chronic pain of left knee  -     traMADol (ULTRAM) 50 mg tablet; Take 1 tablet daily as needed    Postgastrectomy malabsorption  -     cyanocobalamin injection 1,000 mcg    Chronic bilateral low back pain with sciatica, sciatica laterality unspecified  -     traMADol (ULTRAM) 50 mg tablet; Take 1 tablet daily as needed          Subjective:      Patient ID: Arcadio Wheeler is a 60 y o  female  Patient presents today for routine follow-up  She has been noticing more fatigue  She states that she does wake up in the middle the night to go to the bathroom at least 5 times  She does snore  She states that she drinks 2 bottles of water prior to bedtime to take her medications  She does follow with Psychiatry and is compliant with her medicines  She states that she does snore in the middle the night  She states she was tested for sleep apnea over 20 years ago and it was negative at that time  She has been gaining some weight  She states that she does have chronic knee and back pain  She states that the specialist was giving her tramadol and she only takes at least twice a week  She would like a renewal prescription from this office  She does get occasional sciatic pain from her chronic low back pain  She has not noticed any increasing symptoms      She did bring her fit test and will drop it off at the lab      The following portions of the patient's history were reviewed and updated as appropriate:   She  has a past medical history of Bipolar affective disorder, depressed ([facility] [address] ), Colon polyp, GERD (gastroesophageal reflux disease), Heart murmur, Osteoarthritis, Psychiatric disorder, Subdural hematoma ([facility] [address] ), and Vitamin B12 deficiency  She   Patient Active Problem List    Diagnosis Date Noted    Chronic bilateral low back pain with sciatica 09/18/2020    Chronic fatigue 09/18/2020    Snoring 09/18/2020    Breast cancer screening 05/21/2020    History of colon polyps 05/05/2020    Hyponatremia 05/05/2020    Morbid obesity with BMI of 40 0-44 9, adult ([facility] [address] ) 03/17/2020    Medicare annual wellness visit, subsequent 03/17/2020    Mixed hyperlipidemia 03/17/2020    Seasonal allergic rhinitis due to pollen 01/23/2020    History of subarachnoid hemorrhage 01/22/2020    Localized osteoarthritis of left knee 01/22/2020    Stress incontinence 09/10/2019    [facility], [facility] episode [facility] 09/10/2019    Screening for breast cancer 08/23/2019    Screening for colon cancer 08/23/2019    Yeast infection of the skin 08/23/2019    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    Iron deficiency anemia secondary to inadequate dietary iron intake 05/17/2019    Dermatitis 05/06/2019    Pre-op examination 05/06/2019    Chronic pain of left knee 05/06/2019    Cervical spondylosis 01/09/2019    Cervical stenosis of spinal canal 12/13/2018    Arthropathy 10/03/2012    Neck pain 10/03/2012    GERD without esophagitis 10/03/2012    Insomnia 10/03/2012    Vitamin B-complex deficiency 10/03/2012     She  has a past surgical history that includes Knee surgery (Right, 01/30/2020); Cervical spine surgery (2002); Gastric bypass (2004); Foot fracture surgery (Right, 2012); Foot surgery (Left); Cholecystectomy (05/1985); Hysterectomy; and Breast lumpectomy (Right)    Her family history includes Breast cancer in her maternal grandfather, maternal grandmother, paternal aunt, paternal aunt, paternal grandfather, and paternal grandmother; Breast cancer (age of onset: 77) in her half-sister; Colon cancer in her maternal uncle; Coronary artery disease in her family; Diabetes in her father; Heart attack in her family; Heart disease in her father  She  reports that she has never smoked  She has never used smokeless tobacco  She reports current alcohol use  She reports that she does not use drugs    Current Outpatient Medications   Medication Sig Dispense Refill    acetaminophen (TYLENOL) 650 mg CR tablet 1 as directed for pain      ARIPiprazole (ABILIFY) 10 mg tablet       buprenorphine (Butrans) 15 mcg/hr Place 1 patch on the skin once a week      buPROPion (WELLBUTRIN) 100 mg tablet Take 200 mg by mouth 2 (two) times a day       busPIRone (BUSPAR) 15 mg tablet Take 15 mg by mouth 2 (two) times a day      clotrimazole-betamethasone (LOTRISONE) 1-0 05 % cream Apply topically 2 (two) times a day 30 g 0    DULoxetine (CYMBALTA) 60 mg delayed release capsule Take 120 mg by mouth daily       gabapentin (NEURONTIN) 800 mg tablet Take 1,600 mg by mouth daily at bedtime   0    hydrOXYzine HCL (ATARAX) 50 mg tablet Take 50 mg by mouth daily at bedtime  0    levocetirizine (XYZAL) 5 MG tablet Take 1 tablet (5 mg total) by mouth every evening 90 tablet 1    nystatin (MYCOSTATIN) cream Apply topically 2 (two) times a day 30 g 2    omeprazole (PriLOSEC) 20 mg delayed release capsule Take 20 mg by mouth 2 (two) times a day      oxyCODONE-acetaminophen (PERCOCET) 5-325 mg per tablet Take 1 tablet by mouth every 6 (six) hours as needed      QUEtiapine (SEROquel) 400 MG tablet Take 800 mg by mouth daily at bedtime  0    tiZANidine (ZANAFLEX) 4 mg tablet Take 4 mg by mouth 3 (three) times a day  0    traMADol (ULTRAM) 50 mg tablet Take 1 tablet daily as needed 30 tablet 0    polyethylene glycol (COLYTE) 4000 mL solution Take 240 g by mouth once for 1 dose 4000 mL 0     Current Facility-Administered Medications   Medication Dose Route Frequency Provider Last Rate Last Dose    cyanocobalamin injection 1,000 mcg  1,000 mcg Intramuscular Q30 Days Ivonne Daniel MD   1,000 mcg at 09/20/18 1527    cyanocobalamin injection 1,000 mcg  1,000 mcg Intramuscular Q30 Days Ivonne Daniel MD   1,000 mcg at 11/06/18 0853    cyanocobalamin injection 1,000 mcg  1,000 mcg Intramuscular Q30 Days Ivonne Daniel MD   1,000 mcg at 12/06/18 1631    cyanocobalamin injection 1,000 mcg  1,000 mcg Intramuscular Q30 Days Cat Hinton MD   1,000 mcg at 01/07/19 1443    cyanocobalamin injection 1,000 mcg  1,000 mcg Intramuscular Q30 Days Clinton Wallis MD   1,000 mcg at 03/08/19 1501    cyanocobalamin injection 1,000 mcg  1,000 mcg Intramuscular Q30 Days WHITNEY Hwang   1,000 mcg at 03/11/19 1644    cyanocobalamin injection 1,000 mcg  1,000 mcg Intramuscular Q30 Days Cat Hinton MD   1,000 mcg at 04/08/19 1317    cyanocobalamin injection 1,000 mcg  1,000 mcg Intramuscular Q30 Days Cat Hinton MD   1,000 mcg at 07/01/19 1529    cyanocobalamin injection 1,000 mcg  1,000 mcg Intramuscular Q30 Days Cat Hinton MD   1,000 mcg at 08/02/19 1311    cyanocobalamin injection 1,000 mcg  1,000 mcg Intramuscular Q30 Days Cat Hinton MD   1,000 mcg at 09/03/19 1313    cyanocobalamin injection 1,000 mcg  1,000 mcg Intramuscular Q30 Days Cat Hinton MD   1,000 mcg at 10/07/19 1414    cyanocobalamin injection 1,000 mcg  1,000 mcg Intramuscular Q30 Days Cat Hinton MD   1,000 mcg at 11/21/19 1232    cyanocobalamin injection 1,000 mcg  1,000 mcg Intramuscular Q30 Days Cat Hinton MD   1,000 mcg at 12/23/19 1214    cyanocobalamin injection 1,000 mcg  1,000 mcg Intramuscular Q30 Days Franci Vergara PA-C   1,000 mcg at 01/23/20 1353    cyanocobalamin injection 1,000 mcg  1,000 mcg Intramuscular Q30 Days Franci Vergara PA-C   1,000 mcg at 03/17/20 1145    cyanocobalamin injection 1,000 mcg  1,000 mcg Intramuscular Q30 Days Franci Vergara PA-C 1,000 mcg at 04/14/20 1338    cyanocobalamin injection 1,000 mcg  1,000 mcg Intramuscular Q30 Days Franci Vergara PA-C   1,000 mcg at 05/21/20 1345    cyanocobalamin injection 1,000 mcg  1,000 mcg Intramuscular Q30 Days Nani Núñez MD   1,000 mcg at 06/24/20 1035    cyanocobalamin injection 1,000 mcg  1,000 mcg Intramuscular Q30 Days Franci Vergara PA-C         Current Outpatient Medications on File Prior to Visit   Medication Sig    acetaminophen (TYLENOL) 650 mg CR tablet 1 as directed for pain    ARIPiprazole (ABILIFY) 10 mg tablet     buprenorphine (Butrans) 15 mcg/hr Place 1 patch on the skin once a week    buPROPion (WELLBUTRIN) 100 mg tablet Take 200 mg by mouth 2 (two) times a day     busPIRone (BUSPAR) 15 mg tablet Take 15 mg by mouth 2 (two) times a day    clotrimazole-betamethasone (LOTRISONE) 1-0 05 % cream Apply topically 2 (two) times a day    DULoxetine (CYMBALTA) 60 mg delayed release capsule Take 120 mg by mouth daily     gabapentin (NEURONTIN) 800 mg tablet Take 1,600 mg by mouth daily at bedtime     hydrOXYzine HCL (ATARAX) 50 mg tablet Take 50 mg by mouth daily at bedtime    levocetirizine (XYZAL) 5 MG tablet Take 1 tablet (5 mg total) by mouth every evening    nystatin (MYCOSTATIN) cream Apply topically 2 (two) times a day    omeprazole (PriLOSEC) 20 mg delayed release capsule Take 20 mg by mouth 2 (two) times a day    oxyCODONE-acetaminophen (PERCOCET) 5-325 mg per tablet Take 1 tablet by mouth every 6 (six) hours as needed    QUEtiapine (SEROquel) 400 MG tablet Take 800 mg by mouth daily at bedtime    tiZANidine (ZANAFLEX) 4 mg tablet Take 4 mg by mouth 3 (three) times a day    [DISCONTINUED] traMADol (ULTRAM) 50 mg tablet Take 50 mg by mouth every 8 (eight) hours as needed    polyethylene glycol (COLYTE) 4000 mL solution Take 240 g by mouth once for 1 dose     Current Facility-Administered Medications on File Prior to Visit   Medication    cyanocobalamin injection 1,000 mcg    cyanocobalamin injection 1,000 mcg    cyanocobalamin injection 1,000 mcg    cyanocobalamin injection 1,000 mcg    cyanocobalamin injection 1,000 mcg    cyanocobalamin injection 1,000 mcg    cyanocobalamin injection 1,000 mcg    cyanocobalamin injection 1,000 mcg    cyanocobalamin injection 1,000 mcg    cyanocobalamin injection 1,000 mcg    cyanocobalamin injection 1,000 mcg    cyanocobalamin injection 1,000 mcg    cyanocobalamin injection 1,000 mcg    cyanocobalamin injection 1,000 mcg    cyanocobalamin injection 1,000 mcg    cyanocobalamin injection 1,000 mcg    cyanocobalamin injection 1,000 mcg    cyanocobalamin injection 1,000 mcg     She is allergic to prochlorperazine; rofecoxib; and erythromycin base       Review of Systems   Constitutional: Positive for fatigue  Respiratory: Negative for cough and shortness of breath  Cardiovascular: Negative for chest pain and leg swelling  Musculoskeletal:        As stated in HPI         Objective:      /76 (BP Location: Left arm, Patient Position: Sitting, Cuff Size: Large)   Pulse 82   Temp 98 2 °F (36 8 °C) (Tympanic)   Resp 16   Ht 5' 4" (1 626 m)   Wt 115 kg (253 lb 4 oz)   SpO2 93%   BMI 43 47 kg/m²          Physical Exam  Constitutional:       General: She is not in acute distress  Appearance: Normal appearance  She is well-developed  She is obese  She is not ill-appearing, toxic-appearing or diaphoretic  Comments: Patient ambulates with a cane   HENT:      Head: Normocephalic and atraumatic  Right Ear: External ear normal       Left Ear: External ear normal    Neck:      Musculoskeletal: Neck supple  Thyroid: No thyromegaly  Cardiovascular:      Rate and Rhythm: Normal rate and regular rhythm  Heart sounds: Normal heart sounds  No murmur  No friction rub  No gallop  Pulmonary:      Effort: Pulmonary effort is normal  No respiratory distress  Breath sounds: Normal breath sounds   No wheezing, rhonchi or rales  Abdominal:      General: Bowel sounds are normal       Palpations: Abdomen is soft  There is no mass  Tenderness: There is no abdominal tenderness  Musculoskeletal:         General: No deformity  Right lower leg: No edema  Left lower leg: No edema  Lymphadenopathy:      Cervical: No cervical adenopathy  Skin:     General: Skin is warm  Neurological:      General: No focal deficit present  Mental Status: She is alert     Psychiatric:         Mood and Affect: Mood normal  Xeljanz Counseling: I discussed with the patient the risks of Xeljanz therapy including increased risk of infection, liver issues, headache, diarrhea, or cold symptoms. Live vaccines should be avoided. They were instructed to call if they have any problems.

## 2023-06-19 ENCOUNTER — CLINICAL SUPPORT (OUTPATIENT)
Dept: FAMILY MEDICINE CLINIC | Facility: CLINIC | Age: 63
End: 2023-06-19
Payer: MEDICARE

## 2023-06-19 DIAGNOSIS — K21.9 GERD WITHOUT ESOPHAGITIS: ICD-10-CM

## 2023-06-19 DIAGNOSIS — E53.8 VITAMIN B 12 DEFICIENCY: Primary | ICD-10-CM

## 2023-06-19 PROCEDURE — 96372 THER/PROPH/DIAG INJ SC/IM: CPT

## 2023-06-19 RX ORDER — CYANOCOBALAMIN 1000 UG/ML
1000 INJECTION, SOLUTION INTRAMUSCULAR; SUBCUTANEOUS
Status: SHIPPED | OUTPATIENT
Start: 2023-06-19

## 2023-06-19 RX ORDER — OMEPRAZOLE 40 MG/1
CAPSULE, DELAYED RELEASE ORAL
Qty: 90 CAPSULE | Refills: 1 | Status: SHIPPED | OUTPATIENT
Start: 2023-06-19

## 2023-06-19 RX ADMIN — CYANOCOBALAMIN 1000 MCG: 1000 INJECTION, SOLUTION INTRAMUSCULAR; SUBCUTANEOUS at 13:05

## 2023-07-16 PROBLEM — K52.9 GASTROENTERITIS: Status: RESOLVED | Noted: 2023-05-17 | Resolved: 2023-07-16

## 2023-07-21 ENCOUNTER — CLINICAL SUPPORT (OUTPATIENT)
Dept: FAMILY MEDICINE CLINIC | Facility: CLINIC | Age: 63
End: 2023-07-21
Payer: MEDICARE

## 2023-07-21 DIAGNOSIS — E53.8 VITAMIN B 12 DEFICIENCY: Primary | ICD-10-CM

## 2023-07-21 PROCEDURE — 96372 THER/PROPH/DIAG INJ SC/IM: CPT

## 2023-07-21 RX ORDER — CYANOCOBALAMIN 1000 UG/ML
1000 INJECTION, SOLUTION INTRAMUSCULAR; SUBCUTANEOUS
Status: SHIPPED | OUTPATIENT
Start: 2023-07-21

## 2023-07-21 RX ADMIN — CYANOCOBALAMIN 1000 MCG: 1000 INJECTION, SOLUTION INTRAMUSCULAR; SUBCUTANEOUS at 13:11

## 2023-08-03 DIAGNOSIS — J30.1 SEASONAL ALLERGIC RHINITIS DUE TO POLLEN: ICD-10-CM

## 2023-08-04 DIAGNOSIS — J30.1 SEASONAL ALLERGIC RHINITIS DUE TO POLLEN: ICD-10-CM

## 2023-08-04 RX ORDER — LEVOCETIRIZINE DIHYDROCHLORIDE 5 MG/1
TABLET, FILM COATED ORAL
Qty: 90 TABLET | Refills: 0 | Status: SHIPPED | OUTPATIENT
Start: 2023-08-04

## 2023-08-04 RX ORDER — LEVOCETIRIZINE DIHYDROCHLORIDE 5 MG/1
5 TABLET, FILM COATED ORAL EVERY EVENING
Qty: 90 TABLET | Refills: 0 | Status: CANCELLED | OUTPATIENT
Start: 2023-08-04

## 2023-08-24 ENCOUNTER — CLINICAL SUPPORT (OUTPATIENT)
Dept: FAMILY MEDICINE CLINIC | Facility: CLINIC | Age: 63
End: 2023-08-24
Payer: MEDICARE

## 2023-08-24 DIAGNOSIS — E53.9 VITAMIN B-COMPLEX DEFICIENCY: Primary | ICD-10-CM

## 2023-08-24 PROCEDURE — 96372 THER/PROPH/DIAG INJ SC/IM: CPT

## 2023-08-24 RX ORDER — CYANOCOBALAMIN 1000 UG/ML
1000 INJECTION, SOLUTION INTRAMUSCULAR; SUBCUTANEOUS
Status: SHIPPED | OUTPATIENT
Start: 2023-08-24

## 2023-08-24 RX ADMIN — CYANOCOBALAMIN 1000 MCG: 1000 INJECTION, SOLUTION INTRAMUSCULAR; SUBCUTANEOUS at 11:15

## 2023-09-25 ENCOUNTER — CLINICAL SUPPORT (OUTPATIENT)
Dept: FAMILY MEDICINE CLINIC | Facility: CLINIC | Age: 63
End: 2023-09-25
Payer: MEDICARE

## 2023-09-25 VITALS — HEIGHT: 63 IN | BODY MASS INDEX: 27.72 KG/M2

## 2023-09-25 DIAGNOSIS — E53.8 VITAMIN B 12 DEFICIENCY: Primary | ICD-10-CM

## 2023-09-25 PROCEDURE — 96372 THER/PROPH/DIAG INJ SC/IM: CPT

## 2023-09-25 RX ORDER — CYANOCOBALAMIN 1000 UG/ML
1000 INJECTION, SOLUTION INTRAMUSCULAR; SUBCUTANEOUS
Status: SHIPPED | OUTPATIENT
Start: 2023-09-25

## 2023-09-25 RX ADMIN — CYANOCOBALAMIN 1000 MCG: 1000 INJECTION, SOLUTION INTRAMUSCULAR; SUBCUTANEOUS at 12:49

## 2023-10-10 ENCOUNTER — CONSULT (OUTPATIENT)
Dept: GASTROENTEROLOGY | Facility: MEDICAL CENTER | Age: 63
End: 2023-10-10
Payer: MEDICARE

## 2023-10-10 VITALS
TEMPERATURE: 96.7 F | BODY MASS INDEX: 27.74 KG/M2 | WEIGHT: 154.1 LBS | HEART RATE: 93 BPM | SYSTOLIC BLOOD PRESSURE: 122 MMHG | DIASTOLIC BLOOD PRESSURE: 84 MMHG

## 2023-10-10 DIAGNOSIS — D50.9 IRON DEFICIENCY ANEMIA, UNSPECIFIED IRON DEFICIENCY ANEMIA TYPE: ICD-10-CM

## 2023-10-10 DIAGNOSIS — R19.7 DIARRHEA, UNSPECIFIED TYPE: ICD-10-CM

## 2023-10-10 DIAGNOSIS — R11.0 NAUSEA: Primary | ICD-10-CM

## 2023-10-10 DIAGNOSIS — K21.9 GERD WITHOUT ESOPHAGITIS: ICD-10-CM

## 2023-10-10 DIAGNOSIS — Z86.010 HISTORY OF COLON POLYPS: ICD-10-CM

## 2023-10-10 PROCEDURE — 99204 OFFICE O/P NEW MOD 45 MIN: CPT | Performed by: STUDENT IN AN ORGANIZED HEALTH CARE EDUCATION/TRAINING PROGRAM

## 2023-10-10 NOTE — PROGRESS NOTES
[facility]  Chago Medina 60 y.o. female MRN: [id number]  Encounter: [id number]          ASSESSMENT AND PLAN:    44-year-old female with a history of RYGB, depression/anxiety here for atypical GERD symptoms and nausea. Symptoms are suggestive of a peptic process such as reflux, gastritis, or marginal ulcer. Motility disorder is also possible. We will pursue EGD. 1. GERD without esophagitis  2. Nausea  - Ambulatory Referral to Gastroenterology  - EGD; Future  - Colonoscopy; Future  -Continue esomeprazole 40 mg daily. Patient reports she has tried famotidine in the past with no improvement. 3. Diarrhea, unspecified type  Small bowel and colon biopsies to assess for celiac and microscopic colitis  - EGD; Future  - Colonoscopy; Future    4. Iron deficiency anemia, unspecified iron deficiency anemia type  - EGD; Future  - Colonoscopy; Future    5. History of colon polyps  Patient unsure of when her last colonoscopy was. Does not think she had polyps though her prior GI notes report polyps. Will get colonoscopy as above.    ______________________________________________________________________    HPI:    Last seen in 2020 for GERD and iron deficiency. Was recommended to have EGD and colonoscopy that was not performed. Gets bad taste in her mouth, nauseated and then drinks a small amount of water and has to vomit that back up. Nexium does not help. Some days takes an omeprazole in the afternoon. Grandchildren have been diagnosed with celiac. Bowels are variable, can eat something one day and then gets diarrhea after eating. Has BM everyday, sometimes 3. No blood in stool. No abdominal pain. Uses benefiber at night    Has longstanding iron deficiency anemia. Has not gotten iron infusions recently. Colonoscopy years ago, doesn't think there were polyps    No NSAID use. Paternal uncle had colon cancer.     REVIEW OF SYSTEMS:    CONSTITUTIONAL: Denies any fever, chills, rigors, and weight loss. HEENT: No earache or tinnitus. Denies hearing loss or visual disturbances. CARDIOVASCULAR: No chest pain or palpitations. RESPIRATORY: Denies any cough, hemoptysis, shortness of breath or dyspnea on exertion. GASTROINTESTINAL: As noted in the History of Present Illness. GENITOURINARY: No problems with urination. Denies any hematuria or dysuria. NEUROLOGIC: No dizziness or vertigo, denies headaches. MUSCULOSKELETAL: Denies any muscle or joint pain. SKIN: Denies skin rashes or itching. ENDOCRINE: Denies excessive thirst. Denies intolerance to heat or cold. PSYCHOSOCIAL: Denies depression or anxiety. Denies any recent memory loss.        Historical Information   Past Medical History:   Diagnosis Date   • Bipolar affective disorder, depressed ([address])    • Colon polyp    • GERD (gastroesophageal reflux disease)    • Heart murmur    • Osteoarthritis    • Psychiatric disorder    • Subdural hematoma (HCC)    • Vitamin B12 deficiency      Past Surgical History:   Procedure Laterality Date   • BREAST LUMPECTOMY Right     benign   • CERVICAL SPINE SURGERY  2002   • CHOLECYSTECTOMY  05/1985   • FOOT FRACTURE SURGERY Right 2012    outcome - good   • FOOT SURGERY Left    • GASTRIC BYPASS  2004    x2- 2nd bypass 10/12   • HYSTERECTOMY      age 54   • KNEE SURGERY Right 01/30/2020     Social History   Social History     Substance and Sexual Activity   Alcohol Use Yes     Social History     Substance and Sexual Activity   Drug Use No     Social History     Tobacco Use   Smoking Status Never   Smokeless Tobacco Never   Tobacco Comments    passive smoke exposure     Family History   Problem Relation Age of Onset   • Diabetes Father    • Heart disease Father    • Coronary artery disease Family    • Heart attack Family    • No Known Problems Mother    • Breast cancer Maternal Grandmother    • Breast cancer Maternal Grandfather    • Breast cancer Paternal Grandmother    • Breast cancer Paternal Grandfather    • Breast cancer Half-Sister 77   • Breast cancer Paternal Aunt    • Breast cancer Paternal Aunt    • Colon cancer Maternal Uncle    • No Known Problems Son        Meds/Allergies       Current Outpatient Medications:   •  acetaminophen (TYLENOL) 650 mg CR tablet  •  ARIPiprazole (ABILIFY) 10 mg tablet  •  buprenorphine (BUTRANS) 15 mcg/hr  •  buPROPion (WELLBUTRIN) 100 mg tablet  •  busPIRone (BUSPAR) 15 mg tablet  •  clotrimazole-betamethasone (LOTRISONE) 1-0.05 % cream  •  DULoxetine (CYMBALTA) 30 mg delayed release capsule  •  DULoxetine (CYMBALTA) 60 mg delayed release capsule  •  esomeprazole (NexIUM) 40 MG capsule  •  gabapentin (NEURONTIN) 600 MG tablet  •  gabapentin (NEURONTIN) 800 mg tablet  •  hydrOXYzine HCL (ATARAX) 50 mg tablet  •  levocetirizine (XYZAL) 5 MG tablet  •  LORazepam (ATIVAN) 0.5 mg tablet  •  nystatin (MYCOSTATIN) cream  •  omeprazole (PriLOSEC) 40 MG capsule  •  ondansetron (ZOFRAN) 4 mg tablet  •  oxyCODONE-acetaminophen (PERCOCET) 5-325 mg per tablet  •  phentermine 37.5 MG capsule  •  polyethylene glycol (COLYTE) 4000 mL solution  •  QUEtiapine (SEROquel) 400 MG tablet  •  Rexulti 3 MG tablet  •  tiZANidine (ZANAFLEX) 4 mg tablet  •  traMADol (ULTRAM) 50 mg tablet  •  traZODone (DESYREL) 100 mg tablet  •  Trintellix 10 MG tablet  •  Trintellix 5 MG tablet    Current Facility-Administered Medications:   •  cyanocobalamin injection 1,000 mcg, 1,000 mcg, Intramuscular, Q30 Days, 1,000 mcg at 04/15/21 1415  •  cyanocobalamin injection 1,000 mcg, 1,000 mcg, Intramuscular, Q30 Days, 1,000 mcg at 05/18/21 1408  •  cyanocobalamin injection 1,000 mcg, 1,000 mcg, Intramuscular, Q30 Days, 1,000 mcg at 06/18/21 1325  •  cyanocobalamin injection 1,000 mcg, 1,000 mcg, Intramuscular, Q30 Days, 1,000 mcg at 08/31/21 1045  •  cyanocobalamin injection 1,000 mcg, 1,000 mcg, Intramuscular, Q30 Days, 1,000 mcg at 09/24/21 1157  •  cyanocobalamin injection 1,000 mcg, 1,000 mcg, Intramuscular, Q30 Days, 1,000 mcg at 10/27/21 1118  •  cyanocobalamin injection 1,000 mcg, 1,000 mcg, Intramuscular, Q30 Days, 1,000 mcg at 01/06/22 1359  •  cyanocobalamin injection 1,000 mcg, 1,000 mcg, Intramuscular, Q30 Days, 1,000 mcg at 02/03/22 1316  •  cyanocobalamin injection 1,000 mcg, 1,000 mcg, Intramuscular, Q30 Days, 1,000 mcg at 03/14/22 1323  •  cyanocobalamin injection 1,000 mcg, 1,000 mcg, Intramuscular, Q30 Days, 1,000 mcg at 04/19/22 1308  •  cyanocobalamin injection 1,000 mcg, 1,000 mcg, Intramuscular, Q30 Days, 1,000 mcg at 05/18/22 1508  •  cyanocobalamin injection 1,000 mcg, 1,000 mcg, Intramuscular, Q30 Days, 1,000 mcg at 06/20/22 1303  •  cyanocobalamin injection 1,000 mcg, 1,000 mcg, Intramuscular, Q30 Days, 1,000 mcg at 07/22/22 1314  •  cyanocobalamin injection 1,000 mcg, 1,000 mcg, Intramuscular, Q30 Days, 1,000 mcg at 08/22/22 1311  •  cyanocobalamin injection 1,000 mcg, 1,000 mcg, Intramuscular, Q30 Days, 1,000 mcg at 09/19/22 1324  •  cyanocobalamin injection 1,000 mcg, 1,000 mcg, Intramuscular, Q30 Days, 1,000 mcg at 10/24/22 1325  •  cyanocobalamin injection 1,000 mcg, 1,000 mcg, Intramuscular, Q30 Days, 1,000 mcg at 11/28/22 1442  •  cyanocobalamin injection 1,000 mcg, 1,000 mcg, Intramuscular, Q30 Days, 1,000 mcg at 12/28/22 1404  •  cyanocobalamin injection 1,000 mcg, 1,000 mcg, Intramuscular, Q30 Days, 1,000 mcg at 01/30/23 1453  •  cyanocobalamin injection 1,000 mcg, 1,000 mcg, Intramuscular, Q30 Days, 1,000 mcg at 03/03/23 1333  •  cyanocobalamin injection 1,000 mcg, 1,000 mcg, Intramuscular, Q30 Days, 1,000 mcg at 04/11/23 1314  •  cyanocobalamin injection 1,000 mcg, 1,000 mcg, Intramuscular, Q30 Days, 1,000 mcg at 05/17/23 1321  •  cyanocobalamin injection 1,000 mcg, 1,000 mcg, Intramuscular, Q30 Days, 1,000 mcg at 06/19/23 1305  •  cyanocobalamin injection 1,000 mcg, 1,000 mcg, Intramuscular, Q30 Days, 1,000 mcg at 07/21/23 1311  •  cyanocobalamin injection 1,000 mcg, 1,000 mcg, Intramuscular, Q30 Days, 1,000 mcg at 08/24/23 1115  •  cyanocobalamin injection 1,000 mcg, 1,000 mcg, Intramuscular, Q30 Days, 1,000 mcg at 09/25/23 1249    Allergies   Allergen Reactions   • Prochlorperazine Other (See Comments)     seizure,swollen tongue   • Rofecoxib Other (See Comments)     internal bleeding   • Erythromycin Base Other (See Comments)     seizure           Objective   /84 (BP Location: Left arm)   Pulse 93   Temp (!) 96.7 °F (35.9 °C)   Wt 69.9 kg (154 lb 1.6 oz)   BMI 27.74 kg/m²     PHYSICAL EXAM:      General Appearance:   Alert, cooperative, no distress   HEENT:   Normocephalic, atraumatic, anicteric. Neck:  Supple, symmetrical, trachea midline   Lungs:   Clear to auscultation bilaterally; no rales, rhonchi or wheezing; respirations unlabored    Heart[de-identified]   Regular rate and rhythm; no murmur, rub, or gallop. Abdomen:   Soft, non-tender, non-distended; normal bowel sounds; no masses, no organomegaly    Genitalia:   Deferred    Rectal:   Deferred    Extremities:  No cyanosis, clubbing or edema    Pulses:  2+ and symmetric    Skin:  No jaundice, rashes, or lesions    Lymph nodes:  No palpable cervical lymphadenopathy        Lab Results:   No visits with results within 1 Day(s) from this visit. Latest known visit with results is:   Orders Only on 03/22/2023   Component Date Value   • Hemoglobin A1C 03/22/2023 5.4          Radiology Results:   No results found.

## 2023-10-24 ENCOUNTER — CLINICAL SUPPORT (OUTPATIENT)
Dept: FAMILY MEDICINE CLINIC | Facility: CLINIC | Age: 63
End: 2023-10-24
Payer: MEDICARE

## 2023-10-24 DIAGNOSIS — Z23 NEED FOR INFLUENZA VACCINATION: Primary | ICD-10-CM

## 2023-10-24 DIAGNOSIS — E53.8 VITAMIN B 12 DEFICIENCY: ICD-10-CM

## 2023-10-24 DIAGNOSIS — K21.9 GERD WITHOUT ESOPHAGITIS: ICD-10-CM

## 2023-10-24 PROCEDURE — 96372 THER/PROPH/DIAG INJ SC/IM: CPT

## 2023-10-24 PROCEDURE — 90686 IIV4 VACC NO PRSV 0.5 ML IM: CPT

## 2023-10-24 PROCEDURE — G0008 ADMIN INFLUENZA VIRUS VAC: HCPCS

## 2023-10-24 RX ORDER — OMEPRAZOLE 40 MG/1
40 CAPSULE, DELAYED RELEASE ORAL DAILY
Qty: 90 CAPSULE | Refills: 1 | Status: SHIPPED | OUTPATIENT
Start: 2023-10-24

## 2023-10-24 RX ORDER — CYANOCOBALAMIN 1000 UG/ML
1000 INJECTION, SOLUTION INTRAMUSCULAR; SUBCUTANEOUS
Status: SHIPPED | OUTPATIENT
Start: 2023-10-24

## 2023-10-24 RX ADMIN — CYANOCOBALAMIN 1000 MCG: 1000 INJECTION, SOLUTION INTRAMUSCULAR; SUBCUTANEOUS at 12:08

## 2023-10-31 ENCOUNTER — ANESTHESIA EVENT (OUTPATIENT)
Dept: ANESTHESIOLOGY | Facility: HOSPITAL | Age: 63
End: 2023-10-31

## 2023-10-31 ENCOUNTER — ANESTHESIA (OUTPATIENT)
Dept: ANESTHESIOLOGY | Facility: HOSPITAL | Age: 63
End: 2023-10-31

## 2023-11-02 ENCOUNTER — TELEPHONE (OUTPATIENT)
Dept: GASTROENTEROLOGY | Facility: MEDICAL CENTER | Age: 63
End: 2023-11-02

## 2023-11-02 NOTE — TELEPHONE ENCOUNTER
Reason for visit/ICD10: (E11.65) Type 2 diabetes mellitus with hyperglycemia, unspecified whether long term insulin use (CMS/[facility])    Start Time:  2:05 pm  End Time: 3:05  pm    Height: 65\" Actual Current Weight: 221.5 #  Current BMI: 36.9    If follow up visit with clinic: Weight at last visit: 221.5# Weight/BMI at first visit: 214.9#/35.8    Labs: Reference Range Last Labs   Hgb A1c 4.5-5.9% Hemoglobin A1C (%)   Date Value   08/15/2022 9.2 (H)   05/13/2022 9.0 (H)   04/06/2022 8.5 (H)     No results found for: [id number]   Glucose 65-99 mg/dL Glucose (mg/dL)   Date Value   08/15/2022 121 (H)   05/13/2022 78   04/06/2022 95      Total Cholesterol 100-200 mg/dL Cholesterol (mg/dL)   Date Value   08/15/2022 171   04/06/2022 186   06/04/2021 192       HDL >39 mg/dL HDL (mg/dL)   Date Value   08/15/2022 42 (L)   04/06/2022 51   06/04/2021 42 (L)      LDL <130 mg/dL LDL (mg/dL)   Date Value   08/15/2022 115   04/06/2022 116   06/04/2021 136 (H)      TG <150 mg/dL Triglycerides (mg/dL)   Date Value   08/15/2022 68   04/06/2022 94   06/04/2021 71      BUN 6-20 mg/dL  BUN (mg/dL)   Date Value   08/15/2022 15   05/13/2022 18   04/06/2022 14      CR 0.51-0.95 mg/dL Creatinine (mg/dL)   Date Value   08/15/2022 0.88   05/13/2022 0.88   04/06/2022 0.89      GFR >90 GFR Estimate, Non  (no units)   Date Value   03/25/2019 >90   03/25/2019 >90   03/25/2019     eGFR results = or >90 mL/min/1.73m2 = Normal kidney function.      GFR Estimate,  (no units)   Date Value   03/25/2019 >90   03/25/2019     eGFR results = or >90 mL/min/1.73m2 = Normal kidney function.   03/25/2019 >90      Lab Results   Component Value Date    GFRESTIMATE 83 08/15/2022    GFRESTIMATE 83 05/13/2022    GFRESTIMATE >90 04/06/2022    GFRESTIMATE >90 09/13/2021    GFRESTIMATE >90 06/04/2021      Urine A/C <30 mg/g Microalbumin/ Creatinine Ratio (mg/g)   Date Value   08/15/2022 616.8 (H)   04/06/2022 304.5 (H)   03/24/2021 588.0 (H)  Spoke with patient to confirm 11/22 procedure. No questions at this time. Patient stated has prep instructions.       C-peptide    Glutamic Acid Decarboxylase antibody    Insulin Antibody 0.8-3.9 ng/ml    0.0 - 5.0 IU/mL        0.0 - 0.4 U/mL No results found for: CPEPT    No results found for: GADAB        No results found for: INSULA   Vitamin D,25 Hydroxy 30.0-100.0 ng/ml VITAMIND, 25 HYDROXY (ng/ml)   Date Value   08/15/2018 23.3 (L)     Vitamin D, 25-Hydroxy (ng/mL)   Date Value   04/06/2022 50.6   03/24/2021 12.8 (L)            Last eye exam: 7/2022, retinopathy, injections in left eye     A1c at first visit: 9% (5/13/2022)  A1c at last visit: 9.2% (8/15/2022)    Past Medical History:  Past Medical History:   Diagnosis Date   • Abdominal pain 6/23/2014   • Abdominal pain 6/23/2014   • Acute bronchitis 12/26/2018   • Adenomyosis 12/10/2012   • Allergic dermatitis 7/31/2013   • Allergic rhinitis 11/3/2014   • Allergic urticaria 7/11/2011   • Anemia 4/3/2013   • Angioedema 11/27/2013   • Avulsion fracture of middle phalanx of finger 12/23/2013   • Breast cancer (CMS/[facility])    • Breast cancer metastasized to axillary lymph node, left (CMS/[facility]) 10/20/2015   • Conjunctivitis, acute 9/28/2018   • Diabetes mellitus (CMS/[facility])    • Fibromyalgia    • History of lumpectomy of left breast 12/26/2018   • Malignant neoplasm of areola of left breast in female, estrogen receptor negative (CMS/[facility]) 12/26/2018   • Mass of left axilla 8/31/2015   • Menorrhagia 12/10/2012   • Neuropathy    • Retinal detachment, tractional, left eye 5/18/2022   • Vitamin D deficiency 10/4/2012     Patient Active Problem List   Diagnosis   • Fibromyalgia   • Acute leg pain, left   • Acute pain of right shoulder   • Adverse food reaction   • Arthralgia of multiple sites   • Benign colonic polyp   • Bilateral low back pain with left-sided sciatica   • Chest pain, musculoskeletal   • Insomnia   • Ganglion cyst   • Left hip pain   • TMJ (temporomandibular joint disorder)   • Bilateral lower extremity edema   • Lung nodule   • Lymphadenopathy, axillary   • Muscle  spasm of back   • Nausea with vomiting   • Palpitations   • Raynaud's disease   • Sensory motor neuropathy   • Shortness of breath   • Swelling of hand   • Syncope   • Tachycardia   • Multiple environmental allergies   • Type 1 diabetes mellitus with proliferative retinopathy and traction retinal detachment involving macula (CMS/HCC)   • Type 1 diabetes mellitus with diabetic autonomic neuropathy (CMS/HCC)   • Diabetic gastroparesis (CMS/HCC)   • Uncontrolled type 1 diabetes mellitus with hyperglycemia, with long-term current use of insulin (CMS/HCC)       Family History   Problem Relation Age of Onset   • Diabetes Mother    • Hypertension Mother    • Rheumatoid Arthritis Mother    • Cancer Father         colon   • Rheumatoid Arthritis Sister    • Patient is unaware of any medical problems Brother    • Hypertension Brother    • Diabetes Brother    • Diabetes Son    • Diabetes Daughter        Medications:   Current Outpatient Medications   Medication Sig   • pregabalin (LYRICA) 100 MG capsule Take 100 mg by mouth in the morning and 100 mg in the evening.   • Basaglar KwikPen 100 UNIT/ML pen-injector ADMINISTER 28 UNITS UNDER THE SKIN DAILY   • Vitamin D, Ergocalciferol, 1.25 mg (50,000 units) capsule    • OneTouch Verio test strip USE AS DIRECTED TO TEST THREE TIMES DAILY   • Gvoke HypoPen 2-Pack 1 MG/0.2ML Solution Auto-injector Inject 1 mg into the skin as needed (severe hypoglcyemia).   • Continuous Blood Gluc  (Dexcom G6 ) Device 1 each continuous. Use to monitor glucose levels as directed   • Continuous Blood Gluc Transmit (Dexcom G6 Transmitter) Misc 1 each every 3 months. Change transmitter once every 3 months to monitor glucose levels   • Continuous Blood Gluc Sensor (Dexcom G6 Sensor) Misc Insert new sensor every 10 days.   • Insulin Lispro, 1 Unit Dial, (HumaLOG KwikPen) 100 UNIT/ML pen-injector Inject 8 Units into the skin in the morning and 8 Units at noon and 8 Units in the evening.  Inject before meals. Prime 2 units before each dose.   • montelukast (SINGULAIR) 10 MG tablet Take 10 mg by mouth nightly.   • Turmeric (QC TUMERIC COMPLEX PO)    • tamoxifen (NOLVADEX) 20 MG tablet Take 10 mg by mouth daily.    • BD Pen Needle Mara 2nd Gen 32G X 4 MM Misc USE FOUR TIMES DAILY AS DIRECTED   • aspirin 81 MG chewable tablet    • ferrous sulfate 325 (65 FE) MG tablet    • B Complex Vitamins (VITAMIN B COMPLEX) tablet      No current facility-administered medications for this visit.     Taking 25 units of Lantus daily  Taking 7 units of Humalog with meals    Monitoring:  Do you test your blood sugar at home? [x]Y [] N  FB-140 mg/dL; hypoglycemic events daily    Exercise:  Tell me about the role of exercise in your life? None    Nutrition Assessment/ Social History:   Who does the cooking/shopping? Self/Daughter  How often do you eat out?  1 time per week    Nutrition Intervention/Education Provided:  [x]CHO containing food groups /c food list  [x]Portion Sizes    [x]BG/A1c goals/log book     [x]Sample meal pattern   [x]Beverage Choices     [x]Reading Food Labels   []Hyper/hypoglycemia signs/symptoms   []Nutrition/Exercise Phone Apps         []Exercise                     []Healthy Foods to Choose  []Medication review     [x]Meal Ideas-Vegetarian  []Sodium  [x]Other:Sick Guidelines      Comments:  Follow up visit for (E11.65) Type 2 diabetes mellitus with hyperglycemia, unspecified whether long term insulin use (CMS/[facility]) nutritional counseling.       Maki reports eating 2 meals/day, continues to drink regular soda, has been using juice to correct hypoglycemia and has decreased fried fatty foods.  Pt has been wearing the Freestyle Rimma 3, data reviewed.  Maki reports feeling ill after eating Saturday night, had n/v x2 and diarrhea, did not take insulin until early  morning. Reviewed sick day guidelines.   Pt continues to have hypoglycemic events, pt started titrating insulin doses and has  been taking 7 units of Humalog with meals and 25 units of Lantus. Instructed pt on writing down all food/beverages to help identify what pt is eating and the effects it is having on her blood sugar.  Encouraged pt to avoid fried/fatty/high fiber foods, eat smaller meals and eat a snack/meal 30-45 minutes after treating a hypoglycemic event to help keep blood sugar in target range.     Next visit scheduled November 22nd with APN.    24 hr. diet history:   I was sick, possibly food poisoning saturday    3-4 cans of Ginger ale-sipped on throughout the day  water  9 pm: lasagna-few bites    3 am-Blood sugar 65 mg/dl-4 oz of orange juice    Eats 2-3 meals/day with occasional snacks  Beverages: water (3-4 bottles), Mountain Dew (1-2 times per week)    Monitoring/Evaluation:  Receptive to Education: Good         Understanding of Education:  Good      Expected Level of Compliance:  Good    Goals:   1. Limit carbohydrates at meals. Aim for 30 grams of carbohydrates or less at meals. Choose mostly non-starchy vegetables and lean protein. MET 75%       Avoid fried/fatty/high fiber foods.         Total hours of MNT this calendar year (3 hours initial MNT in calendar year, 2 hours annual follow up):   3.0 hours billed annually  Total hours of DSMT (10 hours initial DSMT in 12 month period from the date of first visit (date of 1st visit):   0 hours billed    Topics to discuss at next visits: Healthy Foods to Choose, Food Rules, Mindful Eating Placemat, Obesity and Hormones, Shopping List, Recipes, \"Cheat\" Sheet, Inflammation, Phone Apps  Sick Guidelines, Halloween tips, Fast Chad      CGM Data:

## 2023-11-21 ENCOUNTER — TELEPHONE (OUTPATIENT)
Age: 63
End: 2023-11-21

## 2023-11-22 ENCOUNTER — ANESTHESIA EVENT (OUTPATIENT)
Dept: GASTROENTEROLOGY | Facility: MEDICAL CENTER | Age: 63
End: 2023-11-22

## 2023-11-22 ENCOUNTER — ANESTHESIA (OUTPATIENT)
Dept: GASTROENTEROLOGY | Facility: MEDICAL CENTER | Age: 63
End: 2023-11-22

## 2023-11-22 ENCOUNTER — HOSPITAL ENCOUNTER (OUTPATIENT)
Dept: GASTROENTEROLOGY | Facility: MEDICAL CENTER | Age: 63
Setting detail: OUTPATIENT SURGERY
Discharge: HOME | End: 2023-11-22
Admitting: STUDENT IN AN ORGANIZED HEALTH CARE EDUCATION/TRAINING PROGRAM
Payer: MEDICARE

## 2023-11-22 VITALS
WEIGHT: 144 LBS | BODY MASS INDEX: 25.52 KG/M2 | RESPIRATION RATE: 16 BRPM | SYSTOLIC BLOOD PRESSURE: 136 MMHG | TEMPERATURE: 98.2 F | HEART RATE: 82 BPM | DIASTOLIC BLOOD PRESSURE: 79 MMHG | HEIGHT: 63 IN | OXYGEN SATURATION: 95 %

## 2023-11-22 DIAGNOSIS — K21.9 GERD WITHOUT ESOPHAGITIS: ICD-10-CM

## 2023-11-22 DIAGNOSIS — R19.7 DIARRHEA, UNSPECIFIED TYPE: ICD-10-CM

## 2023-11-22 DIAGNOSIS — R11.0 NAUSEA: ICD-10-CM

## 2023-11-22 DIAGNOSIS — D50.9 IRON DEFICIENCY ANEMIA, UNSPECIFIED IRON DEFICIENCY ANEMIA TYPE: ICD-10-CM

## 2023-11-22 PROCEDURE — 43239 EGD BIOPSY SINGLE/MULTIPLE: CPT | Performed by: STUDENT IN AN ORGANIZED HEALTH CARE EDUCATION/TRAINING PROGRAM

## 2023-11-22 PROCEDURE — 45385 COLONOSCOPY W/LESION REMOVAL: CPT | Performed by: STUDENT IN AN ORGANIZED HEALTH CARE EDUCATION/TRAINING PROGRAM

## 2023-11-22 PROCEDURE — 88342 IMHCHEM/IMCYTCHM 1ST ANTB: CPT | Performed by: STUDENT IN AN ORGANIZED HEALTH CARE EDUCATION/TRAINING PROGRAM

## 2023-11-22 PROCEDURE — 88305 TISSUE EXAM BY PATHOLOGIST: CPT | Performed by: STUDENT IN AN ORGANIZED HEALTH CARE EDUCATION/TRAINING PROGRAM

## 2023-11-22 PROCEDURE — 45380 COLONOSCOPY AND BIOPSY: CPT | Performed by: STUDENT IN AN ORGANIZED HEALTH CARE EDUCATION/TRAINING PROGRAM

## 2023-11-22 RX ORDER — PANTOPRAZOLE SODIUM 40 MG/1
40 TABLET, DELAYED RELEASE ORAL DAILY
Qty: 30 TABLET | Refills: 11 | Status: SHIPPED | OUTPATIENT
Start: 2023-11-22

## 2023-11-22 RX ORDER — PROPOFOL 10 MG/ML
INJECTION, EMULSION INTRAVENOUS AS NEEDED
Status: DISCONTINUED | OUTPATIENT
Start: 2023-11-22 | End: 2023-11-22

## 2023-11-22 RX ORDER — LIDOCAINE HYDROCHLORIDE 20 MG/ML
INJECTION, SOLUTION EPIDURAL; INFILTRATION; INTRACAUDAL; PERINEURAL AS NEEDED
Status: DISCONTINUED | OUTPATIENT
Start: 2023-11-22 | End: 2023-11-22

## 2023-11-22 RX ORDER — SODIUM CHLORIDE 9 MG/ML
125 INJECTION, SOLUTION INTRAVENOUS CONTINUOUS
Status: DISCONTINUED | OUTPATIENT
Start: 2023-11-22 | End: 2023-11-26 | Stop reason: HOSPADM

## 2023-11-22 RX ADMIN — PROPOFOL 50 MG: 10 INJECTION, EMULSION INTRAVENOUS at 11:25

## 2023-11-22 RX ADMIN — PROPOFOL 130 MG: 10 INJECTION, EMULSION INTRAVENOUS at 11:18

## 2023-11-22 RX ADMIN — LIDOCAINE HYDROCHLORIDE 5 ML: 20 INJECTION, SOLUTION EPIDURAL; INFILTRATION; INTRACAUDAL at 11:18

## 2023-11-22 RX ADMIN — SODIUM CHLORIDE 125 ML/HR: 0.9 INJECTION, SOLUTION INTRAVENOUS at 10:57

## 2023-11-22 RX ADMIN — Medication 40 MG: at 11:34

## 2023-11-22 RX ADMIN — PROPOFOL 50 MG: 10 INJECTION, EMULSION INTRAVENOUS at 11:21

## 2023-11-22 NOTE — ANESTHESIA POSTPROCEDURE EVALUATION
Post-Op Assessment Note    CV Status:  Stable    Pain management: adequate       Mental Status:  Alert and awake   Hydration Status:  Euvolemic   PONV Controlled:  Controlled   Airway Patency:  Patent     Post Op Vitals Reviewed: Yes    No anethesia notable event occurred.                 BP      Temp      Pulse     Resp      SpO2      /79   Pulse 82   Temp 98.2 °F (36.8 °C)   Resp 16   Ht 5' 2.5" (1.588 m)   Wt 65.3 kg (144 lb)   SpO2 95%   BMI 25.92 kg/m²

## 2023-11-22 NOTE — H&P
History and Physical -  [facility]  Devika Dobbins 60 y.o. female MRN: [id number]                  HPI: Devika Dobbins is a 60y.o. year old female who presents for GERD, nausea, MARY, diarrhea. REVIEW OF SYSTEMS: Per the HPI, and otherwise unremarkable.     Historical Information   Past Medical History:   Diagnosis Date    Bipolar affective disorder, depressed ([address])     Colon polyp     GERD (gastroesophageal reflux disease)     Heart murmur     Osteoarthritis     Psychiatric disorder     Subdural hematoma (HCC)     Vitamin B12 deficiency      Past Surgical History:   Procedure Laterality Date    BREAST LUMPECTOMY Right     benign    CERVICAL SPINE SURGERY  2002    CHOLECYSTECTOMY  05/1985    FOOT FRACTURE SURGERY Right 2012    outcome - good    FOOT SURGERY Left     GASTRIC BYPASS  2004    x2- 2nd bypass 10/12    HYSTERECTOMY      age 54    KNEE SURGERY Right 01/30/2020     Social History   Social History     Substance and Sexual Activity   Alcohol Use Not Currently     Social History     Substance and Sexual Activity   Drug Use No     Social History     Tobacco Use   Smoking Status Never   Smokeless Tobacco Never   Tobacco Comments    passive smoke exposure     Family History   Problem Relation Age of Onset    Diabetes Father     Heart disease Father     Coronary artery disease Family     Heart attack Family     No Known Problems Mother     Breast cancer Maternal Grandmother     Breast cancer Maternal Grandfather     Breast cancer Paternal Grandmother     Breast cancer Paternal Grandfather     Breast cancer Half-Sister 77    Breast cancer Paternal Aunt     Breast cancer Paternal Aunt     Colon cancer Maternal Uncle     No Known Problems Son        Meds/Allergies       Current Outpatient Medications:     omeprazole (PriLOSEC) 40 MG capsule    tiZANidine (ZANAFLEX) 4 mg tablet    acetaminophen (TYLENOL) 650 mg CR tablet    ARIPiprazole (ABILIFY) 10 mg tablet    buprenorphine (BUTRANS) 15 mcg/hr    buPROPion (WELLBUTRIN) 100 mg tablet    busPIRone (BUSPAR) 15 mg tablet    clotrimazole-betamethasone (LOTRISONE) 1-0.05 % cream    DULoxetine (CYMBALTA) 30 mg delayed release capsule    DULoxetine (CYMBALTA) 60 mg delayed release capsule    gabapentin (NEURONTIN) 600 MG tablet    gabapentin (NEURONTIN) 800 mg tablet    hydrOXYzine HCL (ATARAX) 50 mg tablet    levocetirizine (XYZAL) 5 MG tablet    LORazepam (ATIVAN) 0.5 mg tablet    nystatin (MYCOSTATIN) cream    ondansetron (ZOFRAN) 4 mg tablet    oxyCODONE-acetaminophen (PERCOCET) 5-325 mg per tablet    phentermine 37.5 MG capsule    polyethylene glycol (COLYTE) 4000 mL solution    QUEtiapine (SEROquel) 400 MG tablet    Rexulti 3 MG tablet    traMADol (ULTRAM) 50 mg tablet    traZODone (DESYREL) 100 mg tablet    Trintellix 10 MG tablet    Trintellix 5 MG tablet    Current Facility-Administered Medications:     cyanocobalamin injection 1,000 mcg, 1,000 mcg, Intramuscular, Q30 Days, 1,000 mcg at 04/15/21 1415    cyanocobalamin injection 1,000 mcg, 1,000 mcg, Intramuscular, Q30 Days, 1,000 mcg at 05/18/21 1408    cyanocobalamin injection 1,000 mcg, 1,000 mcg, Intramuscular, Q30 Days, 1,000 mcg at 06/18/21 1325    cyanocobalamin injection 1,000 mcg, 1,000 mcg, Intramuscular, Q30 Days, 1,000 mcg at 08/31/21 1045    cyanocobalamin injection 1,000 mcg, 1,000 mcg, Intramuscular, Q30 Days, 1,000 mcg at 09/24/21 1157    cyanocobalamin injection 1,000 mcg, 1,000 mcg, Intramuscular, Q30 Days, 1,000 mcg at 10/27/21 1118    cyanocobalamin injection 1,000 mcg, 1,000 mcg, Intramuscular, Q30 Days, 1,000 mcg at 01/06/22 1359    cyanocobalamin injection 1,000 mcg, 1,000 mcg, Intramuscular, Q30 Days, 1,000 mcg at 02/03/22 1316    cyanocobalamin injection 1,000 mcg, 1,000 mcg, Intramuscular, Q30 Days, 1,000 mcg at 03/14/22 1323    cyanocobalamin injection 1,000 mcg, 1,000 mcg, Intramuscular, Q30 Days, 1,000 mcg at 04/19/22 1308    cyanocobalamin injection 1,000 mcg, 1,000 mcg, Intramuscular, Q30 Days, 1,000 mcg at 05/18/22 1508    cyanocobalamin injection 1,000 mcg, 1,000 mcg, Intramuscular, Q30 Days, 1,000 mcg at 06/20/22 1303    cyanocobalamin injection 1,000 mcg, 1,000 mcg, Intramuscular, Q30 Days, 1,000 mcg at 07/22/22 1314    cyanocobalamin injection 1,000 mcg, 1,000 mcg, Intramuscular, Q30 Days, 1,000 mcg at 08/22/22 1311    cyanocobalamin injection 1,000 mcg, 1,000 mcg, Intramuscular, Q30 Days, 1,000 mcg at 09/19/22 1324    cyanocobalamin injection 1,000 mcg, 1,000 mcg, Intramuscular, Q30 Days, 1,000 mcg at 10/24/22 1325    cyanocobalamin injection 1,000 mcg, 1,000 mcg, Intramuscular, Q30 Days, 1,000 mcg at 11/28/22 1442    cyanocobalamin injection 1,000 mcg, 1,000 mcg, Intramuscular, Q30 Days, 1,000 mcg at 12/28/22 1404    cyanocobalamin injection 1,000 mcg, 1,000 mcg, Intramuscular, Q30 Days, 1,000 mcg at 01/30/23 1453    cyanocobalamin injection 1,000 mcg, 1,000 mcg, Intramuscular, Q30 Days, 1,000 mcg at 03/03/23 1333    cyanocobalamin injection 1,000 mcg, 1,000 mcg, Intramuscular, Q30 Days, 1,000 mcg at 04/11/23 1314    cyanocobalamin injection 1,000 mcg, 1,000 mcg, Intramuscular, Q30 Days, 1,000 mcg at 05/17/23 1321    cyanocobalamin injection 1,000 mcg, 1,000 mcg, Intramuscular, Q30 Days, 1,000 mcg at 06/19/23 1305    cyanocobalamin injection 1,000 mcg, 1,000 mcg, Intramuscular, Q30 Days, 1,000 mcg at 07/21/23 1311    cyanocobalamin injection 1,000 mcg, 1,000 mcg, Intramuscular, Q30 Days, 1,000 mcg at 08/24/23 1115    cyanocobalamin injection 1,000 mcg, 1,000 mcg, Intramuscular, Q30 Days, 1,000 mcg at 09/25/23 1249    cyanocobalamin injection 1,000 mcg, 1,000 mcg, Intramuscular, Q30 Days, 1,000 mcg at 10/24/23 1208    sodium chloride 0.9 % infusion, 125 mL/hr, Intravenous, Continuous    Allergies   Allergen Reactions    Prochlorperazine Other (See Comments)     seizure,swollen tongue    Rofecoxib Other (See Comments)     internal bleeding    Erythromycin Base Other (See Comments)     seizure       Objective     There were no vitals taken for this visit. PHYSICAL EXAM    Gen: NAD  Head: NCAT  CV: RRR  CHEST: Clear  ABD: soft, NT/ND  EXT: no edema      ASSESSMENT/PLAN:  This is a 60y.o. year old female here for EGD and colonoscopy  , and she is stable and optimized for her procedure.

## 2023-11-22 NOTE — ANESTHESIA PREPROCEDURE EVALUATION
Procedure:  EGD  COLONOSCOPY    Relevant Problems   CARDIO   (+) Mixed hyperlipidemia      GI/HEPATIC   (+) GERD without esophagitis      HEMATOLOGY   (+) Anemia   (+) Iron deficiency anemia secondary to inadequate dietary iron intake      MUSCULOSKELETAL   (+) Cervical spondylosis   (+) Chronic bilateral low back pain with sciatica      NEURO/PSYCH   (+) Chronic bilateral low back pain with sciatica   (+) Numbness and tingling of both feet      Other   (+) Bipolar disorder, current episode mixed, mild (HCC)   (+) History of subarachnoid hemorrhage   (+) Snoring        Physical Exam    Airway    Mallampati score: II         Dental   No notable dental hx     Cardiovascular  Cardiovascular exam normal    Pulmonary  Pulmonary exam normal     Other Findings  post-pubertal.      Anesthesia Plan  ASA Score- 3     Anesthesia Type- IV sedation with anesthesia with ASA Monitors. Additional Monitors:     Airway Plan:            Plan Factors-Exercise tolerance (METS): >4 METS. Chart reviewed. Patient is not a current smoker. Patient instructed to abstain from smoking on day of procedure. Patient did not smoke on day of surgery. Obstructive sleep apnea risk education given perioperatively. Induction- intravenous. Postoperative Plan-     Informed Consent- Anesthetic plan and risks discussed with patient.

## 2023-11-28 ENCOUNTER — OFFICE VISIT (OUTPATIENT)
Dept: FAMILY MEDICINE CLINIC | Facility: CLINIC | Age: 63
End: 2023-11-28
Payer: MEDICARE

## 2023-11-28 VITALS
DIASTOLIC BLOOD PRESSURE: 70 MMHG | HEIGHT: 63 IN | OXYGEN SATURATION: 98 % | TEMPERATURE: 97.8 F | WEIGHT: 149 LBS | SYSTOLIC BLOOD PRESSURE: 114 MMHG | BODY MASS INDEX: 26.4 KG/M2 | RESPIRATION RATE: 16 BRPM | HEART RATE: 90 BPM

## 2023-11-28 DIAGNOSIS — E53.8 VITAMIN B 12 DEFICIENCY: ICD-10-CM

## 2023-11-28 DIAGNOSIS — E55.9 VITAMIN D INSUFFICIENCY: ICD-10-CM

## 2023-11-28 DIAGNOSIS — E78.2 MIXED HYPERLIPIDEMIA: Primary | ICD-10-CM

## 2023-11-28 DIAGNOSIS — D64.9 ANEMIA, UNSPECIFIED TYPE: ICD-10-CM

## 2023-11-28 DIAGNOSIS — K21.9 GERD WITHOUT ESOPHAGITIS: ICD-10-CM

## 2023-11-28 DIAGNOSIS — Z23 ENCOUNTER FOR IMMUNIZATION: ICD-10-CM

## 2023-11-28 DIAGNOSIS — J30.1 SEASONAL ALLERGIC RHINITIS DUE TO POLLEN: ICD-10-CM

## 2023-11-28 DIAGNOSIS — Z00.00 HEALTHCARE MAINTENANCE: ICD-10-CM

## 2023-11-28 DIAGNOSIS — B00.1 COLD SORE: ICD-10-CM

## 2023-11-28 PROCEDURE — G0008 ADMIN INFLUENZA VIRUS VAC: HCPCS

## 2023-11-28 PROCEDURE — 99214 OFFICE O/P EST MOD 30 MIN: CPT | Performed by: NURSE PRACTITIONER

## 2023-11-28 PROCEDURE — G0444 DEPRESSION SCREEN ANNUAL: HCPCS | Performed by: NURSE PRACTITIONER

## 2023-11-28 PROCEDURE — G0439 PPPS, SUBSEQ VISIT: HCPCS | Performed by: NURSE PRACTITIONER

## 2023-11-28 PROCEDURE — 90686 IIV4 VACC NO PRSV 0.5 ML IM: CPT

## 2023-11-28 RX ORDER — CYANOCOBALAMIN 1000 UG/ML
1000 INJECTION, SOLUTION INTRAMUSCULAR; SUBCUTANEOUS
Status: SHIPPED | OUTPATIENT
Start: 2023-11-28

## 2023-11-28 RX ORDER — ESOMEPRAZOLE MAGNESIUM 40 MG/1
40 CAPSULE, DELAYED RELEASE ORAL
COMMUNITY

## 2023-11-28 RX ORDER — VALACYCLOVIR HYDROCHLORIDE 1 G/1
2000 TABLET, FILM COATED ORAL 2 TIMES DAILY
Qty: 4 TABLET | Refills: 0 | Status: SHIPPED | OUTPATIENT
Start: 2023-11-28 | End: 2023-11-29

## 2023-11-28 RX ORDER — LEVOMILNACIPRAN HYDROCHLORIDE 40 MG/1
40 CAPSULE, EXTENDED RELEASE ORAL DAILY
COMMUNITY
Start: 2023-10-30

## 2023-11-28 RX ORDER — TOPIRAMATE 100 MG/1
100 TABLET, FILM COATED ORAL 2 TIMES DAILY
COMMUNITY
Start: 2023-10-03

## 2023-11-28 RX ADMIN — CYANOCOBALAMIN 1000 MCG: 1000 INJECTION, SOLUTION INTRAMUSCULAR; SUBCUTANEOUS at 13:27

## 2023-11-28 NOTE — ASSESSMENT & PLAN NOTE
- Reviewed immunizations and screenings. - UTD with dental, vision, and GYN exams.   - Mammogram scheduled for 12/12.   - UTD with colonoscopy. - Flu vaccine given in office. Recommended RSV vaccine.

## 2023-11-28 NOTE — PROGRESS NOTES
Assessment and Plan:     Problem List Items Addressed This Visit     GERD without esophagitis     - Stable on pantoprazole 40 mg daily. Continue same.   - Avoid triggering food and beverage.  - Continue routine follow up with GI. Relevant Medications    esomeprazole (NexIUM) 40 MG capsule    Seasonal allergic rhinitis due to pollen     - Well controlled with use of Xyzal. Continue same.   - Will continue to monitor. Mixed hyperlipidemia - Primary     - Well controlled. - Encourage healthy diet and regular exercise. - Will continue to monitor fasting lipid panel. Relevant Orders    Lipid Panel with Direct LDL reflex    Vitamin B 12 deficiency     - Received B12 injection in office today. - Will continue to monitor B12 level. Relevant Medications    cyanocobalamin injection 1,000 mcg    cyanocobalamin injection 1,000 mcg    Other Relevant Orders    Vitamin B12    Anemia     - Will obtain updated CBC and iron panel. Relevant Medications    cyanocobalamin injection 1,000 mcg    cyanocobalamin injection 1,000 mcg    Other Relevant Orders    Iron Panel (Includes Ferritin, Iron Sat%, Iron, and TIBC)    Vitamin D insufficiency    Relevant Orders    Vitamin D 25 hydroxy    Cold sore    Relevant Medications    valACYclovir (VALTREX) 1,000 mg tablet    Healthcare maintenance     - Reviewed immunizations and screenings. - UTD with dental, vision, and GYN exams.   - Mammogram scheduled for 12/12.   - UTD with colonoscopy. - Flu vaccine given in office. Recommended RSV vaccine. Relevant Orders    CBC and differential    Comprehensive metabolic panel    Lipid Panel with Direct LDL reflex    TSH, 3rd generation with Free T4 reflex     BMI Counseling: Body mass index is 26.82 kg/m².  The BMI is above normal. Nutrition recommendations include decreasing portion sizes, encouraging healthy choices of fruits and vegetables, decreasing fast food intake and reducing intake of cholesterol. Exercise recommendations include moderate physical activity 150 minutes/week and exercising 3-5 times per week. Rationale for BMI follow-up plan is due to patient being overweight or obese. Preventive health issues were discussed with patient, and age appropriate screening tests were ordered as noted in patient's After Visit Summary. Personalized health advice and appropriate referrals for health education or preventive services given if needed, as noted in patient's After Visit Summary. History of Present Illness:     Patient presents for a Medicare Wellness Visit    Patient with PMH of GERD, anemia, HLD, B12 deficiency, and bipolar disorder presents to office today for annual physical. She is taking her prescribed medication and reports no side effects. She did not get her blood work done yet. Has complaints today of a cold sore on her bottom lip. She denies any other concerns or complaints today. Patient Care Team:  Gabriella Greenwood as PCP - General (Nurse Practitioner)     Review of Systems:     Review of Systems   Constitutional:  Negative for fatigue and fever. HENT:  Positive for mouth sores. Negative for congestion and trouble swallowing. Eyes:  Negative for visual disturbance. Respiratory:  Negative for cough and shortness of breath. Cardiovascular:  Negative for chest pain and palpitations. Gastrointestinal:  Negative for abdominal pain and blood in stool. Endocrine: Negative for cold intolerance and heat intolerance. Genitourinary:  Negative for difficulty urinating, dysuria and frequency. Musculoskeletal:  Negative for gait problem. Skin:  Negative for rash. Neurological:  Negative for dizziness, syncope and headaches. Hematological:  Negative for adenopathy. Psychiatric/Behavioral:  Negative for behavioral problems.          Problem List:     Patient Active Problem List   Diagnosis   • Arthropathy   • Neck pain   • GERD without esophagitis   • Insomnia   • Obesity (BMI 35.0-39.9 without comorbidity)   • Cervical stenosis of spinal canal   • Vitamin B-complex deficiency   • Dermatitis   • Pre-op examination   • Chronic pain of left knee   • Postgastrectomy malabsorption   • Iron deficiency anemia secondary to inadequate dietary iron intake   • Screening for breast cancer   • Screening for colon cancer   • Yeast infection of the skin   • Stress incontinence   • Bipolar disorder, current episode mixed, mild (HCC)   • History of subarachnoid hemorrhage   • Cervical spondylosis   • Seasonal allergic rhinitis due to pollen   • Obesity due to excess calories with serious comorbidity   • Medicare annual wellness visit, subsequent   • Mixed hyperlipidemia   • History of colon polyps   • Breast cancer screening   • Chronic bilateral low back pain with sciatica   • Other fatigue   • Snoring   • Left leg pain   • History of total knee replacement, left   • Left thigh pain   • Right foot pain   • Pigmented skin lesions   • Splinter in skin   • Vitamin B 12 deficiency   • Balance problem   • Numbness and tingling of both feet   • Gait disorder   • Urinary incontinence   • Influenza vaccine administered   • Alternating constipation and diarrhea   • Anemia   • Vitamin D insufficiency   • Nausea   • Bipolar 2 disorder (HCC)   • Cold sore   • Healthcare maintenance      Past Medical and Surgical History:     Past Medical History:   Diagnosis Date   • Bipolar affective disorder, depressed ([address])    • Colon polyp    • GERD (gastroesophageal reflux disease)    • Heart murmur    • Osteoarthritis    • Psychiatric disorder    • Subdural hematoma (HCC)    • Vitamin B12 deficiency      Past Surgical History:   Procedure Laterality Date   • BREAST LUMPECTOMY Right     benign   • CERVICAL SPINE SURGERY  2002   • CHOLECYSTECTOMY  05/1985   • FOOT FRACTURE SURGERY Right 2012    outcome - good   • FOOT SURGERY Left    • GASTRIC BYPASS  2004    x2- 2nd bypass 10/12   • HYSTERECTOMY      age 54   • KNEE SURGERY Right 01/30/2020      Family History:     Family History   Problem Relation Age of Onset   • Diabetes Father    • Heart disease Father    • Coronary artery disease Family    • Heart attack Family    • No Known Problems Mother    • Breast cancer Maternal Grandmother    • Breast cancer Maternal Grandfather    • Breast cancer Paternal Grandmother    • Breast cancer Paternal Grandfather    • Breast cancer Half-Sister 77   • Breast cancer Paternal Aunt    • Breast cancer Paternal Aunt    • Colon cancer Maternal Uncle    • No Known Problems Son       Social History:     Social History     Socioeconomic History   • Marital status:      Spouse name: None   • Number of children: None   • Years of education: None   • Highest education level: None   Occupational History   • None   Tobacco Use   • Smoking status: Never   • Smokeless tobacco: Never   • Tobacco comments:     passive smoke exposure   Vaping Use   • Vaping Use: Never used   Substance and Sexual Activity   • Alcohol use: Not Currently   • Drug use: No   • Sexual activity: None   Other Topics Concern   • None   Social History Narrative   • None     Social Determinants of Health     Financial Resource Strain: Not on file   Food Insecurity: Not on file   Transportation Needs: Not on file   Physical Activity: Not on file   Stress: Not on file   Social Connections: Not on file   Intimate Partner Violence: Not on file   Housing Stability: Not on file      Medications and Allergies:     Current Outpatient Medications   Medication Sig Dispense Refill   • buPROPion (WELLBUTRIN) 100 mg tablet Take 200 mg by mouth 2 (two) times a day     • clotrimazole-betamethasone (LOTRISONE) 1-0.05 % cream Apply topically 2 (two) times a day 30 g 1   • esomeprazole (NexIUM) 40 MG capsule Take 40 mg by mouth every morning before breakfast     • Fetzima 40 MG extended release capsule Take 40 mg by mouth daily     • levocetirizine (XYZAL) 5 MG tablet take 1 tablet by mouth every evening 90 tablet 0   • pantoprazole (PROTONIX) 40 mg tablet Take 1 tablet (40 mg total) by mouth daily 30 tablet 11   • phentermine 37.5 MG capsule Take 37.5 mg by mouth every morning     • QUEtiapine (SEROquel) 400 MG tablet Take 800 mg by mouth daily at bedtime  0   • tiZANidine (ZANAFLEX) 4 mg tablet Take 4 mg by mouth 3 (three) times a day  0   • topiramate (TOPAMAX) 100 mg tablet Take 100 mg by mouth 2 (two) times a day     • traMADol (ULTRAM) 50 mg tablet Take 1 tablet daily as needed 30 tablet 0   • valACYclovir (VALTREX) 1,000 mg tablet Take 2 tablets (2,000 mg total) by mouth 2 (two) times a day for 1 day 4 tablet 0   • acetaminophen (TYLENOL) 650 mg CR tablet 1 as directed for pain (Patient not taking: Reported on 10/10/2023)     • ARIPiprazole (ABILIFY) 10 mg tablet  (Patient not taking: Reported on 10/10/2023)     • buprenorphine (BUTRANS) 15 mcg/hr Place 1 patch on the skin once a week (Patient not taking: Reported on 5/17/2023)     • busPIRone (BUSPAR) 15 mg tablet Take 15 mg by mouth 2 (two) times a day (Patient not taking: Reported on 11/28/2023)     • DULoxetine (CYMBALTA) 30 mg delayed release capsule  (Patient not taking: Reported on 11/28/2023)     • DULoxetine (CYMBALTA) 60 mg delayed release capsule Take 120 mg by mouth daily (Patient not taking: Reported on 11/28/2023)     • gabapentin (NEURONTIN) 600 MG tablet Take 1,200 mg by mouth daily at bedtime (Patient not taking: Reported on 5/17/2023)     • gabapentin (NEURONTIN) 800 mg tablet Take 1,600 mg by mouth daily at bedtime  (Patient not taking: Reported on 3/14/2022)  0   • hydrOXYzine HCL (ATARAX) 50 mg tablet Take 50 mg by mouth daily at bedtime   (Patient not taking: Reported on 11/28/2023)  0   • LORazepam (ATIVAN) 0.5 mg tablet Take 0.5 mg by mouth daily as needed (Patient not taking: Reported on 11/28/2023)     • nystatin (MYCOSTATIN) cream Apply topically 2 (two) times a day (Patient not taking: Reported on 11/28/2023) 30 g 2   • ondansetron (ZOFRAN) 4 mg tablet Take 1 tablet (4 mg total) by mouth every 8 (eight) hours as needed for nausea or vomiting (Patient not taking: Reported on 11/28/2023) 20 tablet 0   • oxyCODONE-acetaminophen (PERCOCET) 5-325 mg per tablet Take 1 tablet by mouth every 6 (six) hours as needed   (Patient not taking: Reported on 10/10/2023)     • Rexulti 3 MG tablet Take 3 mg by mouth daily (Patient not taking: Reported on 10/10/2023)     • traZODone (DESYREL) 100 mg tablet Take 100 mg by mouth daily at bedtime (Patient not taking: Reported on 11/28/2023)     • Trintellix 10 MG tablet take 1 tablet by mouth once daily IN ADDITION TO 5 MG DAILY DOSING (Patient not taking: Reported on 5/17/2023)     • Trintellix 5 MG tablet take 1 tablet by mouth once daily IN ADDITION TO 10 MG ONCE DAILY DOSING (Patient not taking: Reported on 11/15/2022)       Current Facility-Administered Medications   Medication Dose Route Frequency Provider Last Rate Last Admin   • cyanocobalamin injection 1,000 mcg  1,000 mcg Intramuscular Q30 Days Franci Vergara PA-C   1,000 mcg at 04/15/21 1415   • cyanocobalamin injection 1,000 mcg  1,000 mcg Intramuscular Q30 Days Franci Vergara PA-C   1,000 mcg at 05/18/21 1408   • cyanocobalamin injection 1,000 mcg  1,000 mcg Intramuscular Q30 Days WHITNEY Douglass   1,000 mcg at 06/18/21 1325   • cyanocobalamin injection 1,000 mcg  1,000 mcg Intramuscular Q30 Days WHITNEY Douglass   1,000 mcg at 08/31/21 1045   • cyanocobalamin injection 1,000 mcg  1,000 mcg Intramuscular Q30 Days WHITNEY Perez   1,000 mcg at 09/24/21 1157   • cyanocobalamin injection 1,000 mcg  1,000 mcg Intramuscular Q30 Days WHITNEY Perez   1,000 mcg at 10/27/21 1118   • cyanocobalamin injection 1,000 mcg  1,000 mcg Intramuscular Q30 Days Kevin Whitehead MD   1,000 mcg at 01/06/22 1359   • cyanocobalamin injection 1,000 mcg  1,000 mcg Intramuscular Q30 Days Wasm Tra Walters MD   1,000 mcg at 02/03/22 1316   • cyanocobalamin injection 1,000 mcg  1,000 mcg Intramuscular Q30 Days Mushtaq Marcos MD   1,000 mcg at 03/14/22 1323   • cyanocobalamin injection 1,000 mcg  1,000 mcg Intramuscular Q30 Days Nicola Moran CRNP   1,000 mcg at 04/19/22 1308   • cyanocobalamin injection 1,000 mcg  1,000 mcg Intramuscular Q30 Days Nicola Moran CRNP   1,000 mcg at 05/18/22 1508   • cyanocobalamin injection 1,000 mcg  1,000 mcg Intramuscular Q30 Days Nicola Moran CRNP   1,000 mcg at 06/20/22 1303   • cyanocobalamin injection 1,000 mcg  1,000 mcg Intramuscular Q30 Days Mushtaq Marcos MD   1,000 mcg at 07/22/22 1314   • cyanocobalamin injection 1,000 mcg  1,000 mcg Intramuscular Q30 Days Mushtaq Marcos MD   1,000 mcg at 08/22/22 1311   • cyanocobalamin injection 1,000 mcg  1,000 mcg Intramuscular Q30 Days Mushtaq Marcos MD   1,000 mcg at 09/19/22 1324   • cyanocobalamin injection 1,000 mcg  1,000 mcg Intramuscular Q30 Days Mushtaq Marcos MD   1,000 mcg at 10/24/22 1325   • cyanocobalamin injection 1,000 mcg  1,000 mcg Intramuscular Q30 Days Mushtaq Marcos MD   1,000 mcg at 11/28/22 1442   • cyanocobalamin injection 1,000 mcg  1,000 mcg Intramuscular Q30 Days Nicola Moran CRNP   1,000 mcg at 12/28/22 1404   • cyanocobalamin injection 1,000 mcg  1,000 mcg Intramuscular Q30 Days Nicola Moran CRNP   1,000 mcg at 01/30/23 1453   • cyanocobalamin injection 1,000 mcg  1,000 mcg Intramuscular Q30 Days Nicola Moran CRNP   1,000 mcg at 03/03/23 1333   • cyanocobalamin injection 1,000 mcg  1,000 mcg Intramuscular Q30 Days Nicola Moran CRNP   1,000 mcg at 04/11/23 1314   • cyanocobalamin injection 1,000 mcg  1,000 mcg Intramuscular Q30 Days WHITNEY Villar   1,000 mcg at 05/17/23 1321   • cyanocobalamin injection 1,000 mcg  1,000 mcg Intramuscular Q30 Days WHITNEY Villar   1,000 mcg at 06/19/23 1305   • cyanocobalamin injection 1,000 mcg 1,000 mcg Intramuscular Q30 Days Antio[place] Mend, CRNP   1,000 mcg at 07/21/23 1311   • cyanocobalamin injection 1,000 mcg  1,000 mcg Intramuscular Q30 Days Antio[place] Mend, CRNP   1,000 mcg at 08/24/23 1115   • cyanocobalamin injection 1,000 mcg  1,000 mcg Intramuscular Q30 Days Antio[place] Mend, CRNP   1,000 mcg at 09/25/23 1249   • cyanocobalamin injection 1,000 mcg  1,000 mcg Intramuscular Q30 Days Antio[place] Mend, CRNP   1,000 mcg at 10/24/23 1208   • cyanocobalamin injection 1,000 mcg  1,000 mcg Intramuscular Q30 Days WHITNEY Restrepo       • cyanocobalamin injection 1,000 mcg  1,000 mcg Intramuscular Q30 Days Antio[place] Mend, CRNP   1,000 mcg at 11/28/23 1327     Allergies   Allergen Reactions   • Prochlorperazine Other (See Comments)     seizure,swollen tongue   • Rofecoxib Other (See Comments)     internal bleeding   • Erythromycin Base Other (See Comments)     seizure      Immunizations:     Immunization History   Administered Date(s) Administered   • COVID-19 PFIZER VACCINE 0.3 ML IM 03/29/2021, 04/19/2021, 11/22/2021   • INFLUENZA 09/20/2016, 09/20/2016, 11/16/2017, 11/16/2017, 11/15/2022, 10/24/2023   • Influenza Split 09/17/2013   • Influenza, injectable, quadrivalent, preservative free 0.5 mL 09/20/2018, 10/24/2023   • Influenza, recombinant, quadrivalent,injectable, preservative free 11/21/2019, 10/20/2020, 01/06/2022, 11/15/2022   • Influenza, seasonal, injectable 07/02/2012   • Tdap 10/22/2003, 06/25/2017   • Zoster Vaccine Recombinant 07/09/2023, 10/03/2023      Health Maintenance:         Topic Date Due   • HIV Screening  Never done   • Breast Cancer Screening: Mammogram  10/22/2022   • Colorectal Cancer Screening  05/20/2024   • Hepatitis C Screening  Completed         Topic Date Due   • COVID-19 Vaccine (4 - Pfizer series) 01/17/2022      Medicare Screening Tests and Risk Assessments:     Lynda Lopez is here for her Subsequent Wellness visit.  Last Medicare Wellness visit information reviewed, patient interviewed and updates made to the record today. Health Risk Assessment:   Patient rates overall health as fair. Patient feels that their physical health rating is same. Patient is satisfied with their life. Eyesight was rated as same. Hearing was rated as same. Patient feels that their emotional and mental health rating is same. Patients states they are never, rarely angry. Patient states they are never, rarely unusually tired/fatigued. Pain experienced in the last 7 days has been none. Patient states that she has experienced no weight loss or gain in last 6 months. Depression Screening:   PHQ-2 Score: 0      Fall Risk Screening: In the past year, patient has experienced: history of falling in past year    Number of falls: 2 or more  Injured during fall?: No    Feels unsteady when standing or walking?: Yes    Worried about falling?: Yes      Urinary Incontinence Screening:   Patient has not leaked urine accidently in the last six months. Home Safety:  Patient has trouble with stairs inside or outside of their home. Patient has working smoke alarms and has working carbon monoxide detector. Home safety hazards include: none. Nutrition:   Current diet is Regular. Medications:   Patient is currently taking over-the-counter supplements. OTC medications include: see medication list. Patient is able to manage medications. Activities of Daily Living (ADLs)/Instrumental Activities of Daily Living (IADLs):   Walk and transfer into and out of bed and chair?: Yes  Dress and groom yourself?: Yes    Bathe or shower yourself?: Yes    Feed yourself?  Yes  Do your laundry/housekeeping?: Yes  Manage your money, pay your bills and track your expenses?: Yes  Make your own meals?: Yes    Do your own shopping?: Yes    Previous Hospitalizations:   Any hospitalizations or ED visits within the last 12 months?: Yes    How many hospitalizations have you had in the last year?: 1-2    Advance Care Planning:   Living will: No    Durable POA for healthcare: No    Advanced directive: No      Cognitive Screening:   Provider or family/friend/caregiver concerned regarding cognition?: No    PREVENTIVE SCREENINGS      Cardiovascular Screening:    General: Screening Not Indicated and History Lipid Disorder      Diabetes Screening:     General: Screening Current      Colorectal Cancer Screening:     General: Screening Current      Breast Cancer Screening:     General: Risks and Benefits Discussed    Due for: Mammogram        Cervical Cancer Screening:    General: Risks and Benefits Discussed and Screening Current      Osteoporosis Screening:    General: Patient Declines      Abdominal Aortic Aneurysm (AAA) Screening:        General: Screening Not Indicated      Lung Cancer Screening:     General: Screening Not Indicated      Hepatitis C Screening:    General: Screening Current    Screening, Brief Intervention, and Referral to Treatment (SBIRT)    Screening  Typical number of drinks in a day: 0  Typical number of drinks in a week: 0  Interpretation: Low risk drinking behavior. Single Item Drug Screening:  How often have you used an illegal drug (including marijuana) or a prescription medication for non-medical reasons in the past year? never    Single Item Drug Screen Score: 0  Interpretation: Negative screen for possible drug use disorder    Annual Depression Screening  Time spent screening and evaluating the patient for depression during today's encounter was 5 minutes. Review of Current Opioid Use    Opioid Risk Tool (ORT) Interpretation: Complete Opioid Risk Tool (ORT)    Other Counseling Topics:   Calcium and vitamin D intake and regular weightbearing exercise. No results found.      Physical Exam:     /70 (BP Location: Left arm, Patient Position: Sitting, Cuff Size: Adult)   Pulse 90   Temp 97.8 °F (36.6 °C) (Tympanic)   Resp 16   Ht 5' 2.5" (1.588 m)   Wt 67.6 kg (149 lb) SpO2 98%   BMI 26.82 kg/m²     Physical Exam  Vitals and nursing note reviewed. Constitutional:       General: She is not in acute distress. Appearance: Normal appearance. She is well-developed. She is not ill-appearing. HENT:      Head: Normocephalic and atraumatic. Right Ear: Tympanic membrane, ear canal and external ear normal.      Left Ear: Tympanic membrane, ear canal and external ear normal.      Nose: Nose normal.      Mouth/Throat:      Mouth: Mucous membranes are moist.      Pharynx: No posterior oropharyngeal erythema. Eyes:      Conjunctiva/sclera: Conjunctivae normal.      Pupils: Pupils are equal, round, and reactive to light. Cardiovascular:      Rate and Rhythm: Normal rate and regular rhythm. Heart sounds: Normal heart sounds. Pulmonary:      Effort: Pulmonary effort is normal.      Breath sounds: Normal breath sounds. Abdominal:      General: Bowel sounds are normal.      Palpations: Abdomen is soft. Musculoskeletal:         General: Normal range of motion. Cervical back: Normal range of motion. Lymphadenopathy:      Cervical: No cervical adenopathy. Skin:     General: Skin is warm and dry. Neurological:      Mental Status: She is alert and oriented to person, place, and time.    Psychiatric:         Mood and Affect: Mood normal.         Behavior: Behavior normal.          WHITNEY Dangelo

## 2023-11-28 NOTE — ASSESSMENT & PLAN NOTE
- Stable on pantoprazole 40 mg daily.  Continue same.   - Avoid triggering food and beverage.  - Continue routine follow up with GI.

## 2023-11-28 NOTE — ASSESSMENT & PLAN NOTE
- Well controlled. - Encourage healthy diet and regular exercise. - Will continue to monitor fasting lipid panel.

## 2023-11-28 NOTE — PROGRESS NOTES
Name: Thanh Carroll      : 1960      MRN: [id number]  Encounter Provider: WHITNEY Casiano  Encounter Date: 2023   Encounter department: [place] [address].  Vitamin B 12 deficiency  -     cyanocobalamin injection 1,000 mcg           Subjective               Hx gerd, ibs, bipolar disorder, allergies, b12 def     Get labs done    Dentist  Eye  Gyn  Mammo - scheduled   Colon utd     Flu shot       Review of Systems    Past Medical History:   Diagnosis Date   • Bipolar affective disorder, depressed ([address])    • Colon polyp    • GERD (gastroesophageal reflux disease)    • Heart murmur    • Osteoarthritis    • Psychiatric disorder    • Subdural hematoma ([address])    • Vitamin B12 deficiency      Past Surgical History:   Procedure Laterality Date   • BREAST LUMPECTOMY Right     benign   • CERVICAL SPINE SURGERY     • CHOLECYSTECTOMY  1985   • FOOT FRACTURE SURGERY Right     outcome - good   • FOOT SURGERY Left    • GASTRIC BYPASS  2004    x2- 2nd bypass 10/12   • HYSTERECTOMY      age 54   • KNEE SURGERY Right 2020     Family History   Problem Relation Age of Onset   • Diabetes Father    • Heart disease Father    • Coronary artery disease Family    • Heart attack Family    • No Known Problems Mother    • Breast cancer Maternal Grandmother    • Breast cancer Maternal Grandfather    • Breast cancer Paternal Grandmother    • Breast cancer Paternal Grandfather    • Breast cancer Half-Sister 77   • Breast cancer Paternal Aunt    • Breast cancer Paternal Aunt    • Colon cancer Maternal Uncle    • No Known Problems Son      Social History     Socioeconomic History   • Marital status:      Spouse name: None   • Number of children: None   • Years of education: None   • Highest education level: None   Occupational History   • None   Tobacco Use   • Smoking status: Never   • Smokeless tobacco: Never   • Tobacco comments:     passive smoke exposure   Vaping Use   • Vaping Use: Never used   Substance and Sexual Activity   • Alcohol use: Not Currently   • Drug use: No   • Sexual activity: None   Other Topics Concern   • None   Social History Narrative   • None     Social Determinants of Health     Financial Resource Strain: Not on file   Food Insecurity: Not on file   Transportation Needs: Not on file   Physical Activity: Not on file   Stress: Not on file   Social Connections: Not on file   Intimate Partner Violence: Not on file   Housing Stability: Not on file     Current Outpatient Medications on File Prior to Visit   Medication Sig   • buPROPion (WELLBUTRIN) 100 mg tablet Take 200 mg by mouth 2 (two) times a day   • clotrimazole-betamethasone (LOTRISONE) 1-0.05 % cream Apply topically 2 (two) times a day   • esomeprazole (NexIUM) 40 MG capsule Take 40 mg by mouth every morning before breakfast   • Fetzima 40 MG extended release capsule Take 40 mg by mouth daily   • levocetirizine (XYZAL) 5 MG tablet take 1 tablet by mouth every evening   • pantoprazole (PROTONIX) 40 mg tablet Take 1 tablet (40 mg total) by mouth daily   • phentermine 37.5 MG capsule Take 37.5 mg by mouth every morning   • QUEtiapine (SEROquel) 400 MG tablet Take 800 mg by mouth daily at bedtime   • tiZANidine (ZANAFLEX) 4 mg tablet Take 4 mg by mouth 3 (three) times a day   • topiramate (TOPAMAX) 100 mg tablet Take 100 mg by mouth 2 (two) times a day   • traMADol (ULTRAM) 50 mg tablet Take 1 tablet daily as needed   • acetaminophen (TYLENOL) 650 mg CR tablet 1 as directed for pain (Patient not taking: Reported on 10/10/2023)   • ARIPiprazole (ABILIFY) 10 mg tablet  (Patient not taking: Reported on 10/10/2023)   • buprenorphine (BUTRANS) 15 mcg/hr Place 1 patch on the skin once a week (Patient not taking: Reported on 5/17/2023)   • busPIRone (BUSPAR) 15 mg tablet Take 15 mg by mouth 2 (two) times a day (Patient not taking: Reported on 11/28/2023)   • DULoxetine (CYMBALTA) 30 mg delayed release capsule  (Patient not taking: Reported on 11/28/2023)   • DULoxetine (CYMBALTA) 60 mg delayed release capsule Take 120 mg by mouth daily (Patient not taking: Reported on 11/28/2023)   • gabapentin (NEURONTIN) 600 MG tablet Take 1,200 mg by mouth daily at bedtime (Patient not taking: Reported on 5/17/2023)   • gabapentin (NEURONTIN) 800 mg tablet Take 1,600 mg by mouth daily at bedtime  (Patient not taking: Reported on 3/14/2022)   • hydrOXYzine HCL (ATARAX) 50 mg tablet Take 50 mg by mouth daily at bedtime   (Patient not taking: Reported on 11/28/2023)   • LORazepam (ATIVAN) 0.5 mg tablet Take 0.5 mg by mouth daily as needed (Patient not taking: Reported on 11/28/2023)   • nystatin (MYCOSTATIN) cream Apply topically 2 (two) times a day (Patient not taking: Reported on 11/28/2023)   • ondansetron (ZOFRAN) 4 mg tablet Take 1 tablet (4 mg total) by mouth every 8 (eight) hours as needed for nausea or vomiting (Patient not taking: Reported on 11/28/2023)   • oxyCODONE-acetaminophen (PERCOCET) 5-325 mg per tablet Take 1 tablet by mouth every 6 (six) hours as needed   (Patient not taking: Reported on 10/10/2023)   • Rexulti 3 MG tablet Take 3 mg by mouth daily (Patient not taking: Reported on 10/10/2023)   • traZODone (DESYREL) 100 mg tablet Take 100 mg by mouth daily at bedtime (Patient not taking: Reported on 11/28/2023)   • Trintellix 10 MG tablet take 1 tablet by mouth once daily IN ADDITION TO 5 MG DAILY DOSING (Patient not taking: Reported on 5/17/2023)   • Trintellix 5 MG tablet take 1 tablet by mouth once daily IN ADDITION TO 10 MG ONCE DAILY DOSING (Patient not taking: Reported on 11/15/2022)     Allergies   Allergen Reactions   • Prochlorperazine Other (See Comments)     seizure,swollen tongue   • Rofecoxib Other (See Comments)     internal bleeding   • Erythromycin Base Other (See Comments)     seizure     Immunization History   Administered Date(s) Administered   • COVID-19 PFIZER VACCINE 0.3 ML IM 03/29/2021, 04/19/2021, 11/22/2021   • INFLUENZA 09/20/2016, 09/20/2016, 11/16/2017, 11/16/2017, 11/15/2022, 10/24/2023   • Influenza Split 09/17/2013   • Influenza, injectable, quadrivalent, preservative free 0.5 mL 09/20/2018, 10/24/2023   • Influenza, recombinant, quadrivalent,injectable, preservative free 11/21/2019, 10/20/2020, 01/06/2022, 11/15/2022   • Influenza, seasonal, injectable 07/02/2012   • Tdap 10/22/2003, 06/25/2017   • Zoster Vaccine Recombinant 07/09/2023, 10/03/2023       Objective     /70 (BP Location: Left arm, Patient Position: Sitting, Cuff Size: Adult)   Pulse 90   Temp 97.8 °F (36.6 °C) (Tympanic)   Resp 16   Ht 5' 2.5" (1.588 m)   Wt 67.6 kg (149 lb)   SpO2 98%   BMI 26.82 kg/m²     Physical Exam  WHITNEY Knowles

## 2023-11-30 PROCEDURE — 88342 IMHCHEM/IMCYTCHM 1ST ANTB: CPT | Performed by: STUDENT IN AN ORGANIZED HEALTH CARE EDUCATION/TRAINING PROGRAM

## 2023-11-30 PROCEDURE — 88305 TISSUE EXAM BY PATHOLOGIST: CPT | Performed by: STUDENT IN AN ORGANIZED HEALTH CARE EDUCATION/TRAINING PROGRAM

## 2023-12-01 ENCOUNTER — VBI (OUTPATIENT)
Dept: ADMINISTRATIVE | Facility: OTHER | Age: 63
End: 2023-12-01

## 2023-12-06 DIAGNOSIS — J30.1 SEASONAL ALLERGIC RHINITIS DUE TO POLLEN: ICD-10-CM

## 2023-12-06 RX ORDER — LEVOCETIRIZINE DIHYDROCHLORIDE 5 MG/1
TABLET, FILM COATED ORAL
Qty: 90 TABLET | Refills: 0 | Status: SHIPPED | OUTPATIENT
Start: 2023-12-06

## 2023-12-07 RX ORDER — LEVOCETIRIZINE DIHYDROCHLORIDE 5 MG/1
5 TABLET, FILM COATED ORAL EVERY EVENING
Qty: 90 TABLET | Refills: 0 | OUTPATIENT
Start: 2023-12-07

## 2023-12-12 ENCOUNTER — HOSPITAL ENCOUNTER (OUTPATIENT)
Dept: RADIOLOGY | Facility: IMAGING CENTER | Age: 63
Discharge: HOME | End: 2023-12-12
Payer: MEDICARE

## 2023-12-12 VITALS — BODY MASS INDEX: 26.41 KG/M2 | WEIGHT: 149.03 LBS | HEIGHT: 63 IN

## 2023-12-12 DIAGNOSIS — Z12.31 ENCOUNTER FOR SCREENING MAMMOGRAM FOR MALIGNANT NEOPLASM OF BREAST: ICD-10-CM

## 2023-12-12 PROCEDURE — 77063 BREAST TOMOSYNTHESIS BI: CPT

## 2023-12-12 PROCEDURE — 77067 SCR MAMMO BI INCL CAD: CPT

## 2023-12-14 ENCOUNTER — TELEPHONE (OUTPATIENT)
Dept: FAMILY MEDICINE CLINIC | Facility: CLINIC | Age: 63
End: 2023-12-14

## 2023-12-14 NOTE — TELEPHONE ENCOUNTER
----- Message from [address] sent at 12/13/2023  3:47 PM EST -----  Mammogram showed no evidence of malignancy. Recommend routine screening mammogram in 1 year.

## 2023-12-28 ENCOUNTER — TELEPHONE (OUTPATIENT)
Dept: FAMILY MEDICINE CLINIC | Facility: CLINIC | Age: 63
End: 2023-12-28

## 2023-12-28 ENCOUNTER — CLINICAL SUPPORT (OUTPATIENT)
Dept: FAMILY MEDICINE CLINIC | Facility: CLINIC | Age: 63
End: 2023-12-28
Payer: MEDICARE

## 2023-12-28 DIAGNOSIS — E53.8 VITAMIN B 12 DEFICIENCY: Primary | ICD-10-CM

## 2023-12-28 PROCEDURE — 96372 THER/PROPH/DIAG INJ SC/IM: CPT

## 2023-12-28 RX ORDER — CYANOCOBALAMIN 1000 UG/ML
1000 INJECTION, SOLUTION INTRAMUSCULAR; SUBCUTANEOUS
Status: SHIPPED | OUTPATIENT
Start: 2023-12-28

## 2023-12-28 RX ADMIN — CYANOCOBALAMIN 1000 MCG: 1000 INJECTION, SOLUTION INTRAMUSCULAR; SUBCUTANEOUS at 13:20

## 2024-01-27 PROBLEM — Z00.00 HEALTHCARE MAINTENANCE: Status: RESOLVED | Noted: 2023-11-28 | Resolved: 2024-01-27

## 2024-02-02 ENCOUNTER — CLINICAL SUPPORT (OUTPATIENT)
Dept: FAMILY MEDICINE CLINIC | Facility: CLINIC | Age: 64
End: 2024-02-02
Payer: MEDICARE

## 2024-02-02 DIAGNOSIS — E53.8 VITAMIN B 12 DEFICIENCY: Primary | ICD-10-CM

## 2024-02-02 PROCEDURE — 96372 THER/PROPH/DIAG INJ SC/IM: CPT

## 2024-02-02 RX ORDER — CYANOCOBALAMIN 1000 UG/ML
1000 INJECTION, SOLUTION INTRAMUSCULAR; SUBCUTANEOUS
Status: SHIPPED | OUTPATIENT
Start: 2024-02-02

## 2024-02-02 RX ADMIN — CYANOCOBALAMIN 1000 MCG: 1000 INJECTION, SOLUTION INTRAMUSCULAR; SUBCUTANEOUS at 15:08

## 2024-02-21 PROBLEM — Z00.00 MEDICARE ANNUAL WELLNESS VISIT, SUBSEQUENT: Status: RESOLVED | Noted: 2020-03-17 | Resolved: 2024-02-21

## 2024-02-21 PROBLEM — Z12.11 SCREENING FOR COLON CANCER: Status: RESOLVED | Noted: 2019-08-23 | Resolved: 2024-02-21

## 2024-02-21 PROBLEM — Z12.39 BREAST CANCER SCREENING: Status: RESOLVED | Noted: 2020-05-21 | Resolved: 2024-02-21

## 2024-02-21 PROBLEM — Z12.39 SCREENING FOR BREAST CANCER: Status: RESOLVED | Noted: 2019-08-23 | Resolved: 2024-02-21

## 2024-03-04 ENCOUNTER — CLINICAL SUPPORT (OUTPATIENT)
Dept: FAMILY MEDICINE CLINIC | Facility: CLINIC | Age: 64
End: 2024-03-04
Payer: MEDICARE

## 2024-03-04 DIAGNOSIS — E53.9 VITAMIN B-COMPLEX DEFICIENCY: Primary | ICD-10-CM

## 2024-03-04 PROCEDURE — 96372 THER/PROPH/DIAG INJ SC/IM: CPT

## 2024-03-04 RX ORDER — CYANOCOBALAMIN 1000 UG/ML
1000 INJECTION, SOLUTION INTRAMUSCULAR; SUBCUTANEOUS
Status: SHIPPED | OUTPATIENT
Start: 2024-03-04

## 2024-03-04 RX ADMIN — CYANOCOBALAMIN 1000 MCG: 1000 INJECTION, SOLUTION INTRAMUSCULAR; SUBCUTANEOUS at 14:34

## 2024-03-06 DIAGNOSIS — J30.1 SEASONAL ALLERGIC RHINITIS DUE TO POLLEN: ICD-10-CM

## 2024-03-06 RX ORDER — LEVOCETIRIZINE DIHYDROCHLORIDE 5 MG/1
5 TABLET, FILM COATED ORAL EVERY EVENING
Qty: 90 TABLET | Refills: 1 | Status: SHIPPED | OUTPATIENT
Start: 2024-03-06

## 2024-03-06 NOTE — TELEPHONE ENCOUNTER
Reason for call:   [x] Refill   [] Prior Auth  [] Other:     Office:   [x] PCP/Provider -   [] Specialty/Provider -     Medication: XYZAL 5 mg 1 daily    Quantity: 90    Pharmacy: [id number] - [place] - [address] [place] [address] [phone]     Does the patient have enough for 3 days?   [] Yes   [x] No - Send as HP to POD

## 2024-04-04 ENCOUNTER — CLINICAL SUPPORT (OUTPATIENT)
Dept: FAMILY MEDICINE CLINIC | Facility: CLINIC | Age: 64
End: 2024-04-04
Payer: MEDICARE

## 2024-04-04 DIAGNOSIS — E53.8 VITAMIN B 12 DEFICIENCY: Primary | ICD-10-CM

## 2024-04-04 PROCEDURE — 96372 THER/PROPH/DIAG INJ SC/IM: CPT

## 2024-04-04 RX ORDER — CYANOCOBALAMIN 1000 UG/ML
1000 INJECTION, SOLUTION INTRAMUSCULAR; SUBCUTANEOUS
Status: SHIPPED | OUTPATIENT
Start: 2024-04-04

## 2024-04-04 RX ADMIN — CYANOCOBALAMIN 1000 MCG: 1000 INJECTION, SOLUTION INTRAMUSCULAR; SUBCUTANEOUS at 13:22

## 2024-04-04 NOTE — PROGRESS NOTES
Patient was in office for nurse visit for Vitamin B12 Injection  Pt received injection in left deltoid.

## 2024-05-07 ENCOUNTER — CLINICAL SUPPORT (OUTPATIENT)
Dept: FAMILY MEDICINE CLINIC | Facility: CLINIC | Age: 64
End: 2024-05-07
Payer: MEDICARE

## 2024-05-07 DIAGNOSIS — E53.8 VITAMIN B 12 DEFICIENCY: Primary | ICD-10-CM

## 2024-05-07 PROCEDURE — 96372 THER/PROPH/DIAG INJ SC/IM: CPT

## 2024-05-07 RX ORDER — CYANOCOBALAMIN 1000 UG/ML
1000 INJECTION, SOLUTION INTRAMUSCULAR; SUBCUTANEOUS
Status: SHIPPED | OUTPATIENT
Start: 2024-05-07

## 2024-05-07 RX ADMIN — CYANOCOBALAMIN 1000 MCG: 1000 INJECTION, SOLUTION INTRAMUSCULAR; SUBCUTANEOUS at 13:22

## 2024-05-07 NOTE — PROGRESS NOTES
Pt was in office for nurse visit for vitamin b12 injection   Pt received injection in left deltoid

## 2024-06-07 ENCOUNTER — OFFICE VISIT (OUTPATIENT)
Dept: FAMILY MEDICINE CLINIC | Facility: CLINIC | Age: 64
End: 2024-06-07
Payer: MEDICARE

## 2024-06-07 VITALS
BODY MASS INDEX: 23.32 KG/M2 | DIASTOLIC BLOOD PRESSURE: 80 MMHG | OXYGEN SATURATION: 98 % | HEIGHT: 63 IN | TEMPERATURE: 97.8 F | SYSTOLIC BLOOD PRESSURE: 126 MMHG | HEART RATE: 86 BPM | RESPIRATION RATE: 16 BRPM | WEIGHT: 131.6 LBS

## 2024-06-07 DIAGNOSIS — J30.1 SEASONAL ALLERGIC RHINITIS DUE TO POLLEN: Primary | ICD-10-CM

## 2024-06-07 DIAGNOSIS — H60.502 ACUTE NONINFECTIVE OTITIS EXTERNA OF LEFT EAR, UNSPECIFIED TYPE: ICD-10-CM

## 2024-06-07 DIAGNOSIS — F31.81 BIPOLAR 2 DISORDER (HCC): ICD-10-CM

## 2024-06-07 DIAGNOSIS — L30.9 DERMATITIS: ICD-10-CM

## 2024-06-07 DIAGNOSIS — E53.8 VITAMIN B 12 DEFICIENCY: ICD-10-CM

## 2024-06-07 PROCEDURE — 99214 OFFICE O/P EST MOD 30 MIN: CPT | Performed by: NURSE PRACTITIONER

## 2024-06-07 PROCEDURE — 96372 THER/PROPH/DIAG INJ SC/IM: CPT

## 2024-06-07 RX ORDER — NEOMYCIN SULFATE, POLYMYXIN B SULFATE AND HYDROCORTISONE 10; 3.5; 1 MG/ML; MG/ML; [USP'U]/ML
4 SUSPENSION/ DROPS AURICULAR (OTIC) 4 TIMES DAILY
Qty: 10 ML | Refills: 0 | Status: SHIPPED | OUTPATIENT
Start: 2024-06-07

## 2024-06-07 RX ORDER — LUMATEPERONE 42 MG/1
42 CAPSULE ORAL DAILY
COMMUNITY
Start: 2024-05-14

## 2024-06-07 RX ORDER — TRIAMCINOLONE ACETONIDE 1 MG/G
CREAM TOPICAL 2 TIMES DAILY
Qty: 30 G | Refills: 0 | Status: SHIPPED | OUTPATIENT
Start: 2024-06-07

## 2024-06-07 RX ORDER — CYANOCOBALAMIN 1000 UG/ML
1000 INJECTION, SOLUTION INTRAMUSCULAR; SUBCUTANEOUS
Status: SHIPPED | OUTPATIENT
Start: 2024-06-07

## 2024-06-07 RX ORDER — AZELASTINE 1 MG/ML
1 SPRAY, METERED NASAL 2 TIMES DAILY
Qty: 1 ML | Refills: 1 | Status: SHIPPED | OUTPATIENT
Start: 2024-06-07

## 2024-06-07 RX ORDER — LEVOCETIRIZINE DIHYDROCHLORIDE 5 MG/1
5 TABLET, FILM COATED ORAL EVERY EVENING
Qty: 90 TABLET | Refills: 1 | Status: SHIPPED | OUTPATIENT
Start: 2024-06-07

## 2024-06-07 RX ORDER — CLOTRIMAZOLE AND BETAMETHASONE DIPROPIONATE 10; .64 MG/G; MG/G
CREAM TOPICAL 2 TIMES DAILY
Qty: 45 G | Refills: 1 | Status: SHIPPED | OUTPATIENT
Start: 2024-06-07

## 2024-06-07 RX ADMIN — CYANOCOBALAMIN 1000 MCG: 1000 INJECTION, SOLUTION INTRAMUSCULAR; SUBCUTANEOUS at 13:26

## 2024-06-07 NOTE — PROGRESS NOTES
Ambulatory Visit  Name: Grace Alcala      : 1960      MRN: [id number]  Encounter Provider: WHITNEY Restrepo  Encounter Date: 2024   Encounter department: [facility]    Assessment & Plan   1. Seasonal allergic rhinitis due to pollen  Assessment & Plan:  - Well controlled with use of Xyzal. Continue same.   - Prescription sent for Astelin.   - Will continue to monitor.  Orders:  -     levocetirizine (XYZAL) 5 MG tablet; Take 1 tablet (5 mg total) by mouth every evening  -     azelastine (ASTELIN) 0.1 % nasal spray; 1 spray into each nostril 2 (two) times a day Use in each nostril as directed  2. Acute noninfective otitis externa of left ear, unspecified type  Assessment & Plan:  - Prescription sent for Cortisporin.     Orders:  -     neomycin-polymyxin-hydrocortisone (CORTISPORIN) 0.35%-10,000 units/mL-1% otic suspension; Administer 4 drops into the left ear 4 (four) times a day  3. Bipolar 2 disorder (HCC)  Assessment & Plan:  - Stable on current medication regimen.  - Continue routine follow up with Psychiatrist.   4. Vitamin B 12 deficiency  -     cyanocobalamin injection 1,000 mcg  5. Dermatitis  Comments:  She was given Lotrisone cream.  Orders:  -     clotrimazole-betamethasone (LOTRISONE) 1-0.05 % cream; Apply topically 2 (two) times a day  -     triamcinolone (KENALOG) 0.1 % cream; Apply topically 2 (two) times a day         History of Present Illness     Patient presents to office today with complaints of fullness and itching and bilateral ears. Denies any fever. She does have seasonal allergies for which she takes Xyzal. She denies any other concerns or complaints today.         Review of Systems   Constitutional:  Negative for fatigue and fever.   HENT:  Negative for trouble swallowing.         Ear fullness, itching   Eyes:  Negative for visual disturbance.   Respiratory:  Negative for cough and shortness of breath.    Cardiovascular:  Negative for chest  pain and palpitations.   Gastrointestinal:  Negative for abdominal pain and blood in stool.   Endocrine: Negative for cold intolerance and heat intolerance.   Genitourinary:  Negative for difficulty urinating and dysuria.   Musculoskeletal:  Negative for gait problem.   Skin:  Negative for rash.   Allergic/Immunologic: Positive for environmental allergies.   Neurological:  Negative for dizziness, syncope and headaches.   Hematological:  Negative for adenopathy.   Psychiatric/Behavioral:  Negative for behavioral problems.      Past Medical History:   Diagnosis Date   • Bipolar affective disorder, depressed (HCC)    • Colon polyp    • GERD (gastroesophageal reflux disease)    • Heart murmur    • Osteoarthritis    • Psychiatric disorder    • Subdural hematoma (HCC)    • Vitamin B12 deficiency      Past Surgical History:   Procedure Laterality Date   • BREAST LUMPECTOMY Right     benign   • CERVICAL SPINE SURGERY  2002   • CHOLECYSTECTOMY  05/1985   • FOOT FRACTURE SURGERY Right 2012    outcome - good   • FOOT SURGERY Left    • GASTRIC BYPASS  2004    x2- 2nd bypass 10/12   • HYSTERECTOMY      age 45   • KNEE SURGERY Right 01/30/2020     Family History   Problem Relation Age of Onset   • No Known Problems Mother    • Diabetes Father    • Heart disease Father    • No Known Problems Maternal Grandmother    • No Known Problems Maternal Grandfather    • No Known Problems Paternal Grandmother    • No Known Problems Paternal Grandfather    • No Known Problems Son    • Colon cancer Maternal Uncle    • Breast cancer Paternal Aunt    • Breast cancer Paternal Aunt    • Coronary artery disease Family    • Heart attack Family    • Breast cancer Half-Sister 66   • Brain cancer Half-Sister    • No Known Problems Half-Brother    • No Known Problems Half-Brother      Social History     Tobacco Use   • Smoking status: Never   • Smokeless tobacco: Never   • Tobacco comments:     passive smoke exposure   Vaping Use   • Vaping status:  Never Used   Substance and Sexual Activity   • Alcohol use: Not Currently   • Drug use: No   • Sexual activity: Not on file     Current Outpatient Medications on File Prior to Visit   Medication Sig   • buPROPion (WELLBUTRIN) 100 mg tablet Take 200 mg by mouth 2 (two) times a day   • Caplyta 42 MG CAPS capsule Take 42 mg by mouth daily   • esomeprazole (NexIUM) 40 MG capsule Take 40 mg by mouth every morning before breakfast   • Fetzima 40 MG extended release capsule Take 40 mg by mouth daily   • pantoprazole (PROTONIX) 40 mg tablet Take 1 tablet (40 mg total) by mouth daily   • phentermine 37.5 MG capsule Take 37.5 mg by mouth every morning   • QUEtiapine (SEROquel) 400 MG tablet Take 800 mg by mouth daily at bedtime   • tiZANidine (ZANAFLEX) 4 mg tablet Take 4 mg by mouth 3 (three) times a day   • topiramate (TOPAMAX) 100 mg tablet Take 100 mg by mouth 2 (two) times a day   • traMADol (ULTRAM) 50 mg tablet Take 1 tablet daily as needed   • [DISCONTINUED] levocetirizine (XYZAL) 5 MG tablet Take 1 tablet (5 mg total) by mouth every evening   • acetaminophen (TYLENOL) 650 mg CR tablet 1 as directed for pain (Patient not taking: Reported on 10/10/2023)   • ARIPiprazole (ABILIFY) 10 mg tablet  (Patient not taking: Reported on 10/10/2023)   • buprenorphine (BUTRANS) 15 mcg/hr Place 1 patch on the skin once a week (Patient not taking: Reported on 5/17/2023)   • busPIRone (BUSPAR) 15 mg tablet Take 15 mg by mouth 2 (two) times a day (Patient not taking: Reported on 11/28/2023)   • DULoxetine (CYMBALTA) 30 mg delayed release capsule  (Patient not taking: Reported on 11/28/2023)   • DULoxetine (CYMBALTA) 60 mg delayed release capsule Take 120 mg by mouth daily (Patient not taking: Reported on 11/28/2023)   • gabapentin (NEURONTIN) 600 MG tablet Take 1,200 mg by mouth daily at bedtime (Patient not taking: Reported on 5/17/2023)   • gabapentin (NEURONTIN) 800 mg tablet Take 1,600 mg by mouth daily at bedtime  (Patient not  taking: Reported on 3/14/2022)   • hydrOXYzine HCL (ATARAX) 50 mg tablet Take 50 mg by mouth daily at bedtime   (Patient not taking: Reported on 11/28/2023)   • LORazepam (ATIVAN) 0.5 mg tablet Take 0.5 mg by mouth daily as needed (Patient not taking: Reported on 11/28/2023)   • nystatin (MYCOSTATIN) cream Apply topically 2 (two) times a day (Patient not taking: Reported on 11/28/2023)   • ondansetron (ZOFRAN) 4 mg tablet Take 1 tablet (4 mg total) by mouth every 8 (eight) hours as needed for nausea or vomiting (Patient not taking: Reported on 11/28/2023)   • oxyCODONE-acetaminophen (PERCOCET) 5-325 mg per tablet Take 1 tablet by mouth every 6 (six) hours as needed   (Patient not taking: Reported on 10/10/2023)   • Rexulti 3 MG tablet Take 3 mg by mouth daily (Patient not taking: Reported on 10/10/2023)   • traZODone (DESYREL) 100 mg tablet Take 100 mg by mouth daily at bedtime (Patient not taking: Reported on 11/28/2023)   • Trintellix 10 MG tablet take 1 tablet by mouth once daily IN ADDITION TO 5 MG DAILY DOSING (Patient not taking: Reported on 5/17/2023)   • Trintellix 5 MG tablet take 1 tablet by mouth once daily IN ADDITION TO 10 MG ONCE DAILY DOSING (Patient not taking: Reported on 11/15/2022)   • valACYclovir (VALTREX) 1,000 mg tablet Take 2 tablets (2,000 mg total) by mouth 2 (two) times a day for 1 day   • [DISCONTINUED] clotrimazole-betamethasone (LOTRISONE) 1-0.05 % cream Apply topically 2 (two) times a day     Allergies   Allergen Reactions   • Prochlorperazine Other (See Comments)     seizure,swollen tongue   • Rofecoxib Other (See Comments)     internal bleeding   • Erythromycin Base Other (See Comments)     seizure     Immunization History   Administered Date(s) Administered   • COVID-19 PFIZER VACCINE 0.3 ML IM 03/29/2021, 04/19/2021, 11/22/2021   • INFLUENZA 09/20/2016, 09/20/2016, 11/16/2017, 11/16/2017, 11/15/2022, 10/24/2023   • Influenza Split 09/17/2013   • Influenza, injectable, quadrivalent,  "preservative free 0.5 mL 09/20/2018, 10/24/2023, 11/28/2023   • Influenza, recombinant, quadrivalent,injectable, preservative free 11/21/2019, 10/20/2020, 01/06/2022, 11/15/2022   • Influenza, seasonal, injectable 07/02/2012   • Tdap 10/22/2003, 06/25/2017   • Zoster Vaccine Recombinant 07/09/2023, 10/03/2023     Objective     /80 (BP Location: Left arm, Patient Position: Sitting, Cuff Size: Large)   Pulse 86   Temp 97.8 °F (36.6 °C) (Tympanic)   Resp 16   Ht 5' 2.5\" (1.588 m)   Wt 59.7 kg (131 lb 9.6 oz)   SpO2 98%   BMI 23.69 kg/m²     Physical Exam  Vitals and nursing note reviewed.   Constitutional:       Appearance: Normal appearance. She is well-developed.   HENT:      Head: Normocephalic and atraumatic.      Right Ear: Tympanic membrane, ear canal and external ear normal.      Left Ear: External ear normal. Drainage present.   Eyes:      Conjunctiva/sclera: Conjunctivae normal.   Cardiovascular:      Rate and Rhythm: Normal rate and regular rhythm.      Heart sounds: Normal heart sounds.   Pulmonary:      Effort: Pulmonary effort is normal.      Breath sounds: Normal breath sounds.   Musculoskeletal:         General: Normal range of motion.      Cervical back: Normal range of motion.   Lymphadenopathy:      Cervical: No cervical adenopathy.   Skin:     General: Skin is warm and dry.   Neurological:      Mental Status: She is alert and oriented to person, place, and time.   Psychiatric:         Mood and Affect: Mood normal.         Behavior: Behavior normal.     Administrative Statements         "

## 2024-06-07 NOTE — ASSESSMENT & PLAN NOTE
- Well controlled with use of Xyzal. Continue same.   - Prescription sent for Astelin.   - Will continue to monitor.

## 2024-06-13 ENCOUNTER — PREP FOR PROCEDURE (OUTPATIENT)
Dept: GASTROENTEROLOGY | Facility: MEDICAL CENTER | Age: 64
End: 2024-06-13

## 2024-06-13 ENCOUNTER — TELEPHONE (OUTPATIENT)
Dept: GASTROENTEROLOGY | Facility: MEDICAL CENTER | Age: 64
End: 2024-06-13

## 2024-06-13 DIAGNOSIS — Z86.010 HISTORY OF COLON POLYPS: ICD-10-CM

## 2024-06-13 DIAGNOSIS — D50.9 IRON DEFICIENCY ANEMIA, UNSPECIFIED IRON DEFICIENCY ANEMIA TYPE: Primary | ICD-10-CM

## 2024-06-13 NOTE — TELEPHONE ENCOUNTER
Procedure: Colonoscopy  Date: 07/29/2024  Physician performing: Dr. Elias  [place]  Instructions given to patient: Yes  Diabetic: No  Clearances: N/A    Instructions mailed per patient request.

## 2024-07-07 PROBLEM — H60.502 ACUTE NONINFECTIVE OTITIS EXTERNA OF LEFT EAR: Status: RESOLVED | Noted: 2024-06-07 | Resolved: 2024-07-07

## 2024-07-09 ENCOUNTER — APPOINTMENT (OUTPATIENT)
Dept: LAB | Age: 64
End: 2024-07-09
Payer: MEDICARE

## 2024-07-09 ENCOUNTER — CLINICAL SUPPORT (OUTPATIENT)
Dept: FAMILY MEDICINE CLINIC | Facility: CLINIC | Age: 64
End: 2024-07-09
Payer: MEDICARE

## 2024-07-09 DIAGNOSIS — E53.8 VITAMIN B 12 DEFICIENCY: ICD-10-CM

## 2024-07-09 DIAGNOSIS — Z00.00 HEALTHCARE MAINTENANCE: ICD-10-CM

## 2024-07-09 DIAGNOSIS — D50.9 IRON DEFICIENCY ANEMIA, UNSPECIFIED IRON DEFICIENCY ANEMIA TYPE: ICD-10-CM

## 2024-07-09 DIAGNOSIS — D51.8 OTHER VITAMIN B12 DEFICIENCY ANEMIA: ICD-10-CM

## 2024-07-09 DIAGNOSIS — E66.3 SEVERELY OVERWEIGHT: ICD-10-CM

## 2024-07-09 DIAGNOSIS — E78.2 MIXED HYPERLIPIDEMIA: ICD-10-CM

## 2024-07-09 DIAGNOSIS — E55.9 AVITAMINOSIS D: ICD-10-CM

## 2024-07-09 DIAGNOSIS — E51.9 THIAMIN DEFICIENCY: ICD-10-CM

## 2024-07-09 DIAGNOSIS — E53.8 VITAMIN B 12 DEFICIENCY: Primary | ICD-10-CM

## 2024-07-09 DIAGNOSIS — E55.9 VITAMIN D INSUFFICIENCY: ICD-10-CM

## 2024-07-09 DIAGNOSIS — D64.9 ANEMIA, UNSPECIFIED TYPE: ICD-10-CM

## 2024-07-09 LAB
25(OH)D3 SERPL-MCNC: 26.2 NG/ML (ref 30–100)
ALBUMIN SERPL BCG-MCNC: 3.7 G/DL (ref 3.5–5)
ALP SERPL-CCNC: 82 U/L (ref 34–104)
ALT SERPL W P-5'-P-CCNC: 25 U/L (ref 7–52)
ANION GAP SERPL CALCULATED.3IONS-SCNC: 8 MMOL/L (ref 4–13)
AST SERPL W P-5'-P-CCNC: 24 U/L (ref 13–39)
BASOPHILS # BLD AUTO: 0.04 THOUSANDS/ÂΜL (ref 0–0.1)
BASOPHILS NFR BLD AUTO: 1 % (ref 0–1)
BILIRUB SERPL-MCNC: 0.34 MG/DL (ref 0.2–1)
BUN SERPL-MCNC: 11 MG/DL (ref 5–25)
CALCIUM SERPL-MCNC: 8.5 MG/DL (ref 8.4–10.2)
CHLORIDE SERPL-SCNC: 105 MMOL/L (ref 96–108)
CHOLEST SERPL-MCNC: 163 MG/DL
CO2 SERPL-SCNC: 21 MMOL/L (ref 21–32)
CREAT SERPL-MCNC: 1 MG/DL (ref 0.6–1.3)
EOSINOPHIL # BLD AUTO: 0 THOUSAND/ÂΜL (ref 0–0.61)
EOSINOPHIL NFR BLD AUTO: 0 % (ref 0–6)
ERYTHROCYTE [DISTWIDTH] IN BLOOD BY AUTOMATED COUNT: 14 % (ref 11.6–15.1)
FERRITIN SERPL-MCNC: 45 NG/ML (ref 11–307)
GFR SERPL CREATININE-BSD FRML MDRD: 59 ML/MIN/1.73SQ M
GLUCOSE P FAST SERPL-MCNC: 86 MG/DL (ref 65–99)
HCT VFR BLD AUTO: 32.2 % (ref 34.8–46.1)
HDLC SERPL-MCNC: 80 MG/DL
HGB BLD-MCNC: 10.8 G/DL (ref 11.5–15.4)
IMM GRANULOCYTES # BLD AUTO: 0.02 THOUSAND/UL (ref 0–0.2)
IMM GRANULOCYTES NFR BLD AUTO: 0 % (ref 0–2)
IRON SATN MFR SERPL: 19 % (ref 15–50)
IRON SERPL-MCNC: 58 UG/DL (ref 50–212)
LDLC SERPL CALC-MCNC: 71 MG/DL (ref 0–100)
LYMPHOCYTES # BLD AUTO: 1.98 THOUSANDS/ÂΜL (ref 0.6–4.47)
LYMPHOCYTES NFR BLD AUTO: 27 % (ref 14–44)
MCH RBC QN AUTO: 30.3 PG (ref 26.8–34.3)
MCHC RBC AUTO-ENTMCNC: 33.5 G/DL (ref 31.4–37.4)
MCV RBC AUTO: 90 FL (ref 82–98)
MONOCYTES # BLD AUTO: 0.51 THOUSAND/ÂΜL (ref 0.17–1.22)
MONOCYTES NFR BLD AUTO: 7 % (ref 4–12)
NEUTROPHILS # BLD AUTO: 4.75 THOUSANDS/ÂΜL (ref 1.85–7.62)
NEUTS SEG NFR BLD AUTO: 65 % (ref 43–75)
NRBC BLD AUTO-RTO: 0 /100 WBCS
PLATELET # BLD AUTO: 240 THOUSANDS/UL (ref 149–390)
PMV BLD AUTO: 10.5 FL (ref 8.9–12.7)
POTASSIUM SERPL-SCNC: 3.5 MMOL/L (ref 3.5–5.3)
PROT SERPL-MCNC: 5.9 G/DL (ref 6.4–8.4)
RBC # BLD AUTO: 3.56 MILLION/UL (ref 3.81–5.12)
SODIUM SERPL-SCNC: 134 MMOL/L (ref 135–147)
TIBC SERPL-MCNC: 308 UG/DL (ref 250–450)
TRIGL SERPL-MCNC: 60 MG/DL
TSH SERPL DL<=0.05 MIU/L-ACNC: 1.52 UIU/ML (ref 0.45–4.5)
UIBC SERPL-MCNC: 250 UG/DL (ref 155–355)
VIT B12 SERPL-MCNC: >7500 PG/ML (ref 180–914)
WBC # BLD AUTO: 7.3 THOUSAND/UL (ref 4.31–10.16)

## 2024-07-09 PROCEDURE — 82607 VITAMIN B-12: CPT

## 2024-07-09 PROCEDURE — 84425 ASSAY OF VITAMIN B-1: CPT

## 2024-07-09 PROCEDURE — 80053 COMPREHEN METABOLIC PANEL: CPT

## 2024-07-09 PROCEDURE — 36415 COLL VENOUS BLD VENIPUNCTURE: CPT

## 2024-07-09 PROCEDURE — 83550 IRON BINDING TEST: CPT

## 2024-07-09 PROCEDURE — 96372 THER/PROPH/DIAG INJ SC/IM: CPT

## 2024-07-09 PROCEDURE — 82728 ASSAY OF FERRITIN: CPT

## 2024-07-09 PROCEDURE — 85025 COMPLETE CBC W/AUTO DIFF WBC: CPT

## 2024-07-09 PROCEDURE — 84443 ASSAY THYROID STIM HORMONE: CPT

## 2024-07-09 PROCEDURE — 83540 ASSAY OF IRON: CPT

## 2024-07-09 PROCEDURE — 80061 LIPID PANEL: CPT

## 2024-07-09 PROCEDURE — 82306 VITAMIN D 25 HYDROXY: CPT

## 2024-07-09 RX ORDER — CYANOCOBALAMIN 1000 UG/ML
1000 INJECTION, SOLUTION INTRAMUSCULAR; SUBCUTANEOUS
Status: SHIPPED | OUTPATIENT
Start: 2024-07-09

## 2024-07-09 RX ADMIN — CYANOCOBALAMIN 1000 MCG: 1000 INJECTION, SOLUTION INTRAMUSCULAR; SUBCUTANEOUS at 14:03

## 2024-07-09 NOTE — PROGRESS NOTES
Pt was seen in office for nurse visit for Vitamin B12 injection   Pt received injection in left deltoid

## 2024-07-11 DIAGNOSIS — D64.9 ANEMIA, UNSPECIFIED TYPE: Primary | ICD-10-CM

## 2024-07-12 ENCOUNTER — TELEPHONE (OUTPATIENT)
Dept: FAMILY MEDICINE CLINIC | Facility: CLINIC | Age: 64
End: 2024-07-12

## 2024-07-12 DIAGNOSIS — H60.502 ACUTE NONINFECTIVE OTITIS EXTERNA OF LEFT EAR, UNSPECIFIED TYPE: ICD-10-CM

## 2024-07-12 LAB — VIT B1 BLD-SCNC: 113.5 NMOL/L (ref 66.5–200)

## 2024-07-12 NOTE — TELEPHONE ENCOUNTER
Pt states that she lost her original bottle.    Reason for call:   [x] Refill   [] Prior Auth  [] Other:     Office:   [x] PCP/Provider -   [] Specialty/Provider -               Does the patient have enough for 3 days?   [] Yes   [] No - Send as HP to POD

## 2024-07-12 NOTE — TELEPHONE ENCOUNTER
Pt called requesting a refill on ear drops that she lost/misplaced.    Warm transferred to [facility] Rx line for further assistance.

## 2024-07-12 NOTE — TELEPHONE ENCOUNTER
----- Message from WHITNEY Stewart sent at 7/11/2024  3:30 PM EDT -----  Labs show elevated b12 likely due to her injection that day. Her kidney function is slightly decreased. Recommend increasing oral hydration and we will continue to monitor. Her vitamin D is low. I recommend daily vitamin D supplement of 2000 units/day. Hemoglobin is low but iron panel and ferritin are normal. Recommend repeat CBC in 1 month.

## 2024-07-15 ENCOUNTER — ANESTHESIA EVENT (OUTPATIENT)
Dept: ANESTHESIOLOGY | Facility: HOSPITAL | Age: 64
End: 2024-07-15

## 2024-07-15 ENCOUNTER — ANESTHESIA (OUTPATIENT)
Dept: ANESTHESIOLOGY | Facility: HOSPITAL | Age: 64
End: 2024-07-15

## 2024-07-15 RX ORDER — NEOMYCIN SULFATE, POLYMYXIN B SULFATE AND HYDROCORTISONE 10; 3.5; 1 MG/ML; MG/ML; [USP'U]/ML
4 SUSPENSION/ DROPS AURICULAR (OTIC) 4 TIMES DAILY
Qty: 10 ML | Refills: 0 | Status: SHIPPED | OUTPATIENT
Start: 2024-07-15

## 2024-07-18 ENCOUNTER — TELEPHONE (OUTPATIENT)
Dept: GASTROENTEROLOGY | Facility: MEDICAL CENTER | Age: 64
End: 2024-07-18

## 2024-07-18 NOTE — TELEPHONE ENCOUNTER
Confirming Upcoming Procedure: Colonoscopy on 07/29/2024  Physician performing: Dr. Elias  Location of procedure:  [place]  Prep: Miralax    Confirmed. Patient requested instructions to be mail. Mailed today.

## 2024-07-25 ENCOUNTER — TELEPHONE (OUTPATIENT)
Dept: GASTROENTEROLOGY | Facility: MEDICAL CENTER | Age: 64
End: 2024-07-25

## 2024-07-25 ENCOUNTER — TELEPHONE (OUTPATIENT)
Dept: OTHER | Facility: OTHER | Age: 64
End: 2024-07-25

## 2024-07-25 DIAGNOSIS — Z86.010 HISTORY OF COLON POLYPS: Primary | ICD-10-CM

## 2024-07-25 DIAGNOSIS — J30.1 SEASONAL ALLERGIC RHINITIS DUE TO POLLEN: ICD-10-CM

## 2024-07-25 RX ORDER — AZELASTINE HYDROCHLORIDE 137 UG/1
1 SPRAY, METERED NASAL 2 TIMES DAILY
Qty: 30 ML | Refills: 5 | Status: SHIPPED | OUTPATIENT
Start: 2024-07-25

## 2024-07-25 NOTE — TELEPHONE ENCOUNTER
Please call patient.  She is scheduled for colonoscopy on Monday.  It appears she was sent MiraLAX instructions.  This prep did not work for her last time so I would recommend GoLytely.  I have sent this to her pharmacy. Please let her know the change and provide instructions

## 2024-07-25 NOTE — TELEPHONE ENCOUNTER
Pt reached after hours service, she states she has an endoscopy scheduled for Monday and was reading the instructions. She was recently prescribed a weekly iron supplement and took it on Monday but the pp work says not to. Could you please call pt to discuss.

## 2024-07-26 NOTE — TELEPHONE ENCOUNTER
Called patient to let her know Dr. Elias reviewed and stated that patient should be fine for the procedure; also reviewed that prep is Golytely     Patient requested to reschedule as she not only had her iron treatment but has also had about 12 ears of corn this week.      Rescheduled procedure for 8/29 with Dr. Elias at       Sent patient Golytely prep instructions to home address

## 2024-08-06 ENCOUNTER — OFFICE VISIT (OUTPATIENT)
Dept: FAMILY MEDICINE CLINIC | Facility: CLINIC | Age: 64
End: 2024-08-06
Payer: MEDICARE

## 2024-08-06 VITALS
RESPIRATION RATE: 16 BRPM | BODY MASS INDEX: 23.32 KG/M2 | OXYGEN SATURATION: 98 % | WEIGHT: 131.6 LBS | HEART RATE: 78 BPM | HEIGHT: 63 IN | SYSTOLIC BLOOD PRESSURE: 120 MMHG | DIASTOLIC BLOOD PRESSURE: 84 MMHG | TEMPERATURE: 97.4 F

## 2024-08-06 DIAGNOSIS — J01.00 ACUTE NON-RECURRENT MAXILLARY SINUSITIS: Primary | ICD-10-CM

## 2024-08-06 PROCEDURE — 99213 OFFICE O/P EST LOW 20 MIN: CPT | Performed by: NURSE PRACTITIONER

## 2024-08-06 RX ORDER — MINOCYCLINE HYDROCHLORIDE 100 MG/1
100 CAPSULE ORAL EVERY 12 HOURS SCHEDULED
Qty: 14 CAPSULE | Refills: 0 | Status: SHIPPED | OUTPATIENT
Start: 2024-08-06 | End: 2024-08-13

## 2024-08-06 RX ORDER — LAMOTRIGINE 25 MG/1
25 TABLET ORAL DAILY
COMMUNITY
Start: 2024-07-24

## 2024-08-06 NOTE — PROGRESS NOTES
Ambulatory Visit  Name: Grace Alcala      : 1960      MRN: [id number]  Encounter Provider: WHITNEY Restrepo  Encounter Date: 2024   Encounter department: [facility] [address] [facility]    Assessment & Plan   1. Acute non-recurrent maxillary sinusitis  Assessment & Plan:  - Prescription sent for doxycycline. Advised of side effects.   - Increase oral hydration and use humidifier.  - Contact office if symptoms do not improve.   Orders:  -     minocycline (MINOCIN) 100 mg capsule; Take 1 capsule (100 mg total) by mouth every 12 (twelve) hours for 7 days       History of Present Illness     Patient presents to office today with complaints of congestion, sore throat, and ear pain that has been occurring for the past week. Endorses positive sick contacts. She took OTC Sudafed with some improvement. She denies any other concerns or complaints today.         Review of Systems   Constitutional:  Negative for fatigue and fever.   HENT:  Positive for congestion, ear pain and sore throat. Negative for trouble swallowing.    Eyes:  Negative for visual disturbance.   Respiratory:  Negative for shortness of breath.    Cardiovascular:  Negative for chest pain and palpitations.   Gastrointestinal:  Negative for abdominal pain and blood in stool.   Endocrine: Negative for cold intolerance and heat intolerance.   Genitourinary:  Negative for difficulty urinating and dysuria.   Musculoskeletal:  Negative for gait problem.   Skin:  Negative for rash.   Neurological:  Negative for dizziness, syncope and headaches.   Hematological:  Negative for adenopathy.   Psychiatric/Behavioral:  Negative for behavioral problems.      Past Medical History:   Diagnosis Date   • Bipolar affective disorder, depressed (HCC)    • Colon polyp    • GERD (gastroesophageal reflux disease)    • Heart murmur    • Osteoarthritis    • Psychiatric disorder    • Subdural hematoma (HCC)    • Vitamin B12 deficiency      Past Surgical  History:   Procedure Laterality Date   • BREAST LUMPECTOMY Right     benign   • CERVICAL SPINE SURGERY  2002   • CHOLECYSTECTOMY  05/1985   • FOOT FRACTURE SURGERY Right 2012    outcome - good   • FOOT SURGERY Left    • GASTRIC BYPASS  2004    x2- 2nd bypass 10/12   • HYSTERECTOMY      age 45   • KNEE SURGERY Right 01/30/2020     Family History   Problem Relation Age of Onset   • No Known Problems Mother    • Diabetes Father    • Heart disease Father    • No Known Problems Maternal Grandmother    • No Known Problems Maternal Grandfather    • No Known Problems Paternal Grandmother    • No Known Problems Paternal Grandfather    • No Known Problems Son    • Colon cancer Maternal Uncle    • Breast cancer Paternal Aunt    • Breast cancer Paternal Aunt    • Coronary artery disease Family    • Heart attack Family    • Breast cancer Half-Sister 66   • Brain cancer Half-Sister    • No Known Problems Half-Brother    • No Known Problems Half-Brother      Social History     Tobacco Use   • Smoking status: Never   • Smokeless tobacco: Never   • Tobacco comments:     passive smoke exposure   Vaping Use   • Vaping status: Never Used   Substance and Sexual Activity   • Alcohol use: Not Currently   • Drug use: No   • Sexual activity: Not on file     Current Outpatient Medications on File Prior to Visit   Medication Sig   • Azelastine HCl 137 MCG/SPRAY SOLN instill 1 spray into each nostril twice a day   • Caplyta 42 MG CAPS capsule Take 42 mg by mouth daily   • clotrimazole-betamethasone (LOTRISONE) 1-0.05 % cream Apply topically 2 (two) times a day   • esomeprazole (NexIUM) 40 MG capsule Take 40 mg by mouth every morning before breakfast   • Fetzima 40 MG extended release capsule Take 40 mg by mouth daily   • lamoTRIgine (LaMICtal) 25 mg tablet Take 25 mg by mouth daily   • levocetirizine (XYZAL) 5 MG tablet Take 1 tablet (5 mg total) by mouth every evening   • pantoprazole (PROTONIX) 40 mg tablet Take 1 tablet (40 mg total) by  mouth daily   • phentermine 37.5 MG capsule Take 37.5 mg by mouth every morning   • QUEtiapine (SEROquel) 400 MG tablet Take 800 mg by mouth daily at bedtime   • tiZANidine (ZANAFLEX) 4 mg tablet Take 4 mg by mouth 3 (three) times a day   • topiramate (TOPAMAX) 100 mg tablet Take 100 mg by mouth 2 (two) times a day   • traMADol (ULTRAM) 50 mg tablet Take 1 tablet daily as needed   • triamcinolone (KENALOG) 0.1 % cream Apply topically 2 (two) times a day   • acetaminophen (TYLENOL) 650 mg CR tablet 1 as directed for pain (Patient not taking: Reported on 10/10/2023)   • ARIPiprazole (ABILIFY) 10 mg tablet  (Patient not taking: Reported on 10/10/2023)   • buprenorphine (BUTRANS) 15 mcg/hr Place 1 patch on the skin once a week (Patient not taking: Reported on 8/6/2024)   • buPROPion (WELLBUTRIN) 100 mg tablet Take 200 mg by mouth 2 (two) times a day   • busPIRone (BUSPAR) 15 mg tablet Take 15 mg by mouth 2 (two) times a day (Patient not taking: Reported on 11/28/2023)   • DULoxetine (CYMBALTA) 30 mg delayed release capsule  (Patient not taking: Reported on 11/28/2023)   • DULoxetine (CYMBALTA) 60 mg delayed release capsule Take 120 mg by mouth daily (Patient not taking: Reported on 11/28/2023)   • gabapentin (NEURONTIN) 600 MG tablet Take 1,200 mg by mouth daily at bedtime (Patient not taking: Reported on 5/17/2023)   • gabapentin (NEURONTIN) 800 mg tablet Take 1,600 mg by mouth daily at bedtime  (Patient not taking: Reported on 3/14/2022)   • hydrOXYzine HCL (ATARAX) 50 mg tablet Take 50 mg by mouth daily at bedtime   (Patient not taking: Reported on 11/28/2023)   • LORazepam (ATIVAN) 0.5 mg tablet Take 0.5 mg by mouth daily as needed (Patient not taking: Reported on 11/28/2023)   • neomycin-polymyxin-hydrocortisone (CORTISPORIN) 0.35%-10,000 units/mL-1% otic suspension Administer 4 drops into the left ear 4 (four) times a day   • nystatin (MYCOSTATIN) cream Apply topically 2 (two) times a day (Patient not taking:  Reported on 11/28/2023)   • ondansetron (ZOFRAN) 4 mg tablet Take 1 tablet (4 mg total) by mouth every 8 (eight) hours as needed for nausea or vomiting (Patient not taking: Reported on 11/28/2023)   • oxyCODONE-acetaminophen (PERCOCET) 5-325 mg per tablet Take 1 tablet by mouth every 6 (six) hours as needed   (Patient not taking: Reported on 10/10/2023)   • polyethylene glycol (GOLYTELY) 4000 mL solution Take 4,000 mL by mouth once for 1 dose   • Rexulti 3 MG tablet Take 3 mg by mouth daily (Patient not taking: Reported on 10/10/2023)   • traZODone (DESYREL) 100 mg tablet Take 100 mg by mouth daily at bedtime (Patient not taking: Reported on 11/28/2023)   • Trintellix 10 MG tablet take 1 tablet by mouth once daily IN ADDITION TO 5 MG DAILY DOSING (Patient not taking: Reported on 5/17/2023)   • Trintellix 5 MG tablet take 1 tablet by mouth once daily IN ADDITION TO 10 MG ONCE DAILY DOSING (Patient not taking: Reported on 11/15/2022)   • valACYclovir (VALTREX) 1,000 mg tablet Take 2 tablets (2,000 mg total) by mouth 2 (two) times a day for 1 day     Allergies   Allergen Reactions   • Prochlorperazine Other (See Comments)     seizure,swollen tongue   • Rofecoxib Other (See Comments)     internal bleeding   • Erythromycin Base Other (See Comments)     seizure     Immunization History   Administered Date(s) Administered   • COVID-19 PFIZER VACCINE 0.3 ML IM 03/29/2021, 04/19/2021, 11/22/2021   • INFLUENZA 09/20/2016, 09/20/2016, 11/16/2017, 11/16/2017, 11/15/2022, 10/24/2023   • Influenza Split 09/17/2013   • Influenza, injectable, quadrivalent, preservative free 0.5 mL 09/20/2018, 10/24/2023, 11/28/2023   • Influenza, recombinant, quadrivalent,injectable, preservative free 11/21/2019, 10/20/2020, 01/06/2022, 11/15/2022   • Influenza, seasonal, injectable 07/02/2012   • Tdap 10/22/2003, 06/25/2017   • Zoster Vaccine Recombinant 07/09/2023, 10/03/2023     Objective     /84 (BP Location: Left arm, Patient Position:  "Sitting, Cuff Size: Large)   Pulse 78   Temp (!) 97.4 °F (36.3 °C) (Tympanic)   Resp 16   Ht 5' 2.5\" (1.588 m)   Wt 59.7 kg (131 lb 9.6 oz)   SpO2 98%   BMI 23.69 kg/m²     Physical Exam  Vitals and nursing note reviewed.   Constitutional:       Appearance: Normal appearance. She is well-developed.   HENT:      Head: Normocephalic and atraumatic.      Right Ear: Tympanic membrane, ear canal and external ear normal.      Left Ear: Tympanic membrane, ear canal and external ear normal.      Nose: Congestion present.      Mouth/Throat:      Pharynx: Posterior oropharyngeal erythema present.   Eyes:      Conjunctiva/sclera: Conjunctivae normal.   Cardiovascular:      Rate and Rhythm: Normal rate and regular rhythm.      Heart sounds: Normal heart sounds.   Pulmonary:      Effort: Pulmonary effort is normal.      Breath sounds: Normal breath sounds.   Musculoskeletal:         General: Normal range of motion.      Cervical back: Normal range of motion.   Skin:     General: Skin is warm and dry.   Neurological:      Mental Status: She is alert and oriented to person, place, and time.   Psychiatric:         Mood and Affect: Mood normal.         Behavior: Behavior normal.         "

## 2024-08-06 NOTE — ASSESSMENT & PLAN NOTE
- Prescription sent for doxycycline. Advised of side effects.   - Increase oral hydration and use humidifier.  - Contact office if symptoms do not improve.

## 2024-08-13 ENCOUNTER — CLINICAL SUPPORT (OUTPATIENT)
Dept: FAMILY MEDICINE CLINIC | Facility: CLINIC | Age: 64
End: 2024-08-13
Payer: MEDICARE

## 2024-08-13 DIAGNOSIS — E53.8 VITAMIN B 12 DEFICIENCY: Primary | ICD-10-CM

## 2024-08-13 PROCEDURE — 96372 THER/PROPH/DIAG INJ SC/IM: CPT

## 2024-08-13 RX ORDER — CYANOCOBALAMIN 1000 UG/ML
1000 INJECTION, SOLUTION INTRAMUSCULAR; SUBCUTANEOUS
Status: SHIPPED | OUTPATIENT
Start: 2024-08-13

## 2024-08-13 RX ADMIN — CYANOCOBALAMIN 1000 MCG: 1000 INJECTION, SOLUTION INTRAMUSCULAR; SUBCUTANEOUS at 13:45

## 2024-08-14 ENCOUNTER — TELEPHONE (OUTPATIENT)
Dept: GASTROENTEROLOGY | Facility: MEDICAL CENTER | Age: 64
End: 2024-08-14

## 2024-08-14 NOTE — TELEPHONE ENCOUNTER
Confirming Upcoming Procedure: Colonoscopy on 08/29/2024  Physician performing: Dr. Elias  Location of procedure:    Prep: [facility]

## 2024-08-26 ENCOUNTER — TELEPHONE (OUTPATIENT)
Dept: GASTROENTEROLOGY | Facility: CLINIC | Age: 64
End: 2024-08-26

## 2024-08-27 ENCOUNTER — TELEPHONE (OUTPATIENT)
Age: 64
End: 2024-08-27

## 2024-08-27 NOTE — TELEPHONE ENCOUNTER
Patients GI provider:  Dr. Elias    Number to return call: [phone]    Reason for call: Pt called needs [facility] prep instructions. Instructions were emailed to pt.Pt was not able to access instructions pt stated she will come by the office for instructions.    Scheduled procedure/appointment date if applicable: 8/29/2024

## 2024-08-28 RX ORDER — SODIUM CHLORIDE, SODIUM LACTATE, POTASSIUM CHLORIDE, CALCIUM CHLORIDE 600; 310; 30; 20 MG/100ML; MG/100ML; MG/100ML; MG/100ML
125 INJECTION, SOLUTION INTRAVENOUS CONTINUOUS
Status: CANCELLED | OUTPATIENT
Start: 2024-08-28

## 2024-08-29 ENCOUNTER — HOSPITAL ENCOUNTER (OUTPATIENT)
Dept: GASTROENTEROLOGY | Facility: HOSPITAL | Age: 64
Setting detail: OUTPATIENT SURGERY
End: 2024-08-29
Attending: STUDENT IN AN ORGANIZED HEALTH CARE EDUCATION/TRAINING PROGRAM
Payer: MEDICARE

## 2024-08-29 ENCOUNTER — ANESTHESIA EVENT (OUTPATIENT)
Dept: GASTROENTEROLOGY | Facility: HOSPITAL | Age: 64
End: 2024-08-29

## 2024-08-29 ENCOUNTER — ANESTHESIA (OUTPATIENT)
Dept: GASTROENTEROLOGY | Facility: HOSPITAL | Age: 64
End: 2024-08-29

## 2024-08-29 VITALS
RESPIRATION RATE: 14 BRPM | HEIGHT: 62 IN | HEART RATE: 82 BPM | TEMPERATURE: 98 F | SYSTOLIC BLOOD PRESSURE: 124 MMHG | WEIGHT: 131 LBS | DIASTOLIC BLOOD PRESSURE: 79 MMHG | OXYGEN SATURATION: 99 % | BODY MASS INDEX: 24.11 KG/M2

## 2024-08-29 DIAGNOSIS — D50.9 IRON DEFICIENCY ANEMIA, UNSPECIFIED IRON DEFICIENCY ANEMIA TYPE: ICD-10-CM

## 2024-08-29 DIAGNOSIS — K21.9 GERD WITHOUT ESOPHAGITIS: Primary | ICD-10-CM

## 2024-08-29 DIAGNOSIS — Z86.010 HISTORY OF COLON POLYPS: ICD-10-CM

## 2024-08-29 PROCEDURE — 45385 COLONOSCOPY W/LESION REMOVAL: CPT | Performed by: STUDENT IN AN ORGANIZED HEALTH CARE EDUCATION/TRAINING PROGRAM

## 2024-08-29 RX ORDER — FAMOTIDINE 40 MG/1
40 TABLET, FILM COATED ORAL
Qty: 30 TABLET | Refills: 11 | Status: SHIPPED | OUTPATIENT
Start: 2024-08-29

## 2024-08-29 RX ORDER — PROPOFOL 10 MG/ML
INJECTION, EMULSION INTRAVENOUS CONTINUOUS PRN
Status: DISCONTINUED | OUTPATIENT
Start: 2024-08-29 | End: 2024-08-29

## 2024-08-29 RX ORDER — PROPOFOL 10 MG/ML
INJECTION, EMULSION INTRAVENOUS AS NEEDED
Status: DISCONTINUED | OUTPATIENT
Start: 2024-08-29 | End: 2024-08-29

## 2024-08-29 RX ORDER — SODIUM CHLORIDE, SODIUM LACTATE, POTASSIUM CHLORIDE, CALCIUM CHLORIDE 600; 310; 30; 20 MG/100ML; MG/100ML; MG/100ML; MG/100ML
INJECTION, SOLUTION INTRAVENOUS CONTINUOUS PRN
Status: DISCONTINUED | OUTPATIENT
Start: 2024-08-29 | End: 2024-08-29

## 2024-08-29 RX ADMIN — SODIUM CHLORIDE, SODIUM LACTATE, POTASSIUM CHLORIDE, AND CALCIUM CHLORIDE: .6; .31; .03; .02 INJECTION, SOLUTION INTRAVENOUS at 09:21

## 2024-08-29 RX ADMIN — Medication 40 MG: at 09:35

## 2024-08-29 RX ADMIN — PROPOFOL 40 MG: 10 INJECTION, EMULSION INTRAVENOUS at 09:34

## 2024-08-29 RX ADMIN — PROPOFOL 120 MG: 10 INJECTION, EMULSION INTRAVENOUS at 09:27

## 2024-08-29 RX ADMIN — PROPOFOL 75 MCG/KG/MIN: 10 INJECTION, EMULSION INTRAVENOUS at 09:27

## 2024-08-29 NOTE — ANESTHESIA POSTPROCEDURE EVALUATION
Post-Op Assessment Note    CV Status:  Stable  Pain Score: 0    Pain management: adequate       Mental Status:  Alert and awake   Hydration Status:  Euvolemic   PONV Controlled:  Controlled   Airway Patency:  Patent     Post Op Vitals Reviewed: Yes    No anethesia notable event occurred.    Staff: CRNA               BP   134/67   Temp      Pulse  70   Resp   16   SpO2   97 ra

## 2024-08-29 NOTE — H&P
History and Physical -  [facility]  Grace Alcala 63 y.o. female MRN: [id number]                  HPI: Grace Alcala is a 63 y.o. year old female who presents for history of colon polyps, last colonoscopy with poor prep      REVIEW OF SYSTEMS: Per the HPI, and otherwise unremarkable.    Historical Information   Past Medical History:   Diagnosis Date    Bipolar affective disorder, depressed (HCC)     Colon polyp     GERD (gastroesophageal reflux disease)     Heart murmur     Osteoarthritis     Psychiatric disorder     Subdural hematoma (HCC)     Vitamin B12 deficiency      Past Surgical History:   Procedure Laterality Date    BREAST LUMPECTOMY Right     benign    CERVICAL SPINE SURGERY  2002    CHOLECYSTECTOMY  05/1985    FOOT FRACTURE SURGERY Right 2012    outcome - good    FOOT SURGERY Left     GASTRIC BYPASS  2004    x2- 2nd bypass 10/12    HYSTERECTOMY      age 45    KNEE SURGERY Right 01/30/2020     Social History   Social History     Substance and Sexual Activity   Alcohol Use Not Currently     Social History     Substance and Sexual Activity   Drug Use No     Social History     Tobacco Use   Smoking Status Never   Smokeless Tobacco Never   Tobacco Comments    passive smoke exposure     Family History   Problem Relation Age of Onset    No Known Problems Mother     Diabetes Father     Heart disease Father     No Known Problems Maternal Grandmother     No Known Problems Maternal Grandfather     No Known Problems Paternal Grandmother     No Known Problems Paternal Grandfather     No Known Problems Son     Colon cancer Maternal Uncle     Breast cancer Paternal Aunt     Breast cancer Paternal Aunt     Coronary artery disease Family     Heart attack Family     Breast cancer Half-Sister 66    Brain cancer Half-Sister     No Known Problems Half-Brother     No Known Problems Half-Brother        Meds/Allergies       Current Outpatient Medications:     acetaminophen (TYLENOL) 650 mg CR  tablet    ARIPiprazole (ABILIFY) 10 mg tablet    Azelastine HCl 137 MCG/SPRAY SOLN    buprenorphine (BUTRANS) 15 mcg/hr    buPROPion (WELLBUTRIN) 100 mg tablet    busPIRone (BUSPAR) 15 mg tablet    Caplyta 42 MG CAPS capsule    clotrimazole-betamethasone (LOTRISONE) 1-0.05 % cream    DULoxetine (CYMBALTA) 30 mg delayed release capsule    DULoxetine (CYMBALTA) 60 mg delayed release capsule    esomeprazole (NexIUM) 40 MG capsule    Fetzima 40 MG extended release capsule    gabapentin (NEURONTIN) 600 MG tablet    gabapentin (NEURONTIN) 800 mg tablet    hydrOXYzine HCL (ATARAX) 50 mg tablet    lamoTRIgine (LaMICtal) 25 mg tablet    levocetirizine (XYZAL) 5 MG tablet    LORazepam (ATIVAN) 0.5 mg tablet    neomycin-polymyxin-hydrocortisone (CORTISPORIN) 0.35%-10,000 units/mL-1% otic suspension    nystatin (MYCOSTATIN) cream    ondansetron (ZOFRAN) 4 mg tablet    oxyCODONE-acetaminophen (PERCOCET) 5-325 mg per tablet    pantoprazole (PROTONIX) 40 mg tablet    phentermine 37.5 MG capsule    polyethylene glycol (GOLYTELY) 4000 mL solution    QUEtiapine (SEROquel) 400 MG tablet    Rexulti 3 MG tablet    tiZANidine (ZANAFLEX) 4 mg tablet    topiramate (TOPAMAX) 100 mg tablet    traMADol (ULTRAM) 50 mg tablet    traZODone (DESYREL) 100 mg tablet    triamcinolone (KENALOG) 0.1 % cream    Trintellix 10 MG tablet    Trintellix 5 MG tablet    valACYclovir (VALTREX) 1,000 mg tablet    Current Facility-Administered Medications:     cyanocobalamin injection 1,000 mcg, 1,000 mcg, Intramuscular, Q30 Days, 1,000 mcg at 04/15/21 1415    cyanocobalamin injection 1,000 mcg, 1,000 mcg, Intramuscular, Q30 Days, 1,000 mcg at 05/18/21 1408    cyanocobalamin injection 1,000 mcg, 1,000 mcg, Intramuscular, Q30 Days, 1,000 mcg at 06/18/21 1325    cyanocobalamin injection 1,000 mcg, 1,000 mcg, Intramuscular, Q30 Days, 1,000 mcg at 08/31/21 1045    cyanocobalamin injection 1,000 mcg, 1,000 mcg, Intramuscular, Q30 Days, 1,000 mcg at 09/24/21 1157     cyanocobalamin injection 1,000 mcg, 1,000 mcg, Intramuscular, Q30 Days, 1,000 mcg at 10/27/21 1118    cyanocobalamin injection 1,000 mcg, 1,000 mcg, Intramuscular, Q30 Days, 1,000 mcg at 01/06/22 1359    cyanocobalamin injection 1,000 mcg, 1,000 mcg, Intramuscular, Q30 Days, 1,000 mcg at 02/03/22 1316    cyanocobalamin injection 1,000 mcg, 1,000 mcg, Intramuscular, Q30 Days, 1,000 mcg at 03/14/22 1323    cyanocobalamin injection 1,000 mcg, 1,000 mcg, Intramuscular, Q30 Days, 1,000 mcg at 04/19/22 1308    cyanocobalamin injection 1,000 mcg, 1,000 mcg, Intramuscular, Q30 Days, 1,000 mcg at 05/18/22 1508    cyanocobalamin injection 1,000 mcg, 1,000 mcg, Intramuscular, Q30 Days, 1,000 mcg at 06/20/22 1303    cyanocobalamin injection 1,000 mcg, 1,000 mcg, Intramuscular, Q30 Days, 1,000 mcg at 07/22/22 1314    cyanocobalamin injection 1,000 mcg, 1,000 mcg, Intramuscular, Q30 Days, 1,000 mcg at 08/22/22 1311    cyanocobalamin injection 1,000 mcg, 1,000 mcg, Intramuscular, Q30 Days, 1,000 mcg at 09/19/22 1324    cyanocobalamin injection 1,000 mcg, 1,000 mcg, Intramuscular, Q30 Days, 1,000 mcg at 10/24/22 1325    cyanocobalamin injection 1,000 mcg, 1,000 mcg, Intramuscular, Q30 Days, 1,000 mcg at 11/28/22 1442    cyanocobalamin injection 1,000 mcg, 1,000 mcg, Intramuscular, Q30 Days, 1,000 mcg at 12/28/22 1404    cyanocobalamin injection 1,000 mcg, 1,000 mcg, Intramuscular, Q30 Days, 1,000 mcg at 01/30/23 1453    cyanocobalamin injection 1,000 mcg, 1,000 mcg, Intramuscular, Q30 Days, 1,000 mcg at 03/03/23 1333    cyanocobalamin injection 1,000 mcg, 1,000 mcg, Intramuscular, Q30 Days, 1,000 mcg at 04/11/23 1314    cyanocobalamin injection 1,000 mcg, 1,000 mcg, Intramuscular, Q30 Days, 1,000 mcg at 05/17/23 1321    cyanocobalamin injection 1,000 mcg, 1,000 mcg, Intramuscular, Q30 Days, 1,000 mcg at 06/19/23 1305    cyanocobalamin injection 1,000 mcg, 1,000 mcg, Intramuscular, Q30 Days, 1,000 mcg at 07/21/23 1311     "cyanocobalamin injection 1,000 mcg, 1,000 mcg, Intramuscular, Q30 Days, 1,000 mcg at 08/24/23 1115    cyanocobalamin injection 1,000 mcg, 1,000 mcg, Intramuscular, Q30 Days, 1,000 mcg at 09/25/23 1249    cyanocobalamin injection 1,000 mcg, 1,000 mcg, Intramuscular, Q30 Days, 1,000 mcg at 10/24/23 1208    cyanocobalamin injection 1,000 mcg, 1,000 mcg, Intramuscular, Q30 Days    cyanocobalamin injection 1,000 mcg, 1,000 mcg, Intramuscular, Q30 Days, 1,000 mcg at 11/28/23 1327    cyanocobalamin injection 1,000 mcg, 1,000 mcg, Intramuscular, Q30 Days, 1,000 mcg at 12/28/23 1320    cyanocobalamin injection 1,000 mcg, 1,000 mcg, Intramuscular, Q30 Days, 1,000 mcg at 02/02/24 1508    cyanocobalamin injection 1,000 mcg, 1,000 mcg, Intramuscular, Q30 Days, 1,000 mcg at 03/04/24 1434    cyanocobalamin injection 1,000 mcg, 1,000 mcg, Intramuscular, Q30 Days, 1,000 mcg at 04/04/24 1322    cyanocobalamin injection 1,000 mcg, 1,000 mcg, Intramuscular, Q30 Days, 1,000 mcg at 05/07/24 1322    cyanocobalamin injection 1,000 mcg, 1,000 mcg, Intramuscular, Q30 Days, 1,000 mcg at 06/07/24 1326    cyanocobalamin injection 1,000 mcg, 1,000 mcg, Intramuscular, Q30 Days, 1,000 mcg at 07/09/24 1403    cyanocobalamin injection 1,000 mcg, 1,000 mcg, Intramuscular, Q30 Days, 1,000 mcg at 08/13/24 1345    Allergies   Allergen Reactions    Prochlorperazine Other (See Comments)     seizure,swollen tongue    Rofecoxib Other (See Comments)     internal bleeding    Erythromycin Base Other (See Comments)     seizure       Objective     /85   Pulse 78   Temp 98.4 °F (36.9 °C) (Temporal)   Resp 16   Ht 5' 2\" (1.575 m)   Wt 59.4 kg (131 lb)   SpO2 99%   BMI 23.96 kg/m²         PHYSICAL EXAM    Gen: NAD  Head: NCAT  CV: RRR  CHEST: Clear  ABD: soft, NT/ND  EXT: no edema      ASSESSMENT/PLAN:  This is a 63 y.o. year old female here for colonoscopy, and she is stable and optimized for her procedure.        "

## 2024-08-29 NOTE — ANESTHESIA PREPROCEDURE EVALUATION
Procedure:  COLONOSCOPY    Relevant Problems   CARDIO   (+) Mixed hyperlipidemia      GI/HEPATIC   (+) GERD without esophagitis      HEMATOLOGY   (+) Anemia   (+) Iron deficiency anemia secondary to inadequate dietary iron intake      MUSCULOSKELETAL   (+) Cervical spondylosis   (+) Chronic bilateral low back pain with sciatica      NEURO/PSYCH   (+) Chronic bilateral low back pain with sciatica   (+) Numbness and tingling of both feet        Physical Exam    Airway    Mallampati score: II  TM Distance: <3 FB  Neck ROM: full     Dental   No notable dental hx     Cardiovascular  Cardiovascular exam normal    Pulmonary  Pulmonary exam normal     Other Findings  post-pubertal.      Anesthesia Plan  ASA Score- 2     Anesthesia Type- IV sedation with anesthesia with ASA Monitors.         Additional Monitors:     Airway Plan:            Plan Factors-Exercise tolerance (METS): <4 METS.    Chart reviewed.   Existing labs reviewed.     Patient is not a current smoker. Patient not instructed to abstain from smoking on day of procedure. Patient did not smoke on day of surgery.            Induction- intravenous.    Postoperative Plan-         Informed Consent- Anesthetic plan and risks discussed with patient.  I personally reviewed this patient with the CRNA. Discussed and agreed on the Anesthesia Plan with the CRNA..

## 2024-09-05 PROBLEM — J01.00 ACUTE NON-RECURRENT MAXILLARY SINUSITIS: Status: RESOLVED | Noted: 2024-08-06 | Resolved: 2024-09-05

## 2024-09-06 ENCOUNTER — OFFICE VISIT (OUTPATIENT)
Dept: FAMILY MEDICINE CLINIC | Facility: CLINIC | Age: 64
End: 2024-09-06
Payer: MEDICARE

## 2024-09-06 VITALS
RESPIRATION RATE: 16 BRPM | OXYGEN SATURATION: 98 % | BODY MASS INDEX: 23.63 KG/M2 | WEIGHT: 128.4 LBS | HEART RATE: 81 BPM | HEIGHT: 62 IN | TEMPERATURE: 97.8 F | SYSTOLIC BLOOD PRESSURE: 126 MMHG | DIASTOLIC BLOOD PRESSURE: 70 MMHG

## 2024-09-06 DIAGNOSIS — J02.9 SORE THROAT: ICD-10-CM

## 2024-09-06 DIAGNOSIS — H92.01 RIGHT EAR PAIN: Primary | ICD-10-CM

## 2024-09-06 LAB — S PYO AG THROAT QL: NEGATIVE

## 2024-09-06 PROCEDURE — 99213 OFFICE O/P EST LOW 20 MIN: CPT | Performed by: NURSE PRACTITIONER

## 2024-09-06 PROCEDURE — 87636 SARSCOV2 & INF A&B AMP PRB: CPT | Performed by: NURSE PRACTITIONER

## 2024-09-06 PROCEDURE — 87880 STREP A ASSAY W/OPTIC: CPT | Performed by: NURSE PRACTITIONER

## 2024-09-06 PROCEDURE — 87070 CULTURE OTHR SPECIMN AEROBIC: CPT | Performed by: NURSE PRACTITIONER

## 2024-09-06 RX ORDER — PREDNISONE 50 MG/1
50 TABLET ORAL DAILY
Qty: 5 TABLET | Refills: 0 | Status: SHIPPED | OUTPATIENT
Start: 2024-09-06 | End: 2024-09-11

## 2024-09-06 RX ORDER — OXYBUTYNIN CHLORIDE 5 MG/1
5 TABLET ORAL 2 TIMES DAILY
COMMUNITY
Start: 2024-08-12

## 2024-09-06 NOTE — PROGRESS NOTES
Ambulatory Visit  Name: Grace Alcala      : 1960      MRN: [id number]  Encounter Provider: WHITNEY Restrepo  Encounter Date: 2024   Encounter department: [facility]    Assessment & Plan   1. Right ear pain  Assessment & Plan:  - Prescription sent for prednisone. Advised of side effects.   - Continue Flonase.   Orders:  -     predniSONE 50 mg tablet; Take 1 tablet (50 mg total) by mouth daily for 5 days  2. Sore throat  Assessment & Plan:  - Discussed supportive care.   - Will test for COVID, flu, and strep and continue to follow up.   Orders:  -     POCT rapid ANTIGEN strepA  -     Covid/Flu- Office Collect Normal; Future  -     Covid/Flu- Office Collect Normal       History of Present Illness     Patient presents to office today with complaints of sore throat and right ear pain. Has been occurring for the last 4 days. She does state it is getting better. She has been drinking hot tea and using throat spray. She denies any cough, fever, chills, or congestion. She took a home COVID test but noticed it was  by 1 year. Denies any other concerns or complaints today.         Review of Systems   Constitutional:  Negative for fatigue and fever.   HENT:  Positive for ear pain (right) and sore throat. Negative for congestion, sinus pressure, sinus pain and trouble swallowing.    Eyes:  Negative for visual disturbance.   Respiratory:  Negative for cough and shortness of breath.    Cardiovascular:  Negative for chest pain and palpitations.   Gastrointestinal:  Negative for abdominal pain and blood in stool.   Endocrine: Negative for cold intolerance and heat intolerance.   Genitourinary:  Negative for difficulty urinating and dysuria.   Musculoskeletal:  Negative for gait problem.   Skin:  Negative for rash.   Neurological:  Negative for dizziness, syncope and headaches.   Hematological:  Negative for adenopathy.   Psychiatric/Behavioral:  Negative for behavioral  problems.      Past Medical History:   Diagnosis Date   • Bipolar affective disorder, depressed (HCC)    • Colon polyp    • GERD (gastroesophageal reflux disease)    • Heart murmur    • Osteoarthritis    • Psychiatric disorder    • Subdural hematoma (HCC)    • Vitamin B12 deficiency      Past Surgical History:   Procedure Laterality Date   • BREAST LUMPECTOMY Right     benign   • CERVICAL SPINE SURGERY  2002   • CHOLECYSTECTOMY  05/1985   • FOOT FRACTURE SURGERY Right 2012    outcome - good   • FOOT SURGERY Left    • GASTRIC BYPASS  2004    x2- 2nd bypass 10/12   • HYSTERECTOMY      age 45   • KNEE SURGERY Right 01/30/2020     Family History   Problem Relation Age of Onset   • No Known Problems Mother    • Diabetes Father    • Heart disease Father    • No Known Problems Maternal Grandmother    • No Known Problems Maternal Grandfather    • No Known Problems Paternal Grandmother    • No Known Problems Paternal Grandfather    • No Known Problems Son    • Colon cancer Maternal Uncle    • Breast cancer Paternal Aunt    • Breast cancer Paternal Aunt    • Coronary artery disease Family    • Heart attack Family    • Breast cancer Half-Sister 66   • Brain cancer Half-Sister    • No Known Problems Half-Brother    • No Known Problems Half-Brother      Social History     Tobacco Use   • Smoking status: Never   • Smokeless tobacco: Never   • Tobacco comments:     passive smoke exposure   Vaping Use   • Vaping status: Never Used   Substance and Sexual Activity   • Alcohol use: Not Currently   • Drug use: No   • Sexual activity: Not on file     Current Outpatient Medications on File Prior to Visit   Medication Sig   • Azelastine HCl 137 MCG/SPRAY SOLN instill 1 spray into each nostril twice a day   • buPROPion (WELLBUTRIN) 100 mg tablet Take 200 mg by mouth 2 (two) times a day   • Caplyta 42 MG CAPS capsule Take 42 mg by mouth daily   • clotrimazole-betamethasone (LOTRISONE) 1-0.05 % cream Apply topically 2 (two) times a day   •  esomeprazole (NexIUM) 40 MG capsule Take 40 mg by mouth every morning before breakfast   • famotidine (PEPCID) 40 MG tablet Take 1 tablet (40 mg total) by mouth daily at bedtime   • Fetzima 40 MG extended release capsule Take 40 mg by mouth daily   • hydrOXYzine HCL (ATARAX) 50 mg tablet Take 50 mg by mouth daily at bedtime   • lamoTRIgine (LaMICtal) 25 mg tablet Take 25 mg by mouth daily   • levocetirizine (XYZAL) 5 MG tablet Take 1 tablet (5 mg total) by mouth every evening   • neomycin-polymyxin-hydrocortisone (CORTISPORIN) 0.35%-10,000 units/mL-1% otic suspension Administer 4 drops into the left ear 4 (four) times a day   • oxybutynin (DITROPAN) 5 mg tablet Take 5 mg by mouth 2 (two) times a day   • pantoprazole (PROTONIX) 40 mg tablet Take 1 tablet (40 mg total) by mouth daily   • phentermine 37.5 MG capsule Take 37.5 mg by mouth every morning   • QUEtiapine (SEROquel) 400 MG tablet Take 800 mg by mouth daily at bedtime   • tiZANidine (ZANAFLEX) 4 mg tablet Take 4 mg by mouth 3 (three) times a day   • traMADol (ULTRAM) 50 mg tablet Take 1 tablet daily as needed   • triamcinolone (KENALOG) 0.1 % cream Apply topically 2 (two) times a day   • acetaminophen (TYLENOL) 650 mg CR tablet 1 as directed for pain (Patient not taking: Reported on 10/10/2023)   • ARIPiprazole (ABILIFY) 10 mg tablet  (Patient not taking: Reported on 10/10/2023)   • buprenorphine (BUTRANS) 15 mcg/hr Place 1 patch on the skin once a week (Patient not taking: Reported on 8/6/2024)   • busPIRone (BUSPAR) 15 mg tablet Take 15 mg by mouth 2 (two) times a day (Patient not taking: Reported on 11/28/2023)   • DULoxetine (CYMBALTA) 30 mg delayed release capsule  (Patient not taking: Reported on 11/28/2023)   • DULoxetine (CYMBALTA) 60 mg delayed release capsule Take 120 mg by mouth daily (Patient not taking: Reported on 11/28/2023)   • gabapentin (NEURONTIN) 600 MG tablet Take 1,200 mg by mouth daily at bedtime (Patient not taking: Reported on  5/17/2023)   • gabapentin (NEURONTIN) 800 mg tablet Take 1,600 mg by mouth daily at bedtime  (Patient not taking: Reported on 3/14/2022)   • LORazepam (ATIVAN) 0.5 mg tablet Take 0.5 mg by mouth daily as needed (Patient not taking: Reported on 11/28/2023)   • nystatin (MYCOSTATIN) cream Apply topically 2 (two) times a day (Patient not taking: Reported on 11/28/2023)   • ondansetron (ZOFRAN) 4 mg tablet Take 1 tablet (4 mg total) by mouth every 8 (eight) hours as needed for nausea or vomiting (Patient not taking: Reported on 11/28/2023)   • oxyCODONE-acetaminophen (PERCOCET) 5-325 mg per tablet Take 1 tablet by mouth every 6 (six) hours as needed   (Patient not taking: Reported on 10/10/2023)   • Rexulti 3 MG tablet Take 3 mg by mouth daily (Patient not taking: Reported on 10/10/2023)   • topiramate (TOPAMAX) 100 mg tablet Take 100 mg by mouth 2 (two) times a day (Patient not taking: Reported on 8/29/2024)   • traZODone (DESYREL) 100 mg tablet Take 100 mg by mouth daily at bedtime (Patient not taking: Reported on 11/28/2023)   • Trintellix 10 MG tablet take 1 tablet by mouth once daily IN ADDITION TO 5 MG DAILY DOSING (Patient not taking: Reported on 5/17/2023)   • Trintellix 5 MG tablet take 1 tablet by mouth once daily IN ADDITION TO 10 MG ONCE DAILY DOSING (Patient not taking: Reported on 11/15/2022)   • valACYclovir (VALTREX) 1,000 mg tablet Take 2 tablets (2,000 mg total) by mouth 2 (two) times a day for 1 day     Allergies   Allergen Reactions   • Prochlorperazine Other (See Comments)     seizure,swollen tongue   • Rofecoxib Other (See Comments)     internal bleeding   • Erythromycin Base Other (See Comments)     seizure     Immunization History   Administered Date(s) Administered   • COVID-19 PFIZER VACCINE 0.3 ML IM 03/29/2021, 04/19/2021, 11/22/2021   • INFLUENZA 09/20/2016, 09/20/2016, 11/16/2017, 11/16/2017, 11/15/2022, 10/24/2023   • Influenza Split 09/17/2013   • Influenza, injectable, quadrivalent,  "preservative free 0.5 mL 09/20/2018, 10/24/2023, 11/28/2023   • Influenza, recombinant, quadrivalent,injectable, preservative free 11/21/2019, 10/20/2020, 01/06/2022, 11/15/2022   • Influenza, seasonal, injectable 07/02/2012   • Tdap 10/22/2003, 06/25/2017   • Zoster Vaccine Recombinant 07/09/2023, 10/03/2023     Objective     /70 (BP Location: Left arm, Patient Position: Sitting, Cuff Size: Large)   Pulse 81   Temp 97.8 °F (36.6 °C) (Tympanic)   Resp 16   Ht 5' 2\" (1.575 m)   Wt 58.2 kg (128 lb 6.4 oz)   SpO2 98%   BMI 23.48 kg/m²     Physical Exam    "

## 2024-09-08 LAB — BACTERIA THROAT CULT: NORMAL

## 2024-09-09 LAB
FLUAV RNA RESP QL NAA+PROBE: NEGATIVE
FLUBV RNA RESP QL NAA+PROBE: NEGATIVE
SARS-COV-2 RNA RESP QL NAA+PROBE: NEGATIVE

## 2024-09-14 DIAGNOSIS — K21.9 GERD WITHOUT ESOPHAGITIS: ICD-10-CM

## 2024-09-16 ENCOUNTER — CLINICAL SUPPORT (OUTPATIENT)
Dept: FAMILY MEDICINE CLINIC | Facility: CLINIC | Age: 64
End: 2024-09-16
Payer: MEDICARE

## 2024-09-16 DIAGNOSIS — E53.8 VITAMIN B 12 DEFICIENCY: Primary | ICD-10-CM

## 2024-09-16 PROCEDURE — 96372 THER/PROPH/DIAG INJ SC/IM: CPT

## 2024-09-16 RX ORDER — PANTOPRAZOLE SODIUM 40 MG/1
40 TABLET, DELAYED RELEASE ORAL DAILY
Qty: 90 TABLET | Refills: 1 | Status: SHIPPED | OUTPATIENT
Start: 2024-09-16

## 2024-09-16 RX ORDER — CYANOCOBALAMIN 1000 UG/ML
1000 INJECTION, SOLUTION INTRAMUSCULAR; SUBCUTANEOUS
Status: SHIPPED | OUTPATIENT
Start: 2024-09-16

## 2024-09-16 RX ADMIN — CYANOCOBALAMIN 1000 MCG: 1000 INJECTION, SOLUTION INTRAMUSCULAR; SUBCUTANEOUS at 14:46

## 2024-09-28 DIAGNOSIS — J30.1 SEASONAL ALLERGIC RHINITIS DUE TO POLLEN: ICD-10-CM

## 2024-09-28 RX ORDER — AZELASTINE HYDROCHLORIDE 137 UG/1
1 SPRAY, METERED NASAL 2 TIMES DAILY
Qty: 90 ML | Refills: 5 | Status: SHIPPED | OUTPATIENT
Start: 2024-09-28

## 2024-10-08 ENCOUNTER — TELEPHONE (OUTPATIENT)
Dept: FAMILY MEDICINE CLINIC | Facility: CLINIC | Age: 64
End: 2024-10-08

## 2024-10-08 DIAGNOSIS — K21.9 GERD WITHOUT ESOPHAGITIS: ICD-10-CM

## 2024-10-08 NOTE — TELEPHONE ENCOUNTER
Medication is not on current med list. Patient states that she is still having issues with acid reflux so she has been taking both the pantoprazole and the omeprazole.     Reason for call:   [x] Refill   [] Prior Auth  [] Other:     Office:   [x] PCP/Provider -   [] Specialty/Provider -     Medication:   Omeprazole 40mg- take 1 capsule by mouth daily    Pharmacy: [facility]    Does the patient have enough for 3 days?   [] Yes   [x] No - Send as HP to POD

## 2024-10-09 RX ORDER — FAMOTIDINE 40 MG/1
40 TABLET, FILM COATED ORAL
Qty: 90 TABLET | Refills: 1 | Status: SHIPPED | OUTPATIENT
Start: 2024-10-09

## 2024-10-09 NOTE — TELEPHONE ENCOUNTER
LM for pt to call office back.  It looks like pt GI doctor just sent Pepcid to the pharmacy for her and sent pantoprazole on 9/16/24.   Pt did just have colonoscopy done on 8/29/24.     I spoke to Gabrielle and she stated Pt needs to address this with her GI doctor.

## 2024-10-09 NOTE — TELEPHONE ENCOUNTER
Tell her to take only one. Either the pantoprazole or the omeprazole. She can take it twice daily. I do recommend referral to GI.

## 2024-10-18 ENCOUNTER — CLINICAL SUPPORT (OUTPATIENT)
Dept: FAMILY MEDICINE CLINIC | Facility: CLINIC | Age: 64
End: 2024-10-18
Payer: MEDICARE

## 2024-10-18 DIAGNOSIS — E53.8 VITAMIN B 12 DEFICIENCY: Primary | ICD-10-CM

## 2024-10-18 PROCEDURE — 96372 THER/PROPH/DIAG INJ SC/IM: CPT

## 2024-10-18 RX ORDER — CYANOCOBALAMIN 1000 UG/ML
1000 INJECTION, SOLUTION INTRAMUSCULAR; SUBCUTANEOUS
Status: SHIPPED | OUTPATIENT
Start: 2024-10-18

## 2024-10-18 RX ADMIN — CYANOCOBALAMIN 1000 MCG: 1000 INJECTION, SOLUTION INTRAMUSCULAR; SUBCUTANEOUS at 13:34

## 2024-11-19 ENCOUNTER — CLINICAL SUPPORT (OUTPATIENT)
Dept: FAMILY MEDICINE CLINIC | Facility: CLINIC | Age: 64
End: 2024-11-19
Payer: MEDICARE

## 2024-11-19 DIAGNOSIS — E53.8 VITAMIN B 12 DEFICIENCY: Primary | ICD-10-CM

## 2024-11-19 PROCEDURE — 96372 THER/PROPH/DIAG INJ SC/IM: CPT

## 2024-11-19 RX ORDER — CYANOCOBALAMIN 1000 UG/ML
1000 INJECTION, SOLUTION INTRAMUSCULAR; SUBCUTANEOUS
Status: SHIPPED | OUTPATIENT
Start: 2024-11-19

## 2024-11-19 RX ADMIN — CYANOCOBALAMIN 1000 MCG: 1000 INJECTION, SOLUTION INTRAMUSCULAR; SUBCUTANEOUS at 14:14

## 2024-11-24 DIAGNOSIS — J30.1 SEASONAL ALLERGIC RHINITIS DUE TO POLLEN: ICD-10-CM

## 2024-11-26 RX ORDER — LEVOCETIRIZINE DIHYDROCHLORIDE 5 MG/1
5 TABLET, FILM COATED ORAL EVERY EVENING
Qty: 90 TABLET | Refills: 1 | Status: SHIPPED | OUTPATIENT
Start: 2024-11-26

## 2024-12-03 ENCOUNTER — OFFICE VISIT (OUTPATIENT)
Dept: FAMILY MEDICINE CLINIC | Facility: CLINIC | Age: 64
End: 2024-12-03
Payer: MEDICARE

## 2024-12-03 VITALS
SYSTOLIC BLOOD PRESSURE: 124 MMHG | BODY MASS INDEX: 22.93 KG/M2 | HEART RATE: 83 BPM | WEIGHT: 124.6 LBS | DIASTOLIC BLOOD PRESSURE: 82 MMHG | TEMPERATURE: 96.6 F | RESPIRATION RATE: 16 BRPM | OXYGEN SATURATION: 97 % | HEIGHT: 62 IN

## 2024-12-03 DIAGNOSIS — Z00.00 MEDICARE ANNUAL WELLNESS VISIT, SUBSEQUENT: ICD-10-CM

## 2024-12-03 DIAGNOSIS — K21.9 GERD WITHOUT ESOPHAGITIS: ICD-10-CM

## 2024-12-03 DIAGNOSIS — E78.2 MIXED HYPERLIPIDEMIA: Primary | ICD-10-CM

## 2024-12-03 DIAGNOSIS — F31.81 BIPOLAR 2 DISORDER (HCC): ICD-10-CM

## 2024-12-03 DIAGNOSIS — E53.8 VITAMIN B 12 DEFICIENCY: ICD-10-CM

## 2024-12-03 DIAGNOSIS — Z23 ENCOUNTER FOR IMMUNIZATION: ICD-10-CM

## 2024-12-03 DIAGNOSIS — Z12.31 ENCOUNTER FOR SCREENING MAMMOGRAM FOR MALIGNANT NEOPLASM OF BREAST: ICD-10-CM

## 2024-12-03 DIAGNOSIS — E55.9 VITAMIN D INSUFFICIENCY: ICD-10-CM

## 2024-12-03 PROCEDURE — G0439 PPPS, SUBSEQ VISIT: HCPCS | Performed by: NURSE PRACTITIONER

## 2024-12-03 PROCEDURE — 90673 RIV3 VACCINE NO PRESERV IM: CPT

## 2024-12-03 PROCEDURE — 90471 IMMUNIZATION ADMIN: CPT

## 2024-12-03 PROCEDURE — 99214 OFFICE O/P EST MOD 30 MIN: CPT | Performed by: NURSE PRACTITIONER

## 2024-12-03 PROCEDURE — G0444 DEPRESSION SCREEN ANNUAL: HCPCS | Performed by: NURSE PRACTITIONER

## 2024-12-03 RX ORDER — LAMOTRIGINE 100 MG/1
100 TABLET ORAL
COMMUNITY
Start: 2024-11-05

## 2024-12-03 NOTE — ASSESSMENT & PLAN NOTE
- Well controlled.  - Encourage healthy diet and regular exercise.  - Will continue to monitor fasting lipid panel.     Orders:    Lipid Panel with Direct LDL reflex; Future     Show Aperture Variable?: Yes Detail Level: Detailed Render Post-Care Instructions In Note?: no Consent: The patient's consent was obtained including but not limited to risks of crusting, scabbing, blistering, scarring, darker or lighter pigmentary change, recurrence, incomplete removal and infection. Number Of Freeze-Thaw Cycles: 2 freeze-thaw cycles Duration Of Freeze Thaw-Cycle (Seconds): 3 Post-Care Instructions: I reviewed with the patient in detail post-care instructions. Patient is to wear sunprotection, and avoid picking at any of the treated lesions. Pt may apply Vaseline to crusted or scabbing areas.

## 2024-12-03 NOTE — PROGRESS NOTES
Name: Grace Alcala      : 1960      MRN: [id number]  Encounter Provider: WHITNEY Restrepo  Encounter Date: 12/3/2024   Encounter department: [facility]    Assessment & Plan  Mixed hyperlipidemia  - Well controlled.  - Encourage healthy diet and regular exercise.  - Will continue to monitor fasting lipid panel.     Orders:    Lipid Panel with Direct LDL reflex; Future    GERD without esophagitis  - Stable on pantoprazole 40 mg daily. Continue same.   - Avoid triggering food and beverage.  - Continue routine follow up with GI.         Bipolar 2 disorder (HCC)  - Stable on current medication regimen.  - Continue routine follow up with Psychiatrist.          Vitamin B 12 deficiency  - Stable.  - Will continue to monitor B12 level.     Orders:    Vitamin B12; Future    Vitamin D insufficiency    Orders:    Vitamin D 25 hydroxy; Future    Medicare annual wellness visit, subsequent  - Reviewed immunizations and screenings.  - UTD with colonoscopy.  - Due for mammogram.  - Flu shot given in office today.  Orders:    CBC and differential; Future    Comprehensive metabolic panel; Future    Lipid Panel with Direct LDL reflex; Future    TSH, 3rd generation with Free T4 reflex; Future    Encounter for screening mammogram for malignant neoplasm of breast    Orders:    Mammo screening bilateral w 3d and cad; Future    Encounter for immunization    Orders:    influenza vaccine, recombinant, PF, 0.5 mL IM (Flublok)       Preventive health issues were discussed with patient, and age appropriate screening tests were ordered as noted in patient's After Visit Summary. Personalized health advice and appropriate referrals for health education or preventive services given if needed, as noted in patient's After Visit Summary.    History of Present Illness     Patient with PMH of GERD, anemia, HLD, B12 deficiency, and bipolar disorder presents to office today for annual physical. She is taking  her prescribed medication and reports no side effects. She is due for mammogram. She would like the flu shot today. She denies any concerns or complaints.           Patient Care Team:  WHITNEY Restrepo as PCP - General (Nurse Practitioner)    Review of Systems   Constitutional:  Negative for fatigue and fever.   HENT:  Negative for congestion, rhinorrhea and trouble swallowing.    Eyes:  Negative for visual disturbance.   Respiratory:  Negative for cough and shortness of breath.    Cardiovascular:  Negative for chest pain and palpitations.   Gastrointestinal:  Negative for abdominal pain and blood in stool.   Endocrine: Negative for cold intolerance and heat intolerance.   Genitourinary:  Negative for difficulty urinating, dysuria and frequency.   Musculoskeletal:  Negative for gait problem.   Skin:  Negative for rash.   Neurological:  Negative for dizziness, syncope and headaches.   Hematological:  Negative for adenopathy.   Psychiatric/Behavioral:  Negative for behavioral problems.      Medical History Reviewed by provider this encounter:  Tobacco  Allergies  Meds  Problems  Med Hx  Surg Hx  Fam Hx       Annual Wellness Visit Questionnaire   Grace is here for her Subsequent Wellness visit. Last Medicare Wellness visit information reviewed, patient interviewed and updates made to the record today.      Health Risk Assessment:   Patient rates overall health as fair. Patient feels that their physical health rating is same. Patient is dissatisfied with their life. Eyesight was rated as same. Hearing was rated as same. Patient feels that their emotional and mental health rating is same. Patients states they are never, rarely angry. Pain experienced in the last 7 days has been some. Patient's pain rating has been 5/10. Patient states that she has experienced no weight loss or gain in last 6 months.     Depression Screening:   PHQ-2 Score: 2      Fall Risk Screening:   In the past year, patient has  "experienced: history of falling in past year    Number of falls: 2 or more  Injured during fall?: No    Feels unsteady when standing or walking?: Yes    Worried about falling?: No      Urinary Incontinence Screening:   Patient has leaked urine accidently in the last six months.     Home Safety:  Patient does not have trouble with stairs inside or outside of their home. Patient has working smoke alarms and has no working carbon monoxide detector. Home safety hazards include: none.     Nutrition:   Current diet is Regular.     Medications:   Patient is currently taking over-the-counter supplements. OTC medications include: see medication list. Patient is able to manage medications.     Activities of Daily Living (ADLs)/Instrumental Activities of Daily Living (IADLs):   Walk and transfer into and out of bed and chair?: Yes  Dress and groom yourself?: Yes    Bathe or shower yourself?: Yes    Feed yourself? Yes  Do your laundry/housekeeping?: Yes  Manage your money, pay your bills and track your expenses?: Yes  Make your own meals?: Yes    Do your own shopping?: Yes    ADL comments: 1. \"Trouble carrying things up and down steps cannot do\"  2. \"Vacuuming is difficult loose balance.\"    Previous Hospitalizations:   Any hospitalizations or ED visits within the last 12 months?: Yes    How many hospitalizations have you had in the last year?: 1-2    Advance Care Planning:   Living will: No    Durable POA for healthcare: No    Advanced directive: No      Cognitive Screening:   Provider or family/friend/caregiver concerned regarding cognition?: No    PREVENTIVE SCREENINGS      Cardiovascular Screening:    General: Screening Not Indicated and History Lipid Disorder      Diabetes Screening:     General: Screening Current      Colorectal Cancer Screening:     General: Screening Current      Breast Cancer Screening:     General: Screening Current      Cervical Cancer Screening:    General: Patient Declines      Osteoporosis " Screening:    General: Patient Declines      Abdominal Aortic Aneurysm (AAA) Screening:        General: Screening Not Indicated      Lung Cancer Screening:     General: Screening Not Indicated      Hepatitis C Screening:    General: Screening Current    Screening, Brief Intervention, and Referral to Treatment (SBIRT)    Screening  Typical number of drinks in a day: 0  Typical number of drinks in a week: 0  Interpretation: Low risk drinking behavior.    Single Item Drug Screening:  How often have you used an illegal drug (including marijuana) or a prescription medication for non-medical reasons in the past year? never    Single Item Drug Screen Score: 0  Interpretation: Negative screen for possible drug use disorder    Annual Depression Screening  Time spent screening and evaluating the patient for depression during today's encounter was 5 minutes.    Other Counseling Topics:   Calcium and vitamin D intake and regular weightbearing exercise.     Social Drivers of Health     Financial Resource Strain: Medium Risk (5/7/2023)    [facility]    Overall Financial Resource Strain (CARDIA)     Difficulty of Paying Living Expenses: Somewhat hard   Food Insecurity: No Food Insecurity (5/7/2023)    [facility] from [facility], [facility]    Hunger Vital Sign     Worried About Running Out of Food in the Last Year: Never true     Ran Out of Food in the Last Year: Never true   Transportation Needs: No Transportation Needs (5/7/2023)    [facility] from [facility], [facility]    PRAPARE - Transportation     Lack of Transportation (Medical): No     Lack of Transportation (Non-Medical): No   Housing Stability: Low Risk  (5/7/2023)    Received from [facility], [facility]    Housing Stability Vital Sign     Unable to Pay for Housing in the Last Year: No     Number of Places  "Lived in the Last Year: 1     Unstable Housing in the Last Year: No     No results found.    Objective   /82 (BP Location: Left arm, Patient Position: Sitting, Cuff Size: Standard)   Pulse 83   Temp (!) 96.6 °F (35.9 °C) (Tympanic)   Resp 16   Ht 5' 2\" (1.575 m)   Wt 56.5 kg (124 lb 9.6 oz)   SpO2 97%   BMI 22.79 kg/m²     Physical Exam  Vitals and nursing note reviewed.   Constitutional:       General: She is not in acute distress.     Appearance: Normal appearance. She is well-developed. She is not ill-appearing.   HENT:      Head: Normocephalic and atraumatic.      Right Ear: Tympanic membrane, ear canal and external ear normal.      Left Ear: Tympanic membrane, ear canal and external ear normal.      Nose: Nose normal.      Mouth/Throat:      Mouth: Mucous membranes are moist.      Pharynx: No posterior oropharyngeal erythema.   Eyes:      Conjunctiva/sclera: Conjunctivae normal.      Pupils: Pupils are equal, round, and reactive to light.   Cardiovascular:      Rate and Rhythm: Normal rate and regular rhythm.      Heart sounds: Normal heart sounds.   Pulmonary:      Effort: Pulmonary effort is normal.      Breath sounds: Normal breath sounds.   Abdominal:      General: Bowel sounds are normal.      Palpations: Abdomen is soft.   Musculoskeletal:         General: Normal range of motion.      Cervical back: Normal range of motion.   Lymphadenopathy:      Cervical: No cervical adenopathy.   Skin:     General: Skin is warm and dry.   Neurological:      Mental Status: She is alert and oriented to person, place, and time.   Psychiatric:         Mood and Affect: Mood normal.         Behavior: Behavior normal.         "

## 2024-12-03 NOTE — PATIENT INSTRUCTIONS
Medicare Preventive Visit Patient Instructions  Thank you for completing your Welcome to Medicare Visit or Medicare Annual Wellness Visit today. Your next wellness visit will be due in one year (12/4/2025).  The screening/preventive services that you may require over the next 5-10 years are detailed below. Some tests may not apply to you based off risk factors and/or age. Screening tests ordered at today's visit but not completed yet may show as past due. Also, please note that scanned in results may not display below.  Preventive Screenings:  Service Recommendations Previous Testing/Comments   Colorectal Cancer Screening  * Colonoscopy    * Fecal Occult Blood Test (FOBT)/Fecal Immunochemical Test (FIT)  * Fecal DNA/Cologuard Test  * Flexible Sigmoidoscopy Age: 45-75 years old   Colonoscopy: every 10 years (may be performed more frequently if at higher risk)  OR  FOBT/FIT: every 1 year  OR  Cologuard: every 3 years  OR  Sigmoidoscopy: every 5 years  Screening may be recommended earlier than age 45 if at higher risk for colorectal cancer. Also, an individualized decision between you and your healthcare provider will decide whether screening between the ages of 76-85 would be appropriate. Colonoscopy: 08/29/2024  FOBT/FIT: Not on file  Cologuard: Not on file  Sigmoidoscopy: Not on file          Breast Cancer Screening Age: 40+ years old  Frequency: every 1-2 years  Not required if history of left and right mastectomy Mammogram: 12/12/2023        Cervical Cancer Screening Between the ages of 21-29, pap smear recommended once every 3 years.   Between the ages of 30-65, can perform pap smear with HPV co-testing every 5 years.   Recommendations may differ for women with a history of total hysterectomy, cervical cancer, or abnormal pap smears in past. Pap Smear: Not on file        Hepatitis C Screening Once for adults born between 1945 and 1965  More frequently in patients at high risk for Hepatitis C Hep C Antibody:  07/16/2019        Diabetes Screening 1-2 times per year if you're at risk for diabetes or have pre-diabetes Fasting glucose: 86 mg/dL (7/9/2024)  A1C: 5.4 % (3/22/2023)      Cholesterol Screening Once every 5 years if you don't have a lipid disorder. May order more often based on risk factors. Lipid panel: 07/09/2024          Other Preventive Screenings Covered by Medicare:  Abdominal Aortic Aneurysm (AAA) Screening: covered once if your at risk. You're considered to be at risk if you have a family history of AAA.  Lung Cancer Screening: covers low dose CT scan once per year if you meet all of the following conditions: (1) Age 55-77; (2) No signs or symptoms of lung cancer; (3) Current smoker or have quit smoking within the last 15 years; (4) You have a tobacco smoking history of at least 20 pack years (packs per day multiplied by number of years you smoked); (5) You get a written order from a healthcare provider.  Glaucoma Screening: covered annually if you're considered high risk: (1) You have diabetes OR (2) Family history of glaucoma OR (3)  aged 50 and older OR (4)  American aged 65 and older  Osteoporosis Screening: covered every 2 years if you meet one of the following conditions: (1) You're estrogen deficient and at risk for osteoporosis based off medical history and other findings; (2) Have a vertebral abnormality; (3) On glucocorticoid therapy for more than 3 months; (4) Have primary hyperparathyroidism; (5) On osteoporosis medications and need to assess response to drug therapy.   Last bone density test (DXA Scan): Not on file.  HIV Screening: covered annually if you're between the age of 15-65. Also covered annually if you are younger than 15 and older than 65 with risk factors for HIV infection. For pregnant patients, it is covered up to 3 times per pregnancy.    Immunizations:  Immunization Recommendations   Influenza Vaccine Annual influenza vaccination during flu season is  recommended for all persons aged >= 6 months who do not have contraindications   Pneumococcal Vaccine   * Pneumococcal conjugate vaccine = PCV13 (Prevnar 13), PCV15 (Vaxneuvance), PCV20 (Prevnar 20)  * Pneumococcal polysaccharide vaccine = PPSV23 (Pneumovax) Adults 19-63 yo with certain risk factors or if 65+ yo  If never received any pneumonia vaccine: recommend Prevnar 20 (PCV20)  Give PCV20 if previously received 1 dose of PCV13 or PPSV23   Hepatitis B Vaccine 3 dose series if at intermediate or high risk (ex: diabetes, end stage renal disease, liver disease)   Respiratory syncytial virus (RSV) Vaccine - COVERED BY MEDICARE PART D  * RSVPreF3 (Arexvy) CDC recommends that adults 60 years of age and older may receive a single dose of RSV vaccine using shared clinical decision-making (SCDM)   Tetanus (Td) Vaccine - COST NOT COVERED BY MEDICARE PART B Following completion of primary series, a booster dose should be given every 10 years to maintain immunity against tetanus. Td may also be given as tetanus wound prophylaxis.   Tdap Vaccine - COST NOT COVERED BY MEDICARE PART B Recommended at least once for all adults. For pregnant patients, recommended with each pregnancy.   Shingles Vaccine (Shingrix) - COST NOT COVERED BY MEDICARE PART B  2 shot series recommended in those 19 years and older who have or will have weakened immune systems or those 50 years and older     Health Maintenance Due:      Topic Date Due   • HIV Screening  Never done   • Breast Cancer Screening: Mammogram  12/12/2024   • Colorectal Cancer Screening  08/29/2027   • Hepatitis C Screening  Completed     Immunizations Due:      Topic Date Due   • Influenza Vaccine (1) 09/01/2024   • COVID-19 Vaccine (4 - 2024-25 season) 09/01/2024     Advance Directives   What are advance directives?  Advance directives are legal documents that state your wishes and plans for medical care. These plans are made ahead of time in case you lose your ability to make  decisions for yourself. Advance directives can apply to any medical decision, such as the treatments you want, and if you want to donate organs.   What are the types of advance directives?  There are many types of advance directives, and each state has rules about how to use them. You may choose a combination of any of the following:  Living will:  This is a written record of the treatment you want. You can also choose which treatments you do not want, which to limit, and which to stop at a certain time. This includes surgery, medicine, IV fluid, and tube feedings.   Durable power of  for healthcare (DPAHC):  This is a written record that states who you want to make healthcare choices for you when you are unable to make them for yourself. This person, called a proxy, is usually a family member or a friend. You may choose more than 1 proxy.  Do not resuscitate (DNR) order:  A DNR order is used in case your heart stops beating or you stop breathing. It is a request not to have certain forms of treatment, such as CPR. A DNR order may be included in other types of advance directives.  Medical directive:  This covers the care that you want if you are in a coma, near death, or unable to make decisions for yourself. You can list the treatments you want for each condition. Treatment may include pain medicine, surgery, blood transfusions, dialysis, IV or tube feedings, and a ventilator (breathing machine).  Values history:  This document has questions about your views, beliefs, and how you feel and think about life. This information can help others choose the care that you would choose.  Why are advance directives important?  An advance directive helps you control your care. Although spoken wishes may be used, it is better to have your wishes written down. Spoken wishes can be misunderstood, or not followed. Treatments may be given even if you do not want them. An advance directive may make it easier for your family  to make difficult choices about your care.       © Copyright [employer] 2018 Information is for End User's use only and may not be sold, redistributed or otherwise used for commercial purposes. All illustrations and images included in CareNotes® are the copyrighted property of A.D.A[employer]M., Inc[employer] or [employer]

## 2024-12-03 NOTE — ASSESSMENT & PLAN NOTE
- Reviewed immunizations and screenings.  - UTD with colonoscopy.  - Due for mammogram.  - Flu shot given in office today.  Orders:    CBC and differential; Future    Comprehensive metabolic panel; Future    Lipid Panel with Direct LDL reflex; Future    TSH, 3rd generation with Free T4 reflex; Future

## 2024-12-20 ENCOUNTER — CLINICAL SUPPORT (OUTPATIENT)
Dept: FAMILY MEDICINE CLINIC | Facility: CLINIC | Age: 64
End: 2024-12-20
Payer: MEDICARE

## 2024-12-20 DIAGNOSIS — E53.8 VITAMIN B 12 DEFICIENCY: Primary | ICD-10-CM

## 2024-12-20 PROCEDURE — 96372 THER/PROPH/DIAG INJ SC/IM: CPT

## 2024-12-20 RX ORDER — CYANOCOBALAMIN 1000 UG/ML
1000 INJECTION, SOLUTION INTRAMUSCULAR; SUBCUTANEOUS
Status: SHIPPED | OUTPATIENT
Start: 2024-12-20

## 2024-12-20 RX ADMIN — CYANOCOBALAMIN 1000 MCG: 1000 INJECTION, SOLUTION INTRAMUSCULAR; SUBCUTANEOUS at 13:25

## 2025-01-02 PROBLEM — Z00.00 MEDICARE ANNUAL WELLNESS VISIT, SUBSEQUENT: Status: RESOLVED | Noted: 2024-12-03 | Resolved: 2025-01-02

## 2025-04-15 ENCOUNTER — OFFICE VISIT (OUTPATIENT)
Dept: FAMILY MEDICINE CLINIC | Facility: CLINIC | Age: 65
End: 2025-04-15
Payer: MEDICARE

## 2025-04-15 VITALS
HEIGHT: 62 IN | WEIGHT: 123.8 LBS | SYSTOLIC BLOOD PRESSURE: 110 MMHG | BODY MASS INDEX: 22.78 KG/M2 | TEMPERATURE: 97.2 F | DIASTOLIC BLOOD PRESSURE: 68 MMHG | RESPIRATION RATE: 16 BRPM | HEART RATE: 85 BPM | OXYGEN SATURATION: 100 %

## 2025-04-15 DIAGNOSIS — F31.81 BIPOLAR 2 DISORDER (HCC): ICD-10-CM

## 2025-04-15 DIAGNOSIS — E53.8 VITAMIN B 12 DEFICIENCY: ICD-10-CM

## 2025-04-15 DIAGNOSIS — B35.1 ONYCHOMYCOSIS: Primary | ICD-10-CM

## 2025-04-15 DIAGNOSIS — R10.2 PELVIC PAIN: ICD-10-CM

## 2025-04-15 LAB
BILIRUB UR QL STRIP: NEGATIVE
CLARITY UR: CLEAR
COLOR UR: ABNORMAL
GLUCOSE UR STRIP-MCNC: NEGATIVE MG/DL
HGB UR QL STRIP.AUTO: NEGATIVE
KETONES UR STRIP-MCNC: NEGATIVE MG/DL
LEUKOCYTE ESTERASE UR QL STRIP: ABNORMAL
NITRITE UR QL STRIP: NEGATIVE
PH UR STRIP.AUTO: 7 [PH]
PROT UR STRIP-MCNC: NEGATIVE MG/DL
SL AMB  POCT GLUCOSE, UA: ABNORMAL
SL AMB LEUKOCYTE ESTERASE,UA: POSITIVE
SL AMB POCT BILIRUBIN,UA: ABNORMAL
SL AMB POCT BLOOD,UA: ABNORMAL
SL AMB POCT CLARITY,UA: CLEAR
SL AMB POCT COLOR,UA: YELLOW
SL AMB POCT KETONES,UA: ABNORMAL
SL AMB POCT NITRITE,UA: ABNORMAL
SL AMB POCT PH,UA: 7.5
SL AMB POCT SPECIFIC GRAVITY,UA: 1.01
SL AMB POCT URINE PROTEIN: ABNORMAL
SL AMB POCT UROBILINOGEN: ABNORMAL
SP GR UR STRIP.AUTO: 1.01 (ref 1–1.03)
UROBILINOGEN UR STRIP-ACNC: <2 MG/DL

## 2025-04-15 PROCEDURE — 99214 OFFICE O/P EST MOD 30 MIN: CPT | Performed by: NURSE PRACTITIONER

## 2025-04-15 PROCEDURE — 81001 URINALYSIS AUTO W/SCOPE: CPT | Performed by: NURSE PRACTITIONER

## 2025-04-15 PROCEDURE — 81002 URINALYSIS NONAUTO W/O SCOPE: CPT | Performed by: NURSE PRACTITIONER

## 2025-04-15 PROCEDURE — 96372 THER/PROPH/DIAG INJ SC/IM: CPT

## 2025-04-15 PROCEDURE — 87086 URINE CULTURE/COLONY COUNT: CPT | Performed by: NURSE PRACTITIONER

## 2025-04-15 RX ORDER — CYANOCOBALAMIN 1000 UG/ML
1000 INJECTION, SOLUTION INTRAMUSCULAR; SUBCUTANEOUS
Status: SHIPPED | OUTPATIENT
Start: 2025-04-15

## 2025-04-15 RX ORDER — VILAZODONE HYDROCHLORIDE 20 MG/1
20 TABLET ORAL DAILY
COMMUNITY
Start: 2025-01-21

## 2025-04-15 RX ADMIN — CYANOCOBALAMIN 1000 MCG: 1000 INJECTION, SOLUTION INTRAMUSCULAR; SUBCUTANEOUS at 15:18

## 2025-04-15 NOTE — ASSESSMENT & PLAN NOTE
- B12 injection given in office today.  -We will continue to monitor vitamin B12 level.  Orders:  •  cyanocobalamin injection 1,000 mcg

## 2025-04-15 NOTE — ASSESSMENT & PLAN NOTE
- Urine dip in office positive for leukocytes.  Will send for UA and culture.  Will continue to follow-up regarding results.  Orders:  •  UA w Reflex to Microscopic w Reflex to Culture; Future  •  Urine culture; Future  •  POCT urine dip   Universal Safety Interventions

## 2025-04-15 NOTE — ASSESSMENT & PLAN NOTE
- Stable on current medication regimen.  - Continue routine follow up with Psychiatrist.

## 2025-04-15 NOTE — PROGRESS NOTES
Name: Grace Alcala      : 1960      MRN: [id number]  Encounter Provider: WHITNEY Restrepo  Encounter Date: 4/15/2025   Encounter department: [facility]  :  Assessment & Plan  Onychomycosis  - Discussed benign nature.   - Consider referral to Podiatry if patient wishes.       Pelvic pain  - Urine dip in office positive for leukocytes.  Will send for UA and culture.  Will continue to follow-up regarding results.  Orders:  •  UA w Reflex to Microscopic w Reflex to Culture; Future  •  Urine culture; Future  •  POCT urine dip    Bipolar 2 disorder (HCC)  - Stable on current medication regimen.  - Continue routine follow up with Psychiatrist.              Vitamin B 12 deficiency  - B12 injection given in office today.  -We will continue to monitor vitamin B12 level.  Orders:  •  cyanocobalamin injection 1,000 mcg           History of Present Illness   Patient with PMH of GERD, anemia, HLD, B12 deficiency, and bipolar disorder presents to office today for follow-up.  States that she used to get pedicures and her  told her to follow-up with her doctor regarding discoloration of her toenail.  She denies any signs of infection.  She has complaints today of lower abdominal and pelvic pain that started on .  Also reports urinary frequency.  Her abdominal pain improved after taking omeprazole.  She denies any constipation or diarrhea.  No fever, chills, nausea, or vomiting.  She denies any other concerns or complaints today.          Review of Systems   Constitutional:  Negative for fatigue and fever.   HENT:  Negative for trouble swallowing.    Eyes:  Negative for visual disturbance.   Respiratory:  Negative for cough and shortness of breath.    Cardiovascular:  Negative for chest pain and palpitations.   Gastrointestinal:  Positive for abdominal pain. Negative for blood in stool.   Endocrine: Negative for cold intolerance and heat intolerance.  "  Genitourinary:  Positive for frequency. Negative for difficulty urinating and dysuria.   Musculoskeletal:  Negative for gait problem.   Skin:  Negative for rash.   Neurological:  Negative for dizziness, syncope and headaches.   Hematological:  Negative for adenopathy.   Psychiatric/Behavioral:  Negative for behavioral problems.        Objective   /68 (BP Location: Left arm, Patient Position: Sitting, Cuff Size: Standard)   Pulse 85   Temp (!) 97.2 °F (36.2 °C) (Tympanic)   Resp 16   Ht 5' 2\" (1.575 m)   Wt 56.2 kg (123 lb 12.8 oz)   SpO2 100%   BMI 22.64 kg/m²      Physical Exam  Vitals and nursing note reviewed.   Constitutional:       Appearance: Normal appearance. She is well-developed.   HENT:      Head: Normocephalic and atraumatic.      Right Ear: External ear normal.      Left Ear: External ear normal.   Eyes:      Conjunctiva/sclera: Conjunctivae normal.   Cardiovascular:      Rate and Rhythm: Normal rate and regular rhythm.      Heart sounds: Normal heart sounds.   Pulmonary:      Effort: Pulmonary effort is normal.      Breath sounds: Normal breath sounds.   Abdominal:      General: Bowel sounds are normal.      Palpations: Abdomen is soft.   Musculoskeletal:         General: Normal range of motion.      Cervical back: Normal range of motion.   Skin:     General: Skin is warm and dry.   Neurological:      Mental Status: She is alert and oriented to person, place, and time.   Psychiatric:         Mood and Affect: Mood normal.         Behavior: Behavior normal.         "

## 2025-04-16 DIAGNOSIS — K21.9 GERD WITHOUT ESOPHAGITIS: ICD-10-CM

## 2025-04-16 LAB
BACTERIA UR CULT: NORMAL
BACTERIA UR QL AUTO: ABNORMAL /HPF
CAOX CRY URNS QL MICRO: ABNORMAL /HPF
NON-SQ EPI CELLS URNS QL MICRO: ABNORMAL /HPF
RBC #/AREA URNS AUTO: ABNORMAL /HPF
WBC #/AREA URNS AUTO: ABNORMAL /HPF

## 2025-04-16 RX ORDER — PANTOPRAZOLE SODIUM 40 MG/1
40 TABLET, DELAYED RELEASE ORAL DAILY
Qty: 90 TABLET | Refills: 1 | Status: SHIPPED | OUTPATIENT
Start: 2025-04-16

## 2025-04-17 ENCOUNTER — RESULTS FOLLOW-UP (OUTPATIENT)
Dept: FAMILY MEDICINE CLINIC | Facility: CLINIC | Age: 65
End: 2025-04-17

## 2025-05-04 DIAGNOSIS — K21.9 GERD WITHOUT ESOPHAGITIS: ICD-10-CM

## 2025-05-05 RX ORDER — FAMOTIDINE 40 MG/1
40 TABLET, FILM COATED ORAL
Qty: 90 TABLET | Refills: 1 | Status: SHIPPED | OUTPATIENT
Start: 2025-05-05

## 2025-05-16 ENCOUNTER — CLINICAL SUPPORT (OUTPATIENT)
Dept: FAMILY MEDICINE CLINIC | Facility: CLINIC | Age: 65
End: 2025-05-16
Payer: MEDICARE

## 2025-05-16 DIAGNOSIS — E53.8 VITAMIN B 12 DEFICIENCY: Primary | ICD-10-CM

## 2025-05-16 PROCEDURE — 96372 THER/PROPH/DIAG INJ SC/IM: CPT

## 2025-05-16 PROCEDURE — PBNCHG PB NO CHARGE PLACEHOLDER

## 2025-05-16 RX ORDER — CYANOCOBALAMIN 1000 UG/ML
1000 INJECTION, SOLUTION INTRAMUSCULAR; SUBCUTANEOUS
Status: SHIPPED | OUTPATIENT
Start: 2025-05-16

## 2025-05-16 RX ADMIN — CYANOCOBALAMIN 1000 MCG: 1000 INJECTION, SOLUTION INTRAMUSCULAR; SUBCUTANEOUS at 14:35

## 2025-05-17 DIAGNOSIS — J30.1 SEASONAL ALLERGIC RHINITIS DUE TO POLLEN: ICD-10-CM

## 2025-05-18 RX ORDER — LEVOCETIRIZINE DIHYDROCHLORIDE 5 MG/1
5 TABLET, FILM COATED ORAL EVERY EVENING
Qty: 90 TABLET | Refills: 0 | Status: SHIPPED | OUTPATIENT
Start: 2025-05-18

## 2025-06-03 ENCOUNTER — OFFICE VISIT (OUTPATIENT)
Dept: FAMILY MEDICINE CLINIC | Facility: CLINIC | Age: 65
End: 2025-06-03
Payer: MEDICARE

## 2025-06-03 VITALS
DIASTOLIC BLOOD PRESSURE: 68 MMHG | HEIGHT: 62 IN | TEMPERATURE: 98.2 F | OXYGEN SATURATION: 98 % | BODY MASS INDEX: 24.73 KG/M2 | RESPIRATION RATE: 16 BRPM | SYSTOLIC BLOOD PRESSURE: 110 MMHG | HEART RATE: 100 BPM | WEIGHT: 134.4 LBS

## 2025-06-03 DIAGNOSIS — F31.81 BIPOLAR 2 DISORDER (HCC): ICD-10-CM

## 2025-06-03 DIAGNOSIS — Z78.0 ASYMPTOMATIC MENOPAUSE: ICD-10-CM

## 2025-06-03 DIAGNOSIS — Z12.31 ENCOUNTER FOR SCREENING MAMMOGRAM FOR BREAST CANCER: ICD-10-CM

## 2025-06-03 DIAGNOSIS — E53.8 VITAMIN B 12 DEFICIENCY: ICD-10-CM

## 2025-06-03 DIAGNOSIS — E78.2 MIXED HYPERLIPIDEMIA: Primary | ICD-10-CM

## 2025-06-03 DIAGNOSIS — K21.9 GERD WITHOUT ESOPHAGITIS: ICD-10-CM

## 2025-06-03 PROCEDURE — 99214 OFFICE O/P EST MOD 30 MIN: CPT | Performed by: NURSE PRACTITIONER

## 2025-06-03 PROCEDURE — G2211 COMPLEX E/M VISIT ADD ON: HCPCS | Performed by: NURSE PRACTITIONER

## 2025-06-03 RX ORDER — ESCITALOPRAM OXALATE 10 MG/1
1 TABLET ORAL DAILY
COMMUNITY
Start: 2025-05-14

## 2025-06-03 NOTE — ASSESSMENT & PLAN NOTE
- Stable on Nexium. Continue same.  - Avoid triggering food and beverage.  - Continue routine follow up with GI.        Muscle pain probably is related to the statin we can try cutting dose to 20 mg a day or changing to a different me, we could try Crestor (rosuvastatin ) 5 mg a day.  Either on is ok.

## 2025-06-03 NOTE — PROGRESS NOTES
Name: Grace Alcala      : 1960      MRN: [id number]  Encounter Provider: WHITNEY Restrepo  Encounter Date: 6/3/2025   Encounter department: [facility]  :  Assessment & Plan  Mixed hyperlipidemia  Component      Latest Ref Rng 11/15/2022 2024   Cholesterol      See Comment mg/dL 155  163    Triglycerides      See Comment mg/dL 68  60    HDL      >=50 mg/dL 67  80    LDL Calculated      0 - 100 mg/dL 74  71      - Well controlled.  - Encourage healthy diet and regular exercise.  - Will continue to monitor fasting lipid panel.        Bipolar 2 disorder (HCC)  - Stable on current medication regimen.  - Continue routine follow up with Psychiatrist.              GERD without esophagitis  - Stable on Nexium. Continue same.  - Avoid triggering food and beverage.  - Continue routine follow up with GI.         Vitamin B 12 deficiency  - Receives B12 injections.   - Will continue to monitor vitamin B12 level.       Encounter for screening mammogram for breast cancer    Orders:  •  Mammo screening bilateral w 3d and cad; Future    Asymptomatic menopause    Orders:  •  DXA bone density spine hip and pelvis; Future           History of Present Illness   Patient with PMH of GERD, anemia, HLD, B12 deficiency, and bipolar disorder presents to office today for follow-up.  She is taking her prescribed medications. She continues to follow with her Psychiatrist. She is due for updated blood work. She denies any significant concerns or complaints today.      Review of Systems   Constitutional:  Negative for fatigue and fever.   HENT:  Negative for trouble swallowing.    Eyes:  Negative for visual disturbance.   Respiratory:  Negative for cough and shortness of breath.    Cardiovascular:  Negative for chest pain and palpitations.   Gastrointestinal:  Negative for abdominal pain and blood in stool.   Endocrine: Negative for cold intolerance and heat intolerance.   Genitourinary:   "Negative for difficulty urinating and dysuria.   Musculoskeletal:  Negative for gait problem.   Skin:  Negative for rash.   Neurological:  Negative for dizziness, syncope and headaches.   Hematological:  Negative for adenopathy.   Psychiatric/Behavioral:  Negative for behavioral problems.        Objective   /68 (BP Location: Left arm, Patient Position: Sitting, Cuff Size: Standard)   Pulse 100   Temp 98.2 °F (36.8 °C) (Tympanic)   Resp 16   Ht 5' 2\" (1.575 m)   Wt 61 kg (134 lb 6.4 oz)   SpO2 98%   BMI 24.58 kg/m²      Physical Exam  Vitals and nursing note reviewed.   Constitutional:       Appearance: Normal appearance. She is well-developed.   HENT:      Head: Normocephalic and atraumatic.      Right Ear: External ear normal.      Left Ear: External ear normal.     Eyes:      Conjunctiva/sclera: Conjunctivae normal.       Cardiovascular:      Rate and Rhythm: Normal rate and regular rhythm.      Heart sounds: Normal heart sounds.   Pulmonary:      Effort: Pulmonary effort is normal.      Breath sounds: Normal breath sounds.     Musculoskeletal:         General: Normal range of motion.      Cervical back: Normal range of motion.     Skin:     General: Skin is warm and dry.     Neurological:      Mental Status: She is alert and oriented to person, place, and time.     Psychiatric:         Mood and Affect: Mood normal.         Behavior: Behavior normal.         "

## 2025-06-03 NOTE — ASSESSMENT & PLAN NOTE
Component      Latest Ref Rng 11/15/2022 7/9/2024   Cholesterol      See Comment mg/dL 155  163    Triglycerides      See Comment mg/dL 68  60    HDL      >=50 mg/dL 67  80    LDL Calculated      0 - 100 mg/dL 74  71      - Well controlled.  - Encourage healthy diet and regular exercise.  - Will continue to monitor fasting lipid panel.

## 2025-06-18 ENCOUNTER — CLINICAL SUPPORT (OUTPATIENT)
Dept: FAMILY MEDICINE CLINIC | Facility: CLINIC | Age: 65
End: 2025-06-18
Payer: MEDICARE

## 2025-06-18 DIAGNOSIS — E53.8 VITAMIN B 12 DEFICIENCY: Primary | ICD-10-CM

## 2025-06-18 PROCEDURE — PBNCHG PB NO CHARGE PLACEHOLDER

## 2025-06-18 PROCEDURE — 96372 THER/PROPH/DIAG INJ SC/IM: CPT

## 2025-06-18 RX ORDER — CYANOCOBALAMIN 1000 UG/ML
1000 INJECTION, SOLUTION INTRAMUSCULAR; SUBCUTANEOUS
Status: SHIPPED | OUTPATIENT
Start: 2025-06-18

## 2025-06-18 RX ORDER — CYANOCOBALAMIN 1000 UG/ML
1000 INJECTION, SOLUTION INTRAMUSCULAR; SUBCUTANEOUS
Status: CANCELLED | OUTPATIENT
Start: 2025-06-18

## 2025-06-18 RX ADMIN — CYANOCOBALAMIN 1000 MCG: 1000 INJECTION, SOLUTION INTRAMUSCULAR; SUBCUTANEOUS at 14:23

## 2025-07-10 ENCOUNTER — HOSPITAL ENCOUNTER (OUTPATIENT)
Dept: RADIOLOGY | Facility: IMAGING CENTER | Age: 65
End: 2025-07-10
Attending: NURSE PRACTITIONER
Payer: MEDICARE

## 2025-07-10 VITALS — HEIGHT: 62 IN | WEIGHT: 133 LBS | BODY MASS INDEX: 24.48 KG/M2

## 2025-07-10 DIAGNOSIS — Z12.31 ENCOUNTER FOR SCREENING MAMMOGRAM FOR BREAST CANCER: ICD-10-CM

## 2025-07-10 DIAGNOSIS — Z78.0 ASYMPTOMATIC MENOPAUSE: ICD-10-CM

## 2025-07-10 PROCEDURE — 77063 BREAST TOMOSYNTHESIS BI: CPT

## 2025-07-10 PROCEDURE — 77067 SCR MAMMO BI INCL CAD: CPT

## 2025-07-10 PROCEDURE — 77080 DXA BONE DENSITY AXIAL: CPT

## 2025-07-15 ENCOUNTER — HOSPITAL ENCOUNTER (OUTPATIENT)
Dept: MAMMOGRAPHY | Facility: CLINIC | Age: 65
Discharge: HOME | End: 2025-07-15
Attending: NURSE PRACTITIONER
Payer: MEDICARE

## 2025-07-15 VITALS — HEIGHT: 62 IN | WEIGHT: 133 LBS | BODY MASS INDEX: 24.48 KG/M2

## 2025-07-15 DIAGNOSIS — R92.8 ABNORMAL MAMMOGRAM: ICD-10-CM

## 2025-07-15 PROCEDURE — 77065 DX MAMMO INCL CAD UNI: CPT

## 2025-07-21 ENCOUNTER — CLINICAL SUPPORT (OUTPATIENT)
Dept: FAMILY MEDICINE CLINIC | Facility: CLINIC | Age: 65
End: 2025-07-21
Payer: MEDICARE

## 2025-07-21 DIAGNOSIS — E53.8 VITAMIN B 12 DEFICIENCY: Primary | ICD-10-CM

## 2025-07-21 PROCEDURE — 96372 THER/PROPH/DIAG INJ SC/IM: CPT

## 2025-07-21 PROCEDURE — PBNCHG PB NO CHARGE PLACEHOLDER

## 2025-07-21 RX ORDER — CYANOCOBALAMIN 1000 UG/ML
1000 INJECTION, SOLUTION INTRAMUSCULAR; SUBCUTANEOUS
Status: SHIPPED | OUTPATIENT
Start: 2025-07-21

## 2025-07-21 RX ADMIN — CYANOCOBALAMIN 1000 MCG: 1000 INJECTION, SOLUTION INTRAMUSCULAR; SUBCUTANEOUS at 14:49

## 2025-08-21 ENCOUNTER — CLINICAL SUPPORT (OUTPATIENT)
Dept: FAMILY MEDICINE CLINIC | Facility: CLINIC | Age: 65
End: 2025-08-21
Payer: MEDICARE

## 2025-08-21 DIAGNOSIS — E53.8 VITAMIN B 12 DEFICIENCY: Primary | ICD-10-CM

## 2025-08-21 PROCEDURE — 96372 THER/PROPH/DIAG INJ SC/IM: CPT

## 2025-08-21 RX ORDER — CYANOCOBALAMIN 1000 UG/ML
1000 INJECTION, SOLUTION INTRAMUSCULAR; SUBCUTANEOUS
Status: SHIPPED | OUTPATIENT
Start: 2025-08-21

## 2025-08-21 RX ADMIN — CYANOCOBALAMIN 1000 MCG: 1000 INJECTION, SOLUTION INTRAMUSCULAR; SUBCUTANEOUS at 14:16
